# Patient Record
Sex: MALE | Race: WHITE | Employment: FULL TIME | URBAN - METROPOLITAN AREA
[De-identification: names, ages, dates, MRNs, and addresses within clinical notes are randomized per-mention and may not be internally consistent; named-entity substitution may affect disease eponyms.]

---

## 2017-01-12 ENCOUNTER — HOSPITAL ENCOUNTER (INPATIENT)
Age: 47
LOS: 5 days | Discharge: HOME | DRG: 387 | End: 2017-01-17
Attending: EMERGENCY MEDICINE | Admitting: INTERNAL MEDICINE
Payer: COMMERCIAL

## 2017-01-12 ENCOUNTER — APPOINTMENT (OUTPATIENT)
Dept: CT IMAGING | Age: 47
DRG: 387 | End: 2017-01-12
Attending: EMERGENCY MEDICINE
Payer: COMMERCIAL

## 2017-01-12 DIAGNOSIS — K51.911 ULCERATIVE COLITIS WITH RECTAL BLEEDING, UNSPECIFIED LOCATION (HCC): Primary | ICD-10-CM

## 2017-01-12 PROBLEM — K62.5 RECTAL BLEEDING: Status: ACTIVE | Noted: 2017-01-12

## 2017-01-12 PROBLEM — K51.90 COLITIS, ULCERATIVE CHRONIC (HCC): Status: ACTIVE | Noted: 2017-01-12

## 2017-01-12 PROBLEM — K51.90 ULCERATIVE COLITIS, CHRONIC (HCC): Status: ACTIVE | Noted: 2017-01-12

## 2017-01-12 PROBLEM — R50.9 FEVER: Status: ACTIVE | Noted: 2017-01-12

## 2017-01-12 LAB
ALBUMIN SERPL BCP-MCNC: 2.9 G/DL (ref 3.5–5)
ALBUMIN/GLOB SERPL: 0.6 {RATIO} (ref 1.2–3.5)
ALP SERPL-CCNC: 81 U/L (ref 50–136)
ALT SERPL-CCNC: 22 U/L (ref 12–65)
ANION GAP BLD CALC-SCNC: 11 MMOL/L (ref 7–16)
AST SERPL W P-5'-P-CCNC: 79 U/L (ref 15–37)
BILIRUB SERPL-MCNC: 0.4 MG/DL (ref 0.2–1.1)
BUN SERPL-MCNC: 10 MG/DL (ref 6–23)
CALCIUM SERPL-MCNC: 8.6 MG/DL (ref 8.3–10.4)
CHLORIDE SERPL-SCNC: 101 MMOL/L (ref 98–107)
CK SERPL-CCNC: 16 U/L (ref 21–215)
CO2 SERPL-SCNC: 23 MMOL/L (ref 21–32)
CREAT SERPL-MCNC: 1.06 MG/DL (ref 0.8–1.5)
DIFFERENTIAL METHOD BLD: ABNORMAL
ERYTHROCYTE [DISTWIDTH] IN BLOOD BY AUTOMATED COUNT: 19 % (ref 11.9–14.6)
FLUAV AG NPH QL IA: NEGATIVE
FLUBV AG NPH QL IA: NEGATIVE
GLOBULIN SER CALC-MCNC: 5 G/DL (ref 2.3–3.5)
GLUCOSE SERPL-MCNC: 99 MG/DL (ref 65–100)
HCT VFR BLD AUTO: 37 % (ref 41.1–50.3)
HGB BLD-MCNC: 11.2 G/DL (ref 13.6–17.2)
LACTATE BLD-SCNC: 1.3 MMOL/L (ref 0.5–1.9)
LIPASE SERPL-CCNC: 60 U/L (ref 73–393)
LYMPHOCYTES # BLD: 2.5 K/UL (ref 0.5–4.6)
LYMPHOCYTES NFR BLD MANUAL: 14 % (ref 16–44)
MCH RBC QN AUTO: 25.6 PG (ref 26.1–32.9)
MCHC RBC AUTO-ENTMCNC: 30.3 G/DL (ref 31.4–35)
MCV RBC AUTO: 84.5 FL (ref 79.6–97.8)
MONOCYTES # BLD: 1.1 K/UL (ref 0.1–1.3)
MONOCYTES NFR BLD MANUAL: 6 % (ref 3–9)
NEUTS BAND NFR BLD MANUAL: 8 % (ref 0–10)
NEUTS SEG # BLD: 14.3 K/UL (ref 1.7–8.2)
NEUTS SEG NFR BLD MANUAL: 72 % (ref 47–75)
PLATELET # BLD AUTO: 386 K/UL (ref 150–450)
PLATELET COMMENTS,PCOM: ADEQUATE
PMV BLD AUTO: 9.5 FL (ref 10.8–14.1)
POTASSIUM SERPL-SCNC: 5 MMOL/L (ref 3.5–5.1)
PROCALCITONIN SERPL-MCNC: 0.8 NG/ML
PROT SERPL-MCNC: 7.9 G/DL (ref 6.3–8.2)
RBC # BLD AUTO: 4.38 M/UL (ref 4.23–5.67)
RBC MORPH BLD: ABNORMAL
SODIUM SERPL-SCNC: 135 MMOL/L (ref 136–145)
WBC # BLD AUTO: 17.9 K/UL (ref 4.3–11.1)
WBC MORPH BLD: ABNORMAL

## 2017-01-12 PROCEDURE — 84145 PROCALCITONIN (PCT): CPT | Performed by: EMERGENCY MEDICINE

## 2017-01-12 PROCEDURE — 83605 ASSAY OF LACTIC ACID: CPT

## 2017-01-12 PROCEDURE — 83690 ASSAY OF LIPASE: CPT | Performed by: EMERGENCY MEDICINE

## 2017-01-12 PROCEDURE — 96375 TX/PRO/DX INJ NEW DRUG ADDON: CPT | Performed by: EMERGENCY MEDICINE

## 2017-01-12 PROCEDURE — 87804 INFLUENZA ASSAY W/OPTIC: CPT | Performed by: EMERGENCY MEDICINE

## 2017-01-12 PROCEDURE — 65270000029 HC RM PRIVATE

## 2017-01-12 PROCEDURE — 74011250637 HC RX REV CODE- 250/637: Performed by: INTERNAL MEDICINE

## 2017-01-12 PROCEDURE — 74011000258 HC RX REV CODE- 258: Performed by: EMERGENCY MEDICINE

## 2017-01-12 PROCEDURE — 85025 COMPLETE CBC W/AUTO DIFF WBC: CPT | Performed by: EMERGENCY MEDICINE

## 2017-01-12 PROCEDURE — 87493 C DIFF AMPLIFIED PROBE: CPT | Performed by: INTERNAL MEDICINE

## 2017-01-12 PROCEDURE — 74011636320 HC RX REV CODE- 636/320: Performed by: EMERGENCY MEDICINE

## 2017-01-12 PROCEDURE — 74011250636 HC RX REV CODE- 250/636: Performed by: INTERNAL MEDICINE

## 2017-01-12 PROCEDURE — 96361 HYDRATE IV INFUSION ADD-ON: CPT | Performed by: EMERGENCY MEDICINE

## 2017-01-12 PROCEDURE — 74011000250 HC RX REV CODE- 250: Performed by: INTERNAL MEDICINE

## 2017-01-12 PROCEDURE — 74011250636 HC RX REV CODE- 250/636: Performed by: EMERGENCY MEDICINE

## 2017-01-12 PROCEDURE — 96374 THER/PROPH/DIAG INJ IV PUSH: CPT | Performed by: EMERGENCY MEDICINE

## 2017-01-12 PROCEDURE — 80053 COMPREHEN METABOLIC PANEL: CPT | Performed by: EMERGENCY MEDICINE

## 2017-01-12 PROCEDURE — 99285 EMERGENCY DEPT VISIT HI MDM: CPT | Performed by: EMERGENCY MEDICINE

## 2017-01-12 PROCEDURE — 89055 LEUKOCYTE ASSESSMENT FECAL: CPT | Performed by: INTERNAL MEDICINE

## 2017-01-12 PROCEDURE — 74177 CT ABD & PELVIS W/CONTRAST: CPT

## 2017-01-12 PROCEDURE — 81003 URINALYSIS AUTO W/O SCOPE: CPT | Performed by: EMERGENCY MEDICINE

## 2017-01-12 PROCEDURE — 82550 ASSAY OF CK (CPK): CPT | Performed by: EMERGENCY MEDICINE

## 2017-01-12 RX ORDER — MESALAMINE 1.2 G/1
2.4 TABLET, DELAYED RELEASE ORAL
COMMUNITY

## 2017-01-12 RX ORDER — MESALAMINE 1.2 G/1
2400 TABLET, DELAYED RELEASE ORAL 2 TIMES DAILY WITH MEALS
Status: DISCONTINUED | OUTPATIENT
Start: 2017-01-13 | End: 2017-01-17 | Stop reason: HOSPADM

## 2017-01-12 RX ORDER — SODIUM CHLORIDE 0.9 % (FLUSH) 0.9 %
5-10 SYRINGE (ML) INJECTION EVERY 8 HOURS
Status: DISCONTINUED | OUTPATIENT
Start: 2017-01-12 | End: 2017-01-17 | Stop reason: HOSPADM

## 2017-01-12 RX ORDER — ASPIRIN 81 MG/1
81 TABLET ORAL DAILY
Status: ON HOLD | COMMUNITY
End: 2017-01-18 | Stop reason: CLARIF

## 2017-01-12 RX ORDER — HYDROMORPHONE HYDROCHLORIDE 1 MG/ML
1 INJECTION, SOLUTION INTRAMUSCULAR; INTRAVENOUS; SUBCUTANEOUS
Status: COMPLETED | OUTPATIENT
Start: 2017-01-12 | End: 2017-01-12

## 2017-01-12 RX ORDER — METRONIDAZOLE 500 MG/100ML
500 INJECTION, SOLUTION INTRAVENOUS EVERY 8 HOURS
Status: DISCONTINUED | OUTPATIENT
Start: 2017-01-12 | End: 2017-01-13

## 2017-01-12 RX ORDER — LANOLIN ALCOHOL/MO/W.PET/CERES
CREAM (GRAM) TOPICAL DAILY
Status: ON HOLD | COMMUNITY
End: 2017-01-18 | Stop reason: CLARIF

## 2017-01-12 RX ORDER — NALOXONE HYDROCHLORIDE 0.4 MG/ML
0.4 INJECTION, SOLUTION INTRAMUSCULAR; INTRAVENOUS; SUBCUTANEOUS AS NEEDED
Status: DISCONTINUED | OUTPATIENT
Start: 2017-01-12 | End: 2017-01-17 | Stop reason: HOSPADM

## 2017-01-12 RX ORDER — PREDNISONE 20 MG/1
40 TABLET ORAL
Status: ON HOLD | COMMUNITY
End: 2017-01-18 | Stop reason: CLARIF

## 2017-01-12 RX ORDER — ONDANSETRON 2 MG/ML
4 INJECTION INTRAMUSCULAR; INTRAVENOUS
Status: COMPLETED | OUTPATIENT
Start: 2017-01-12 | End: 2017-01-12

## 2017-01-12 RX ORDER — SODIUM CHLORIDE 0.9 % (FLUSH) 0.9 %
5-10 SYRINGE (ML) INJECTION AS NEEDED
Status: DISCONTINUED | OUTPATIENT
Start: 2017-01-12 | End: 2017-01-17 | Stop reason: HOSPADM

## 2017-01-12 RX ORDER — HYDROCODONE BITARTRATE AND ACETAMINOPHEN 5; 325 MG/1; MG/1
1 TABLET ORAL
COMMUNITY

## 2017-01-12 RX ORDER — ONDANSETRON 2 MG/ML
4 INJECTION INTRAMUSCULAR; INTRAVENOUS
Status: DISCONTINUED | OUTPATIENT
Start: 2017-01-12 | End: 2017-01-17 | Stop reason: HOSPADM

## 2017-01-12 RX ORDER — SODIUM CHLORIDE 0.9 % (FLUSH) 0.9 %
10 SYRINGE (ML) INJECTION
Status: COMPLETED | OUTPATIENT
Start: 2017-01-12 | End: 2017-01-12

## 2017-01-12 RX ORDER — HYDROMORPHONE HYDROCHLORIDE 1 MG/ML
1 INJECTION, SOLUTION INTRAMUSCULAR; INTRAVENOUS; SUBCUTANEOUS
Status: DISCONTINUED | OUTPATIENT
Start: 2017-01-12 | End: 2017-01-17 | Stop reason: HOSPADM

## 2017-01-12 RX ORDER — SODIUM CHLORIDE 9 MG/ML
50 INJECTION, SOLUTION INTRAVENOUS CONTINUOUS
Status: DISCONTINUED | OUTPATIENT
Start: 2017-01-12 | End: 2017-01-17 | Stop reason: HOSPADM

## 2017-01-12 RX ORDER — PROMETHAZINE HYDROCHLORIDE 25 MG/1
25 TABLET ORAL
Status: DISCONTINUED | OUTPATIENT
Start: 2017-01-12 | End: 2017-01-17 | Stop reason: HOSPADM

## 2017-01-12 RX ORDER — HYDROCODONE BITARTRATE AND ACETAMINOPHEN 7.5; 325 MG/1; MG/1
1 TABLET ORAL
Status: DISCONTINUED | OUTPATIENT
Start: 2017-01-12 | End: 2017-01-17 | Stop reason: HOSPADM

## 2017-01-12 RX ORDER — DICYCLOMINE HYDROCHLORIDE 20 MG/1
20 TABLET ORAL
Status: DISCONTINUED | OUTPATIENT
Start: 2017-01-12 | End: 2017-01-17 | Stop reason: HOSPADM

## 2017-01-12 RX ORDER — ZOLPIDEM TARTRATE 5 MG/1
5 TABLET ORAL
Status: DISCONTINUED | OUTPATIENT
Start: 2017-01-12 | End: 2017-01-17 | Stop reason: HOSPADM

## 2017-01-12 RX ORDER — FOLIC ACID 1 MG/1
1 TABLET ORAL DAILY
COMMUNITY

## 2017-01-12 RX ORDER — ACETAMINOPHEN 325 MG/1
650 TABLET ORAL
Status: DISCONTINUED | OUTPATIENT
Start: 2017-01-12 | End: 2017-01-17 | Stop reason: HOSPADM

## 2017-01-12 RX ADMIN — ACETAMINOPHEN 650 MG: 325 TABLET, FILM COATED ORAL at 23:40

## 2017-01-12 RX ADMIN — METRONIDAZOLE 500 MG: 500 INJECTION, SOLUTION INTRAVENOUS at 22:00

## 2017-01-12 RX ADMIN — Medication 10 ML: at 16:49

## 2017-01-12 RX ADMIN — METHYLPREDNISOLONE SODIUM SUCCINATE 40 MG: 40 INJECTION, POWDER, FOR SOLUTION INTRAMUSCULAR; INTRAVENOUS at 22:00

## 2017-01-12 RX ADMIN — SODIUM CHLORIDE 100 ML: 900 INJECTION, SOLUTION INTRAVENOUS at 16:50

## 2017-01-12 RX ADMIN — HYDROMORPHONE HYDROCHLORIDE 1 MG: 1 INJECTION, SOLUTION INTRAMUSCULAR; INTRAVENOUS; SUBCUTANEOUS at 15:08

## 2017-01-12 RX ADMIN — SODIUM CHLORIDE 1000 ML: 900 INJECTION, SOLUTION INTRAVENOUS at 15:08

## 2017-01-12 RX ADMIN — IOPAMIDOL 100 ML: 755 INJECTION, SOLUTION INTRAVENOUS at 16:50

## 2017-01-12 RX ADMIN — ONDANSETRON 4 MG: 2 INJECTION, SOLUTION INTRAMUSCULAR; INTRAVENOUS at 15:08

## 2017-01-12 RX ADMIN — Medication 10 ML: at 22:02

## 2017-01-12 RX ADMIN — SODIUM CHLORIDE 100 ML/HR: 900 INJECTION, SOLUTION INTRAVENOUS at 19:46

## 2017-01-12 RX ADMIN — HYDROCODONE BITARTRATE AND ACETAMINOPHEN 1 TABLET: 7.5; 325 TABLET ORAL at 22:01

## 2017-01-12 NOTE — LETTER
[address] [facility] EMERGENCY DEPT One [address] [address] [phone] Work/School Note Date: 1/12/2017 To Whom It May concern: Jazmín Villa was seen and treated today in the emergency room by the following provider(s): 
Attending Provider: Nahed Wang MD. Jazmín Villa was seen in the ER today 1/12/17 and his wife, Vinny Green. Avani Ovalle was seen in the ER with him today and may return to work on 1/13/17. Sincerely, Geovany Gillis RN

## 2017-01-12 NOTE — PROGRESS NOTES
TRANSFER - IN REPORT:    Verbal report received from [facility] LES on [employer]  being received from ER for routine progression of care      Report consisted of patients Situation, Background, Assessment and   Recommendations(SBAR). Information from the following report(s) SBAR and ED Summary was reviewed with the receiving nurse. Opportunity for questions and clarification was provided. Assessment completed upon patients arrival to unit and care assumed.

## 2017-01-12 NOTE — IP AVS SNAPSHOT
Current Discharge Medication List  
  
Take these medications at their scheduled times Dose & Instructions Dispensing Information Comments Morning Noon Evening Bedtime  
 aspirin delayed-release 81 mg tablet Dose:  81 mg Take 81 mg by mouth daily. Refills:  0  
     
   
   
   
  
 folic acid 1 mg tablet Commonly known as:  [employer] Your next dose is:  Tomorrow Dose:  1 mg Take 1 mg by mouth daily. Refills:  0  
     
   
   
   
  
 hydrocortisone 25 mg Supp Commonly known as:  ANUSOL-HC Your next dose is: Today Dose:  25 mg Insert 1 Suppository into rectum every twelve (12) hours. Quantity:  14 Suppository Refills:  0  
     
   
   
 9 PM  
  
 Iron 325 mg (65 mg iron) tablet Generic drug:  ferrous sulfate Take  by mouth daily. Refills:  0 LIALDA 1.2 gram delayed release tablet Generic drug:  mesalamine Your next dose is: Today Dose:  2.4 g Take 2.4 g by mouth ACB/HS. Refills:  0  
     
   
 5 PM  
   
  
 metroNIDAZOLE 500 mg tablet Commonly known as:  FLAGYL Your next dose is: Today Dose:  500 mg Take 1 Tab by mouth every eight (8) hours for 9 days. Indications: Clostridium difficile infection Quantity:  27 Tab Refills:  0  
     
   
   
 10 PM  
  
 * predniSONE 20 mg tablet Commonly known as:  Juan Dowd Dose:  40 mg Take 40 mg by mouth daily (with breakfast). Refills:  0  
     
   
   
   
  
 * Notice: This list has 1 medication(s) that are the same as other medications prescribed for you. Read the directions carefully, and ask your doctor or other care provider to review them with you. Take these medications as needed Dose & Instructions Dispensing Information Comments Morning Noon Evening Bedtime NORCO 5-325 mg per tablet Generic drug:  HYDROcodone-acetaminophen Dose:  1 Tab Take 1 Tab by mouth every six (6) hours as needed for Pain. Refills:  0  
     
   
   
   
  
 promethazine 25 mg tablet Commonly known as:  PHENERGAN Dose:  25 mg Take 1 Tab by mouth every eight (8) hours as needed. Quantity:  15 Tab Refills:  0 Take these medications as directed Dose & Instructions Dispensing Information Comments Morning Noon Evening Bedtime * predniSONE 10 mg tablet Commonly known as:  Nanine Knife Your next dose is:  Tomorrow Take 4 tabs PO daily for 7 days, then 3 tabs PO for 7 days, then 2 tabs PO for 7 days, then 1 tab PO for 7 days. Then stop. Indications: ULCERATIVE COLITIS Quantity:  70 Tab Refills:  0 REMICADE IV  
   
 by IntraVENous route. Refills:  0  
     
   
   
   
  
 * Notice: This list has 1 medication(s) that are the same as other medications prescribed for you. Read the directions carefully, and ask your doctor or other care provider to review them with you. Where to Get Your Medications Information about where to get these medications is not yet available ! Ask your nurse or doctor about these medications  
  hydrocortisone 25 mg Supp  
 metroNIDAZOLE 500 mg tablet  
 predniSONE 10 mg tablet

## 2017-01-12 NOTE — ED TRIAGE NOTES
Reports fever for a couple of days. Reports fever was 104 at home this AM.  States not eating well. Took tylenol this AM for fever.

## 2017-01-12 NOTE — ED PROVIDER NOTES
HPI Comments: Presents with complaints of fever the past 2 nights. Reports increased abdominal pain and diarrhea/hematochezia. He normally has 2-3 bowel movements per day now having 10. Patient is due for his third Remicade shot in 1 week. He is currently on 40 mg of prednisone. Patient is a 40 y.o. male presenting with fever. The history is provided by the patient. Fever    This is a new problem. The current episode started 2 days ago. The problem occurs constantly. The problem has been gradually worsening. The maximum temperature noted was 103 - 104 F. Associated symptoms include diarrhea. Pertinent negatives include no vomiting, no cough, no shortness of breath and no rash. He has tried acetaminophen and prescription drug for the symptoms. Past Medical History:   Diagnosis Date    Ulcerative colitis [facility])        Past Surgical History:   Procedure Laterality Date    Flexible sigmoidoscopy N/A 12/5/2016     SIGMOIDOSCOPY FLEXIBLE performed by Ghazala Mosher MD at [facility] ENDOSCOPY         History reviewed. No pertinent family history. Social History     Social History    Marital status:      Spouse name: N/A    Number of children: N/A    Years of education: N/A     Occupational History    Not on file. Social History Main Topics    Smoking status: Never Smoker    Smokeless tobacco: Not on file    Alcohol use Yes    Drug use: No    Sexual activity: Not on file     Other Topics Concern    Not on file     Social History Narrative         ALLERGIES: Ibuprofen and Sulfa (sulfonamide antibiotics)    Review of Systems   Constitutional: Positive for chills and fever. Respiratory: Negative for cough and shortness of breath. Gastrointestinal: Positive for diarrhea. Negative for vomiting. Skin: Negative for rash and wound. All other systems reviewed and are negative.       Vitals:    01/12/17 1037 01/12/17 1239   BP: 114/72    Pulse: 97    Resp: 18    Temp: 99.5 °F (37.5 °C) 99 °F (37.2 °C)   SpO2: 97%    Weight: 95 kg (209 lb 7 oz)    Height: 5' 9\" (1.753 m)             Physical Exam   Constitutional: He is oriented to person, place, and time. He appears well-developed and well-nourished. No distress. HENT:   Head: Normocephalic and atraumatic. Neck: Normal range of motion. Neck supple. Cardiovascular: Normal rate. Pulmonary/Chest: Effort normal and breath sounds normal. No respiratory distress. He has no wheezes. He has no rales. Abdominal: Soft. He exhibits no distension. There is tenderness (minor suprapubic). There is no rebound and no guarding. Musculoskeletal: Normal range of motion. He exhibits no edema. Neurological: He is alert and oriented to person, place, and time. No cranial nerve deficit. Skin: Skin is warm and dry. He is not diaphoretic. Psychiatric: He has a normal mood and affect. His behavior is normal.   Nursing note and vitals reviewed. MDM  Number of Diagnoses or Management Options  Ulcerative colitis with rectal bleeding, unspecified location [facility]):   Diagnosis management comments: Patient with increased abdominal pain, fever, already on prednisone and Remicade discussed with Dr. Rachell Olivo and he will need to be admitted.        Amount and/or Complexity of Data Reviewed  Clinical lab tests: ordered and reviewed  Review and summarize past medical records: yes (Recently admitted for the same thing had a CT scan that showed no abscess)  Discuss the patient with other providers: yes    Risk of Complications, Morbidity, and/or Mortality  Presenting problems: high  Diagnostic procedures: moderate  Management options: high    Patient Progress  Patient progress: improved    ED Course       Procedures

## 2017-01-12 NOTE — H&P
[facility] Consult Note  [facility] Admission H&P       Primary GI Physician: Alfredito Acevedo MD    Referring Physician:  Dr Henrietta Ding - ER    Consult Date:  1/12/2017    Admit Date:  1/12/2017    Chief Complaint:  UC patient known to you with fever    Subjective:     History of Present Illness:  Patient is a 40 y.o. male with PMH of Chronic Ulcerative Colitis > 20 years well known to me, who is seen in consultation at the request of Dr. Henrietta Ding for disposition / admission for fever and flare-up of Colitis. He was last seen in our office by Dr. Chucho Howard on 1/5/17 after hospitalization at [address] [place] [address] from 12/1/16 to 12/13/16 for UC flare-up at which time he was started on Remicade therapy with first dose administered on 12/9 with improvement in his symptoms and was discharged on tapering Prednisone. He received the second dose of Remicade on 12/22 and is scheduled for the 3rd infusion on January 19. Patient reports initial improvement in his diarrhea and other symptoms with resolution of hematochezia, abdominal pain and describes having 1 to 2 formed stools per day. However his symptoms relapsed over the last week with increasing abdominal pain, diarrhea, hematochezia and low-grade fevers. He also describes decreased appetite and nausea with some weight loss. He had contacted our office and was advised to increase prednisone to 40 mg per day which he has taken for the last 3 days. He woke up this morning with fever of 104. He took some Tylenol and is currently afebrile. Evaluation in the emergency room has included chest x-ray and flu swab which have been negative. A CT scan of the abdomen and pelvis shows inflammation of the distal colon consistent with ulcerative colitis. White count was elevated to 17.9 with 70 segs and 8 bands. Hemoglobin is 11.2. Electrolytes were normal, BUN was 10, creatinine was 1.06. Liver profile revealed an elevated AST of 79 and a low albumin of 2.9.  Other liver enzymes were normal. Lipase was low at 60. Procalcitonin was elevated at 0.8. Lactic acid was 1.3. Patient localizes his abdominal pain to the suprapubic and both lower quadrants, left greater than right. He denies any vomiting, hematemesis or coffee-ground emesis. He reports mild reflux symptoms without dysphagia or odynophagia. PMH:  Past Medical History   Diagnosis Date    Ulcerative colitis ([address]      Prior treatment with 5-ASA, Steroids, Budesonide. GERD controlled with OTC Omeprazole       PSH:  Past Surgical History   Procedure Laterality Date    Flexible sigmoidoscopy N/A 12/5/2016     SIGMOIDOSCOPY FLEXIBLE performed by Davis Blanchard MD at [facility] ENDOSCOPY     Colon bx showed Chronic active Colitis. Bx were negative for CMV. EGD - Gastritis negative for h pylori  Candida esophagitis - treated with diflucan. Additional prior Colonoscopies with predominantly left sided disease. Allergies: Allergies   Allergen Reactions    Ibuprofen Other (comments)     Colitis      Sulfa (Sulfonamide Antibiotics) Rash       Home Medications:  Prior to Admission medications    Medication Sig Start Date End Date Taking? Authorizing Provider   dicyclomine (BENTYL) 10 mg capsule Take 1 Cap by mouth four (4) times daily. 12/13/16   ROSALINDA Perkins   pantoprazole (PROTONIX) 40 mg tablet Take 1 Tab by mouth Daily (before breakfast). 12/13/16   ROSALINDA Perkins   promethazine (PHENERGAN) 25 mg tablet Take 1 Tab by mouth every eight (8) hours as needed.  12/13/16   ROSALINDA Perkins       Hospital Medications:  Current Facility-Administered Medications   Medication Dose Route Frequency    sodium chloride (NS) flush 5-10 mL  5-10 mL IntraVENous Q8H    sodium chloride (NS) flush 5-10 mL  5-10 mL IntraVENous PRN    dicyclomine (BENTYL) tablet 20 mg  20 mg Oral Q6H PRN    ondansetron (ZOFRAN) injection 4 mg  4 mg IntraVENous Q4H PRN    acetaminophen (TYLENOL) tablet 650 mg  650 mg Oral Q4H PRN  HYDROcodone-acetaminophen (NORCO) 7.5-325 mg per tablet 1 Tab  1 Tab Oral Q4H PRN    HYDROmorphone (PF) (DILAUDID) injection 1 mg  1 mg IntraVENous Q4H PRN    naloxone (NARCAN) injection 0.4 mg  0.4 mg IntraVENous PRN    zolpidem (AMBIEN) tablet 5 mg  5 mg Oral QHS PRN    0.9% sodium chloride infusion  100 mL/hr IntraVENous CONTINUOUS    methylPREDNISolone (PF) (SOLU-MEDROL) injection 40 mg  40 mg IntraVENous Q8H    [START ON 1/13/2017] pantoprazole (PROTONIX) 40 mg in sodium chloride 0.9 % 10 mL injection  40 mg IntraVENous DAILY    promethazine (PHENERGAN) tablet 25 mg  25 mg Oral Q6H PRN    metroNIDAZOLE (FLAGYL) IVPB premix 500 mg  500 mg IntraVENous Q8H       Social History:  Social History   Substance Use Topics    Smoking status: Never Smoker    Smokeless tobacco: Not on file    Alcohol use Yes     Pt denies any history of drug use, blood transfusions, or tattoos. Family History:  History reviewed. No pertinent family history. Review of Systems:  A detailed 10 system ROS is obtained, with pertinent positives as listed above. All others are negative. Objective:     Physical Exam:  Vitals:  Visit Vitals    /78    Pulse 80    Temp 99 °F (37.2 °C)    Resp 18    Ht 5' 9\" (1.753 m)    Wt 95 kg (209 lb 7 oz)    SpO2 97%    BMI 30.93 kg/m2     Gen:  Pt is alert, cooperative, no acute distress  Skin:  Extremities and face reveal no rashes. HEENT: Sclerae anicteric. Conjunctiva are pink. The neck is supple. Cardiovascular: Regular rate and rhythm. No murmurs, gallops, or rubs. Respiratory:  Comfortable breathing with no accessory muscle use. Clear breath sounds anteriorly with no wheezes, rales, or rhonchi. GI:  Abdomen nondistended, soft, and moderately tender across lower abdomen. Normal active bowel sounds. No enlargement of the liver or spleen. No masses palpable. Rectal:  Deferred  Musculoskeletal:  No pitting edema of the lower legs.     Neurological:  Gross memory appears intact. Patient is alert and oriented. Psychiatric:  Mood appears appropriate with judgement intact. Lymphatic:  No cervical or supraclavicular adenopathy. Laboratory:    Recent Labs      01/12/17   1045   WBC  17.9*   HGB  11.2*   HCT  37.0*   PLT  386   MCV  84.5   NA  135*   K  5.0   CL  101   CO2  23   BUN  10   CREA  1.06   CA  8.6   GLU  99   AP  81   SGOT  79*   ALT  22   TBILI  0.4   ALB  2.9*   TP  7.9   LPSE  60*          Assessment:     Active Problems:    Diarrhea (12/1/2016)      Rectal bleeding (1/12/2017)      Fever (1/12/2017)      Ulcerative colitis, chronic ([address] (1/12/2017)    Patient is a 40 y.o. male with PMH of Chronic Ulcerative Colitis > 20 years well known to me, who presents to ER for fever to 104 F and symptoms and CT consistent with a flare-up of Colitis. He was started on Remicade therapy during last hospitalization on 12/9 and received the second dose on 12/22. He is due for 3rd dose on 1/19/17. He has failed outpatient treatment with Prednisone with dose recently increased to 40 mg after initial taper to 10 mg from a discharge dose of 40 mg daily. Plan:     Admit for iv steroids, fluids and symptomatic therapy. Monitor Labs and check inflammatory markers. Rule out other sources of fever. Recheck stools for c diff. Will start Flagyl IV empirically. Consider earlier than planned administration of Remicade as inpatient, possibly at higher dose of 8-10 mg/kg. Options for switching to an alternate anti-TNF or to the more recently approved Entyvio was discussed. TPMT enzyme study was obtained on last admission but is not in connect care. Consider repeat TPMT asssay if not obtained and addition of Imuran or 6 MP for dual therapy.       Ayleen Klein MD

## 2017-01-12 NOTE — PROGRESS NOTES
Pt arrived to room 811 from ER. Pt alert oriented times 3 at this times. Pt ambulating to RR without problems. Pt aware of  Need for urine specimen and stool specimen. Specimen cup at bedside. Pt denies pain at this time. Pt made aware of need to clear liquid diet. SCDs in place on pt. Pt has no skin breakdown noted at this time. Pt skin assessed with [facility]. Pt oriented times 3 at this time. Pt encouraged to call for assistance if needed call light in reach, door open will monitor.

## 2017-01-12 NOTE — Clinical Note
Status[de-identified] Inpatient [101] Type of Bed: Medical [8] Inpatient Hospitalization Certified Necessary for the Following Reasons: 1. Patient Failed outpatient treatment (further clarification in H&P documentation) Admitting Diagnosis: [facility], ulcerative chronic ([facility] [704283] Admitting Diagnosis: Fever [9200808] Admitting Diagnosis: Diarrhea [787.91. ICD-9-CM] Admitting Diagnosis: Rectal bleeding [098121] Admitting Physician: Manuel Leo Attending Physician: Manuel Leo Estimated Length of Stay: 3-4 Midnights Discharge Plan[de-identified] Home with Office Follow-up

## 2017-01-13 LAB
ALBUMIN SERPL BCP-MCNC: 2.8 G/DL (ref 3.5–5)
ALBUMIN/GLOB SERPL: 0.6 {RATIO} (ref 1.2–3.5)
ALP SERPL-CCNC: 82 U/L (ref 50–136)
ALT SERPL-CCNC: 16 U/L (ref 12–65)
ANION GAP BLD CALC-SCNC: 10 MMOL/L (ref 7–16)
AST SERPL W P-5'-P-CCNC: 11 U/L (ref 15–37)
BACTERIA SPEC CULT: NORMAL
BACTERIA SPEC CULT: NORMAL
BACTERIA SPEC CULT: POSITIVE
BASOPHILS # BLD AUTO: 0 K/UL (ref 0–0.2)
BASOPHILS # BLD: 0 % (ref 0–2)
BILIRUB SERPL-MCNC: 0.2 MG/DL (ref 0.2–1.1)
BUN SERPL-MCNC: 10 MG/DL (ref 6–23)
CALCIUM SERPL-MCNC: 8.8 MG/DL (ref 8.3–10.4)
CHLORIDE SERPL-SCNC: 103 MMOL/L (ref 98–107)
CO2 SERPL-SCNC: 26 MMOL/L (ref 21–32)
CREAT SERPL-MCNC: 0.89 MG/DL (ref 0.8–1.5)
CRP SERPL-MCNC: 36.1 MG/DL (ref 0–0.9)
DIFFERENTIAL METHOD BLD: ABNORMAL
EOSINOPHIL # BLD: 0 K/UL (ref 0–0.8)
EOSINOPHIL NFR BLD: 0 % (ref 0.5–7.8)
ERYTHROCYTE [DISTWIDTH] IN BLOOD BY AUTOMATED COUNT: 18.9 % (ref 11.9–14.6)
ERYTHROCYTE [SEDIMENTATION RATE] IN BLOOD: 86 MM/HR (ref 0–20)
GLOBULIN SER CALC-MCNC: 4.7 G/DL (ref 2.3–3.5)
GLUCOSE SERPL-MCNC: 109 MG/DL (ref 65–100)
HCT VFR BLD AUTO: 36.5 % (ref 41.1–50.3)
HGB BLD-MCNC: 10.9 G/DL (ref 13.6–17.2)
IMM GRANULOCYTES # BLD: 0 K/UL (ref 0–0.5)
IMM GRANULOCYTES NFR BLD AUTO: 0.3 % (ref 0–5)
LYMPHOCYTES # BLD AUTO: 7 % (ref 13–44)
LYMPHOCYTES # BLD: 1 K/UL (ref 0.5–4.6)
MCH RBC QN AUTO: 25.3 PG (ref 26.1–32.9)
MCHC RBC AUTO-ENTMCNC: 29.9 G/DL (ref 31.4–35)
MCV RBC AUTO: 84.7 FL (ref 79.6–97.8)
MONOCYTES # BLD: 0.7 K/UL (ref 0.1–1.3)
MONOCYTES NFR BLD AUTO: 5 % (ref 4–12)
NEUTS SEG # BLD: 12.6 K/UL (ref 1.7–8.2)
NEUTS SEG NFR BLD AUTO: 88 % (ref 43–78)
PLATELET # BLD AUTO: 391 K/UL (ref 150–450)
PMV BLD AUTO: 9.4 FL (ref 10.8–14.1)
POTASSIUM SERPL-SCNC: 3.8 MMOL/L (ref 3.5–5.1)
PROT SERPL-MCNC: 7.5 G/DL (ref 6.3–8.2)
RBC # BLD AUTO: 4.31 M/UL (ref 4.23–5.67)
SERVICE CMNT-IMP: NORMAL
SODIUM SERPL-SCNC: 139 MMOL/L (ref 136–145)
WBC # BLD AUTO: 14.3 K/UL (ref 4.3–11.1)
WBC #/AREA STL HPF: NORMAL /HPF (ref 0–4)

## 2017-01-13 PROCEDURE — 36415 COLL VENOUS BLD VENIPUNCTURE: CPT | Performed by: INTERNAL MEDICINE

## 2017-01-13 PROCEDURE — 74011250637 HC RX REV CODE- 250/637: Performed by: INTERNAL MEDICINE

## 2017-01-13 PROCEDURE — C9113 INJ PANTOPRAZOLE SODIUM, VIA: HCPCS | Performed by: INTERNAL MEDICINE

## 2017-01-13 PROCEDURE — 85652 RBC SED RATE AUTOMATED: CPT | Performed by: INTERNAL MEDICINE

## 2017-01-13 PROCEDURE — 86140 C-REACTIVE PROTEIN: CPT | Performed by: INTERNAL MEDICINE

## 2017-01-13 PROCEDURE — 80053 COMPREHEN METABOLIC PANEL: CPT | Performed by: INTERNAL MEDICINE

## 2017-01-13 PROCEDURE — 85025 COMPLETE CBC W/AUTO DIFF WBC: CPT | Performed by: INTERNAL MEDICINE

## 2017-01-13 PROCEDURE — 74011250636 HC RX REV CODE- 250/636: Performed by: INTERNAL MEDICINE

## 2017-01-13 PROCEDURE — 87086 URINE CULTURE/COLONY COUNT: CPT | Performed by: INTERNAL MEDICINE

## 2017-01-13 PROCEDURE — 74011000250 HC RX REV CODE- 250: Performed by: INTERNAL MEDICINE

## 2017-01-13 PROCEDURE — 65270000029 HC RM PRIVATE

## 2017-01-13 RX ORDER — METRONIDAZOLE 500 MG/1
500 TABLET ORAL EVERY 8 HOURS
Status: DISCONTINUED | OUTPATIENT
Start: 2017-01-13 | End: 2017-01-17 | Stop reason: HOSPADM

## 2017-01-13 RX ADMIN — Medication 10 ML: at 06:03

## 2017-01-13 RX ADMIN — METHYLPREDNISOLONE SODIUM SUCCINATE 40 MG: 40 INJECTION, POWDER, FOR SOLUTION INTRAMUSCULAR; INTRAVENOUS at 21:01

## 2017-01-13 RX ADMIN — MESALAMINE 2400 MG: 1.2 TABLET, DELAYED RELEASE ORAL at 20:43

## 2017-01-13 RX ADMIN — SODIUM CHLORIDE 40 MG: 9 INJECTION INTRAMUSCULAR; INTRAVENOUS; SUBCUTANEOUS at 08:27

## 2017-01-13 RX ADMIN — SODIUM CHLORIDE 100 ML/HR: 900 INJECTION, SOLUTION INTRAVENOUS at 05:41

## 2017-01-13 RX ADMIN — Medication 10 ML: at 21:02

## 2017-01-13 RX ADMIN — SODIUM CHLORIDE 100 ML/HR: 900 INJECTION, SOLUTION INTRAVENOUS at 16:06

## 2017-01-13 RX ADMIN — HYDROMORPHONE HYDROCHLORIDE 1 MG: 1 INJECTION, SOLUTION INTRAMUSCULAR; INTRAVENOUS; SUBCUTANEOUS at 20:44

## 2017-01-13 RX ADMIN — METRONIDAZOLE 500 MG: 500 TABLET ORAL at 14:01

## 2017-01-13 RX ADMIN — METRONIDAZOLE 500 MG: 500 TABLET ORAL at 06:00

## 2017-01-13 RX ADMIN — METHYLPREDNISOLONE SODIUM SUCCINATE 40 MG: 40 INJECTION, POWDER, FOR SOLUTION INTRAMUSCULAR; INTRAVENOUS at 06:00

## 2017-01-13 RX ADMIN — METRONIDAZOLE 500 MG: 500 TABLET ORAL at 21:01

## 2017-01-13 RX ADMIN — ZOLPIDEM TARTRATE 5 MG: 5 TABLET, FILM COATED ORAL at 00:16

## 2017-01-13 RX ADMIN — MESALAMINE 2400 MG: 1.2 TABLET, DELAYED RELEASE ORAL at 08:29

## 2017-01-13 RX ADMIN — METHYLPREDNISOLONE SODIUM SUCCINATE 40 MG: 40 INJECTION, POWDER, FOR SOLUTION INTRAMUSCULAR; INTRAVENOUS at 14:01

## 2017-01-13 NOTE — PROGRESS NOTES
Pt resting in bed and is alert and oriented x 4. He denies pain and is on room air. RR even and unlabored. Call light in reach and pt instructed to call for assistance if needed. Will monitor.

## 2017-01-13 NOTE — PROGRESS NOTES
Pt sitting up in bed. Pt alert oriented times 3 at this time. Pt denies pain or distress at this times. Pt reports 2 loose stools during the night. Pt encouraged to call for assistance if needed call light in reach, door open will monitor.

## 2017-01-13 NOTE — PROGRESS NOTES
Pt resting in bed. No distress noted a this time. Pt reports 5  Loose watery BM today. Pt encouraged to call for assistance if needed call light in reach, door open will monitor.

## 2017-01-13 NOTE — PROGRESS NOTES
Pt resting in bed. Pt with good po intake of full liquid diet. No distress noted at this time. Call light in reach, door open will monitor.

## 2017-01-13 NOTE — PROGRESS NOTES
GI DAILY PROGRESS NOTE    Admit Date:  1/12/2017    Today's Date:  1/13/2017    CC:  UC, C diff    Subjective:     Patient feels better. Lower abdominal pain better, none this afternoon. Reports 5 loose BM since this morning. Some bright red blood noted. Tolerates clears. C diff positive. Reports having been treated w/ Amoxicillin like abx in Nov 2016 for skin infection. Medications:   Current Facility-Administered Medications   Medication Dose Route Frequency    metroNIDAZOLE (FLAGYL) tablet 500 mg  500 mg Oral Q8H    sodium chloride (NS) flush 5-10 mL  5-10 mL IntraVENous Q8H    sodium chloride (NS) flush 5-10 mL  5-10 mL IntraVENous PRN    dicyclomine (BENTYL) tablet 20 mg  20 mg Oral Q6H PRN    ondansetron (ZOFRAN) injection 4 mg  4 mg IntraVENous Q4H PRN    acetaminophen (TYLENOL) tablet 650 mg  650 mg Oral Q4H PRN    HYDROcodone-acetaminophen (NORCO) 7.5-325 mg per tablet 1 Tab  1 Tab Oral Q4H PRN    HYDROmorphone (PF) (DILAUDID) injection 1 mg  1 mg IntraVENous Q4H PRN    naloxone (NARCAN) injection 0.4 mg  0.4 mg IntraVENous PRN    zolpidem (AMBIEN) tablet 5 mg  5 mg Oral QHS PRN    0.9% sodium chloride infusion  100 mL/hr IntraVENous CONTINUOUS    methylPREDNISolone (PF) (SOLU-MEDROL) injection 40 mg  40 mg IntraVENous Q8H    pantoprazole (PROTONIX) 40 mg in sodium chloride 0.9 % 10 mL injection  40 mg IntraVENous DAILY    promethazine (PHENERGAN) tablet 25 mg  25 mg Oral Q6H PRN    mesalamine (LIALDA) delayed release tablet 2,400 mg  2,400 mg Oral BID WITH MEALS    SE-Tan Plus MV Combo 1 Cap  1 Cap Oral DAILY       Review of Systems:  ROS was obtained, with pertinent positives as listed above. No chest pain or SOB.     Diet:  Clears    Objective:   Vitals:  Visit Vitals    /76    Pulse 78    Temp 98.9 °F (37.2 °C)    Resp 20    Ht 5' 9\" (1.753 m)    Wt 94.1 kg (207 lb 6.4 oz)    SpO2 98%    BMI 30.63 kg/m2     Intake/Output:  01/13 0701 - 01/13 1900  In: 480 [P.O.:480]  Out: -   01/11 1901 - 01/13 0700  In: 480 [P.O.:480]  Out: 100 [Urine:100]  Exam:  General appearance: alert, cooperative, no distress  Lungs: clear to auscultation bilaterally anteriorly  Heart: regular rate and rhythm  Abdomen: soft, NONTENDER. Bowel sounds normal. No masses, no organomegaly  Extremities: extremities normal, atraumatic, no cyanosis or edema  Neuro:  alert and oriented    Data Review (Labs):    Recent Labs      01/13/17   0550  01/12/17   1045   WBC  14.3*  17.9*   HGB  10.9*  11.2*   HCT  36.5*  37.0*   PLT  391  386   MCV  84.7  84.5   NA  139  135*   K  3.8  5.0   CL  103  101   CO2  26  23   BUN  10  10   CREA  0.89  1.06   CA  8.8  8.6   GLU  109*  99   AP  82  81   SGOT  11*  79*   ALT  16  22   TBILI  0.2  0.4   ALB  2.8*  2.9*   TP  7.5  7.9   LPSE   --   60*       Assessment:     Active Problems:  Diarrhea (12/1/2016)  Rectal bleeding (1/12/2017)  Colitis, ulcerative chronic ([facility]) (1/12/2017)  Fever (1/12/2017)  Ulcerative colitis, chronic ([facility] (1/12/2017)    Patient is a 40 y.o. male with PMHx of Chronic Ulcerative Colitis > 20 years well known to Dr. Juan Manuel Edge, who presented to ER for fever of 104 F, CT consistent with a flare-up of Colitis. Stools positive for C diff.      He was started on Remicade therapy during last hospitalization on 12/9 and received the second dose on 12/22. He is due for 3rd dose on 1/19/17.      He has failed outpatient treatment with Prednisone with dose recently increased to 40 mg after initial taper to 10 mg from a discharge dose of 40 mg daily. Plan:     Continue po Flagyl day #1, IV solumedrol, & IVF. Advance diet as tolerated. Options for switching to an alternate anti-TNF or to the more recently approved Entyvio was discussed. TPMT enzyme study was obtained on last admission but is not in connect care. Consider repeat TPMT asssay if not obtained and addition of Imuran or 6 MP for dual therapy.     Clarissa Councilman PA-C    Patient seen and examined. Agree with above. Discussed assessment and plans with patient. Continue po Flagyl TID x 14 days. Will possibly suzanne to reschedule planned Remicade for next Thursday. Fanny Hodgson MD

## 2017-01-13 NOTE — PROGRESS NOTES
Pt requests that his Lialda and iron be put on his mar. Dr. Alvira Felty notified and ordered via telephone verbal read back.

## 2017-01-13 NOTE — PROGRESS NOTES
Lab called a critical on pt being C Diff positive. Dr. Teo Augustin notified and MD ordered via telephone read back to change the flagyl IV to flagyl PO.

## 2017-01-14 LAB
ANION GAP BLD CALC-SCNC: 9 MMOL/L (ref 7–16)
BUN SERPL-MCNC: 7 MG/DL (ref 6–23)
CALCIUM SERPL-MCNC: 8.9 MG/DL (ref 8.3–10.4)
CHLORIDE SERPL-SCNC: 110 MMOL/L (ref 98–107)
CO2 SERPL-SCNC: 26 MMOL/L (ref 21–32)
CREAT SERPL-MCNC: 0.61 MG/DL (ref 0.8–1.5)
DIFFERENTIAL METHOD BLD: ABNORMAL
ERYTHROCYTE [DISTWIDTH] IN BLOOD BY AUTOMATED COUNT: 18.8 % (ref 11.9–14.6)
GLUCOSE BLD STRIP.AUTO-MCNC: 115 MG/DL (ref 65–100)
GLUCOSE BLD STRIP.AUTO-MCNC: 115 MG/DL (ref 65–100)
GLUCOSE BLD STRIP.AUTO-MCNC: 133 MG/DL (ref 65–100)
GLUCOSE SERPL-MCNC: 119 MG/DL (ref 65–100)
HCT VFR BLD AUTO: 33.2 % (ref 41.1–50.3)
HGB BLD-MCNC: 9.8 G/DL (ref 13.6–17.2)
LYMPHOCYTES # BLD: 0.9 K/UL (ref 0.5–4.6)
LYMPHOCYTES NFR BLD MANUAL: 5 % (ref 16–44)
MCH RBC QN AUTO: 24.6 PG (ref 26.1–32.9)
MCHC RBC AUTO-ENTMCNC: 29.5 G/DL (ref 31.4–35)
MCV RBC AUTO: 83.4 FL (ref 79.6–97.8)
MONOCYTES # BLD: 1.3 K/UL (ref 0.1–1.3)
MONOCYTES NFR BLD MANUAL: 7 % (ref 3–9)
NEUTS SEG # BLD: 15.8 K/UL (ref 1.7–8.2)
NEUTS SEG NFR BLD MANUAL: 88 % (ref 47–75)
PLATELET # BLD AUTO: 431 K/UL (ref 150–450)
PLATELET COMMENTS,PCOM: ABNORMAL
PMV BLD AUTO: 9.5 FL (ref 10.8–14.1)
POTASSIUM SERPL-SCNC: 3.6 MMOL/L (ref 3.5–5.1)
RBC # BLD AUTO: 3.98 M/UL (ref 4.23–5.67)
RBC MORPH BLD: ABNORMAL
SODIUM SERPL-SCNC: 145 MMOL/L (ref 136–145)
WBC # BLD AUTO: 18 K/UL (ref 4.3–11.1)

## 2017-01-14 PROCEDURE — 65270000029 HC RM PRIVATE

## 2017-01-14 PROCEDURE — 80048 BASIC METABOLIC PNL TOTAL CA: CPT | Performed by: PHYSICIAN ASSISTANT

## 2017-01-14 PROCEDURE — 74011000250 HC RX REV CODE- 250: Performed by: INTERNAL MEDICINE

## 2017-01-14 PROCEDURE — 74011250636 HC RX REV CODE- 250/636: Performed by: INTERNAL MEDICINE

## 2017-01-14 PROCEDURE — 36415 COLL VENOUS BLD VENIPUNCTURE: CPT | Performed by: PHYSICIAN ASSISTANT

## 2017-01-14 PROCEDURE — 74011250637 HC RX REV CODE- 250/637: Performed by: INTERNAL MEDICINE

## 2017-01-14 PROCEDURE — 82962 GLUCOSE BLOOD TEST: CPT

## 2017-01-14 PROCEDURE — C9113 INJ PANTOPRAZOLE SODIUM, VIA: HCPCS | Performed by: INTERNAL MEDICINE

## 2017-01-14 PROCEDURE — 81401 MOPATH PROCEDURE LEVEL 2: CPT | Performed by: PHYSICIAN ASSISTANT

## 2017-01-14 PROCEDURE — 85025 COMPLETE CBC W/AUTO DIFF WBC: CPT | Performed by: PHYSICIAN ASSISTANT

## 2017-01-14 RX ORDER — FOLIC ACID 1 MG/1
1 TABLET ORAL DAILY
Status: DISCONTINUED | OUTPATIENT
Start: 2017-01-14 | End: 2017-01-17 | Stop reason: HOSPADM

## 2017-01-14 RX ADMIN — Medication 10 ML: at 22:19

## 2017-01-14 RX ADMIN — DICYCLOMINE HYDROCHLORIDE 20 MG: 20 TABLET ORAL at 18:14

## 2017-01-14 RX ADMIN — SODIUM CHLORIDE 100 ML/HR: 900 INJECTION, SOLUTION INTRAVENOUS at 13:24

## 2017-01-14 RX ADMIN — METHYLPREDNISOLONE SODIUM SUCCINATE 40 MG: 40 INJECTION, POWDER, FOR SOLUTION INTRAMUSCULAR; INTRAVENOUS at 22:08

## 2017-01-14 RX ADMIN — SODIUM CHLORIDE 40 MG: 9 INJECTION INTRAMUSCULAR; INTRAVENOUS; SUBCUTANEOUS at 06:37

## 2017-01-14 RX ADMIN — HYDROMORPHONE HYDROCHLORIDE 1 MG: 1 INJECTION, SOLUTION INTRAMUSCULAR; INTRAVENOUS; SUBCUTANEOUS at 08:50

## 2017-01-14 RX ADMIN — DICYCLOMINE HYDROCHLORIDE 20 MG: 20 TABLET ORAL at 09:01

## 2017-01-14 RX ADMIN — METHYLPREDNISOLONE SODIUM SUCCINATE 40 MG: 40 INJECTION, POWDER, FOR SOLUTION INTRAMUSCULAR; INTRAVENOUS at 13:24

## 2017-01-14 RX ADMIN — METRONIDAZOLE 500 MG: 500 TABLET ORAL at 22:10

## 2017-01-14 RX ADMIN — METRONIDAZOLE 500 MG: 500 TABLET ORAL at 06:37

## 2017-01-14 RX ADMIN — MESALAMINE 2400 MG: 1.2 TABLET, DELAYED RELEASE ORAL at 18:14

## 2017-01-14 RX ADMIN — HYDROMORPHONE HYDROCHLORIDE 1 MG: 1 INJECTION, SOLUTION INTRAMUSCULAR; INTRAVENOUS; SUBCUTANEOUS at 22:10

## 2017-01-14 RX ADMIN — Medication 10 ML: at 13:24

## 2017-01-14 RX ADMIN — METHYLPREDNISOLONE SODIUM SUCCINATE 40 MG: 40 INJECTION, POWDER, FOR SOLUTION INTRAMUSCULAR; INTRAVENOUS at 06:37

## 2017-01-14 RX ADMIN — FOLIC ACID 1 MG: 1 TABLET ORAL at 18:14

## 2017-01-14 RX ADMIN — MESALAMINE 2400 MG: 1.2 TABLET, DELAYED RELEASE ORAL at 09:05

## 2017-01-14 RX ADMIN — METRONIDAZOLE 500 MG: 500 TABLET ORAL at 13:24

## 2017-01-14 RX ADMIN — Medication 5 ML: at 06:37

## 2017-01-14 NOTE — PROGRESS NOTES
Bedside report received from night nurse Yoseph. . Assessment done as noted  Respiration even and unlabored 20/min; denies pain or nausea at present. Encouraged to call with needs.

## 2017-01-14 NOTE — PROGRESS NOTES
GI DAILY PROGRESS NOTE    Admit Date:  1/12/2017    Today's Date:  1/14/2017    CC:  UC, C diff    Subjective:     Patient with continued lower abdominal pain, unchanged and worse with stool. Has had 4 loose stools since early morning. Is still seeing some small amounts of red blood. He has had no increased symptoms with full liquids. Denies n/v.  Pain medication helps the abdominal pain. Medications:   Current Facility-Administered Medications   Medication Dose Route Frequency    metroNIDAZOLE (FLAGYL) tablet 500 mg  500 mg Oral Q8H    sodium chloride (NS) flush 5-10 mL  5-10 mL IntraVENous Q8H    sodium chloride (NS) flush 5-10 mL  5-10 mL IntraVENous PRN    dicyclomine (BENTYL) tablet 20 mg  20 mg Oral Q6H PRN    ondansetron (ZOFRAN) injection 4 mg  4 mg IntraVENous Q4H PRN    acetaminophen (TYLENOL) tablet 650 mg  650 mg Oral Q4H PRN    HYDROcodone-acetaminophen (NORCO) 7.5-325 mg per tablet 1 Tab  1 Tab Oral Q4H PRN    HYDROmorphone (PF) (DILAUDID) injection 1 mg  1 mg IntraVENous Q4H PRN    naloxone (NARCAN) injection 0.4 mg  0.4 mg IntraVENous PRN    zolpidem (AMBIEN) tablet 5 mg  5 mg Oral QHS PRN    0.9% sodium chloride infusion  100 mL/hr IntraVENous CONTINUOUS    methylPREDNISolone (PF) (SOLU-MEDROL) injection 40 mg  40 mg IntraVENous Q8H    pantoprazole (PROTONIX) 40 mg in sodium chloride 0.9 % 10 mL injection  40 mg IntraVENous DAILY    promethazine (PHENERGAN) tablet 25 mg  25 mg Oral Q6H PRN    mesalamine (LIALDA) delayed release tablet 2,400 mg  2,400 mg Oral BID WITH MEALS    SE-Tan Plus MV Combo 1 Cap  1 Cap Oral DAILY       Review of Systems:  ROS was obtained, with pertinent positives as listed above. No chest pain or SOB.     Diet:  Full liquids    Objective:   Vitals:  Visit Vitals    /71 (BP 1 Location: Right arm, BP Patient Position: At rest)    Pulse 78    Temp 97.4 °F (36.3 °C)    Resp 18    Ht 5' 9\" (1.753 m)    Wt 94 kg (207 lb 4.8 oz)    SpO2 97%  BMI 30.61 kg/m2     Intake/Output:     01/12 1901 - 01/14 0700  In: 1440 [P.O.:1440]  Out: 100 [Urine:100]  Exam:  General appearance: alert, cooperative, no distress  Lungs: clear to auscultation bilaterally anteriorly  Heart: regular rate and rhythm  Abdomen: soft, non tender to exam with active bowel sounds. No masses, no organomegaly  Extremities: extremities normal, atraumatic, no cyanosis or edema  Neuro:  alert and oriented    Data Review (Labs):    Recent Labs      01/14/17   0534  01/13/17   0550  01/12/17   1045   WBC  18.0*  14.3*  17.9*   HGB  9.8*  10.9*  11.2*   HCT  33.2*  36.5*  37.0*   PLT  431  391  386   MCV  83.4  84.7  84.5   NA  145  139  135*   K  3.6  3.8  5.0   CL  110*  103  101   CO2  26  26  23   BUN  7  10  10   CREA  0.61*  0.89  1.06   CA  8.9  8.8  8.6   GLU  119*  109*  99   AP   --   82  81   SGOT   --   11*  79*   ALT   --   16  22   TBILI   --   0.2  0.4   ALB   --   2.8*  2.9*   TP   --   7.5  7.9   LPSE   --    --   60*       Assessment:     Active Problems:  Diarrhea (12/1/2016)  Rectal bleeding (1/12/2017)  Colitis, ulcerative chronic (HCC) (1/12/2017)  Fever (1/12/2017)  Ulcerative colitis, chronic (HCC) (1/12/2017)  C difficile    Plan:     40 y.o. male with PMHx of Chronic Ulcerative Colitis > 20 years well known to Dr. Gigi Robertson, who presented to ER for fever of 104 F, CT consistent with a flare-up of Colitis. Stools positive for C diff. He was started on Remicade therapy during last hospitalization on 12/9/16, and received the second dose on 12/22/16. He is due for 3rd dose on 1/19/17. He has failed outpatient treatment with Prednisone, with dose recently increased to 40 mg after initial taper to 10 mg, from a discharge dose of 40 mg daily. WBC elevation likely secondary to steroids. Hgb down 1 gm overnight. 1.  Continue po Flagyl day #2 of 14 for c difficile. Continue IV solumedrol, & IVF. 2.  Hold diet at full liquids for now   3.   Options for switching to an alternate anti-TNF or to the more recently approved Entyvio was discussed. May need to postpone Remicade infusion scheduled for 1/19/17. 4.  TPMT pending per lab; follow hgb  5. Consider addition of Imuran or 6 MP for dual therapy. Yoseph Enrique NP      Patient seen and examined. Agree with above. Discussed assessment and plans with patient. Wants to resume Folic Acid he was taking at home. Will order. BSL mildly elevated likely secondary to steroids. Check CRP in am. May advance to GI soft diet if continued improvement. Fanny Adair MD

## 2017-01-14 NOTE — PROGRESS NOTES
Shift assessment completed. Patient alert and oriented x 4. Normal saline infusing at 100 cc/hr. Family member at bedside. Pt. On RA. Respirations even and unlabored. No acute distress noted. Bed low, locked, call bell within reach. Side rails up x 2.

## 2017-01-15 LAB
ANION GAP BLD CALC-SCNC: 9 MMOL/L (ref 7–16)
BACTERIA SPEC CULT: NORMAL
BUN SERPL-MCNC: 5 MG/DL (ref 6–23)
CALCIUM SERPL-MCNC: 8.6 MG/DL (ref 8.3–10.4)
CHLORIDE SERPL-SCNC: 111 MMOL/L (ref 98–107)
CO2 SERPL-SCNC: 26 MMOL/L (ref 21–32)
CREAT SERPL-MCNC: 0.61 MG/DL (ref 0.8–1.5)
CRP SERPL-MCNC: 8.9 MG/DL (ref 0–0.9)
GLUCOSE SERPL-MCNC: 113 MG/DL (ref 65–100)
HCT VFR BLD AUTO: 30.6 % (ref 41.1–50.3)
HGB BLD-MCNC: 9 G/DL (ref 13.6–17.2)
POTASSIUM SERPL-SCNC: 3.7 MMOL/L (ref 3.5–5.1)
SERVICE CMNT-IMP: NORMAL
SODIUM SERPL-SCNC: 146 MMOL/L (ref 136–145)

## 2017-01-15 PROCEDURE — C9113 INJ PANTOPRAZOLE SODIUM, VIA: HCPCS | Performed by: INTERNAL MEDICINE

## 2017-01-15 PROCEDURE — 74011000250 HC RX REV CODE- 250: Performed by: INTERNAL MEDICINE

## 2017-01-15 PROCEDURE — 65270000029 HC RM PRIVATE

## 2017-01-15 PROCEDURE — 86140 C-REACTIVE PROTEIN: CPT | Performed by: NURSE PRACTITIONER

## 2017-01-15 PROCEDURE — 74011250636 HC RX REV CODE- 250/636: Performed by: INTERNAL MEDICINE

## 2017-01-15 PROCEDURE — 80048 BASIC METABOLIC PNL TOTAL CA: CPT | Performed by: NURSE PRACTITIONER

## 2017-01-15 PROCEDURE — 74011250637 HC RX REV CODE- 250/637: Performed by: INTERNAL MEDICINE

## 2017-01-15 PROCEDURE — 85018 HEMOGLOBIN: CPT | Performed by: NURSE PRACTITIONER

## 2017-01-15 PROCEDURE — 36415 COLL VENOUS BLD VENIPUNCTURE: CPT | Performed by: NURSE PRACTITIONER

## 2017-01-15 RX ADMIN — DICYCLOMINE HYDROCHLORIDE 20 MG: 20 TABLET ORAL at 09:09

## 2017-01-15 RX ADMIN — MESALAMINE 2400 MG: 1.2 TABLET, DELAYED RELEASE ORAL at 17:15

## 2017-01-15 RX ADMIN — FOLIC ACID 1 MG: 1 TABLET ORAL at 09:09

## 2017-01-15 RX ADMIN — MESALAMINE 2400 MG: 1.2 TABLET, DELAYED RELEASE ORAL at 09:09

## 2017-01-15 RX ADMIN — SODIUM CHLORIDE 100 ML/HR: 900 INJECTION, SOLUTION INTRAVENOUS at 01:05

## 2017-01-15 RX ADMIN — Medication 10 ML: at 22:52

## 2017-01-15 RX ADMIN — Medication 10 ML: at 15:03

## 2017-01-15 RX ADMIN — METRONIDAZOLE 500 MG: 500 TABLET ORAL at 15:02

## 2017-01-15 RX ADMIN — METHYLPREDNISOLONE SODIUM SUCCINATE 40 MG: 40 INJECTION, POWDER, FOR SOLUTION INTRAMUSCULAR; INTRAVENOUS at 15:02

## 2017-01-15 RX ADMIN — SODIUM CHLORIDE 100 ML/HR: 900 INJECTION, SOLUTION INTRAVENOUS at 17:14

## 2017-01-15 RX ADMIN — SODIUM CHLORIDE 100 ML/HR: 900 INJECTION, SOLUTION INTRAVENOUS at 09:14

## 2017-01-15 RX ADMIN — Medication 10 ML: at 05:55

## 2017-01-15 RX ADMIN — METRONIDAZOLE 500 MG: 500 TABLET ORAL at 22:49

## 2017-01-15 RX ADMIN — METHYLPREDNISOLONE SODIUM SUCCINATE 40 MG: 40 INJECTION, POWDER, FOR SOLUTION INTRAMUSCULAR; INTRAVENOUS at 05:50

## 2017-01-15 RX ADMIN — ZOLPIDEM TARTRATE 5 MG: 5 TABLET, FILM COATED ORAL at 22:52

## 2017-01-15 RX ADMIN — HYDROMORPHONE HYDROCHLORIDE 1 MG: 1 INJECTION, SOLUTION INTRAMUSCULAR; INTRAVENOUS; SUBCUTANEOUS at 14:55

## 2017-01-15 RX ADMIN — METRONIDAZOLE 500 MG: 500 TABLET ORAL at 05:50

## 2017-01-15 RX ADMIN — METHYLPREDNISOLONE SODIUM SUCCINATE 40 MG: 40 INJECTION, POWDER, FOR SOLUTION INTRAMUSCULAR; INTRAVENOUS at 22:49

## 2017-01-15 RX ADMIN — SODIUM CHLORIDE 40 MG: 9 INJECTION INTRAMUSCULAR; INTRAVENOUS; SUBCUTANEOUS at 05:55

## 2017-01-15 NOTE — PROGRESS NOTES
Dilaudid 1 mg IV given slow push. Will monitor.      01/14/17 2210   Pain 1   Pain Scale 1 Numeric (0 - 10)   Pain Intensity 1 9   Pain Onset 1 acute   Pain Location 1 Abdomen   Pain Orientation 1 Lower   Pain Description 1 Burning;Aching   Pain Intervention(s) 1 Medication (see MAR)

## 2017-01-15 NOTE — PROGRESS NOTES
Dilaudid effective.      01/14/17 2351   Vital Signs   Temp 98.6 °F (37 °C)   Temp Source Oral   Pulse (Heart Rate) 79   Resp Rate 18   O2 Sat (%) 96 %   Level of Consciousness Alert   /79   MAP (Calculated) 97   BP 1 Method Automatic   BP 1 Location Right arm   BP Patient Position At rest   MEWS Score 1   Pain 1   Pain Scale 1 Numeric (0 - 10)   Pain Intensity 1 0   Pain Reassessment 1 Yes   Patient Observation   Hourly Rounds Yes

## 2017-01-15 NOTE — PROGRESS NOTES
GI DAILY PROGRESS NOTE    Admit Date:  1/12/2017    Today's Date:  1/15/2017    CC:  UC, C diff    Subjective:     Patient reports lower abdominal pain yesterday which seems improved this morning. Still having rectal pain with stools. Blood present is stools but he does not feel it has increased, despite drop in hgb,  Tolerating full liquids but does not wish to advance diet. Medications:   Current Facility-Administered Medications   Medication Dose Route Frequency    folic acid (FOLVITE) tablet 1 mg  1 mg Oral DAILY    metroNIDAZOLE (FLAGYL) tablet 500 mg  500 mg Oral Q8H    sodium chloride (NS) flush 5-10 mL  5-10 mL IntraVENous Q8H    sodium chloride (NS) flush 5-10 mL  5-10 mL IntraVENous PRN    dicyclomine (BENTYL) tablet 20 mg  20 mg Oral Q6H PRN    ondansetron (ZOFRAN) injection 4 mg  4 mg IntraVENous Q4H PRN    acetaminophen (TYLENOL) tablet 650 mg  650 mg Oral Q4H PRN    HYDROcodone-acetaminophen (NORCO) 7.5-325 mg per tablet 1 Tab  1 Tab Oral Q4H PRN    HYDROmorphone (PF) (DILAUDID) injection 1 mg  1 mg IntraVENous Q4H PRN    naloxone (NARCAN) injection 0.4 mg  0.4 mg IntraVENous PRN    zolpidem (AMBIEN) tablet 5 mg  5 mg Oral QHS PRN    0.9% sodium chloride infusion  100 mL/hr IntraVENous CONTINUOUS    methylPREDNISolone (PF) (SOLU-MEDROL) injection 40 mg  40 mg IntraVENous Q8H    pantoprazole (PROTONIX) 40 mg in sodium chloride 0.9 % 10 mL injection  40 mg IntraVENous DAILY    promethazine (PHENERGAN) tablet 25 mg  25 mg Oral Q6H PRN    mesalamine (LIALDA) delayed release tablet 2,400 mg  2,400 mg Oral BID WITH MEALS    SE-Tan Plus MV Combo 1 Cap  1 Cap Oral DAILY       Review of Systems:  ROS was obtained, with pertinent positives as listed above. No chest pain or SOB.     Diet:  Full liquids    Objective:   Vitals:  Visit Vitals    /79 (BP 1 Location: Left arm, BP Patient Position: At rest)    Pulse (!) 59    Temp 98.1 °F (36.7 °C)    Resp 17    Ht 5' 9\" (1.753 m)  Wt 94 kg (207 lb 4.8 oz)    SpO2 90%    BMI 30.61 kg/m2     Intake/Output:     01/13 1901 - 01/15 0700  In: 720 [P.O.:720]  Out: -   Exam:  General appearance: alert, cooperative, no distress  Lungs: clear to auscultation bilaterally anteriorly  Heart: regular rate and rhythm  Abdomen: soft, non tender to exam with active bowel sounds. No masses, no organomegaly  Extremities: extremities normal, atraumatic, no cyanosis or edema  Neuro:  alert and oriented    Data Review (Labs):    Recent Labs      01/15/17   0653  01/14/17   0534  01/13/17   0550  01/12/17   1045   WBC   --   18.0*  14.3*  17.9*   HGB  9.0*  9.8*  10.9*  11.2*   HCT  30.6*  33.2*  36.5*  37.0*   PLT   --   431  391  386   MCV   --   83.4  84.7  84.5   NA  146*  145  139  135*   K  3.7  3.6  3.8  5.0   CL  111*  110*  103  101   CO2  26  26  26  23   BUN  5*  7  10  10   CREA  0.61*  0.61*  0.89  1.06   CA  8.6  8.9  8.8  8.6   GLU  113*  119*  109*  99   AP   --    --   82  81   SGOT   --    --   11*  79*   ALT   --    --   16  22   TBILI   --    --   0.2  0.4   ALB   --    --   2.8*  2.9*   TP   --    --   7.5  7.9   LPSE   --    --    --   60*       Assessment:     Active Problems:  Diarrhea (12/1/2016)  Rectal bleeding (1/12/2017)  Colitis, ulcerative chronic (HCC) (1/12/2017)  Fever (1/12/2017)  Ulcerative colitis, chronic (HCC) (1/12/2017)  C difficile    Plan:     40 y.o. male with PMHx of Chronic Ulcerative Colitis > 20 years well known to Dr. Vazquez Kaiser, who presented to ER for fever of 104 F, CT consistent with a flare-up of Colitis. Stools positive for C diff. He was started on Remicade therapy during last hospitalization on 12/9/16, and received the second dose on 12/22/16. He is due for 3rd dose on 1/19/17. He has failed outpatient treatment with Prednisone, with dose recently increased to 40 mg after initial taper to 10 mg, from a discharge dose of 40 mg daily. WBC elevation likely secondary to steroids.   Hgb down again overnight to 9.0. CRP this morning improved greatly from admission values. 1.  Continue po Flagyl day #3 of 14 for c difficile. Continue IV solumedrol, & IVF. 2.  Hold diet at full liquids for now; he does not wish to advance   3. May need to postpone Remicade infusion scheduled for 1/19/17. 4.  TPMT pending per lab  5. Follow hgb; is on oral iron supplementation    Tez Mosqueda NP      Patient seen and examined. Agree with above. Discussed assessment and plans with patient. Decreased Hgb partially dilutional.  Will decrease IVF - reports adequate po intake and urinary frequency - large volume. If not improving consider po Vanco or limited Colonoscopy to assess for C diff versus UC flare. Advance diet if improving and patient desires. Fanny Castro MD

## 2017-01-15 NOTE — PROGRESS NOTES
Reported 6 BM during this shift per pt. Denies pain at present. States Bentyl is helping. Verbal bedside report given to oncoming nurse Vesta Hernandezr. Patient's situation, background, assessment and recommendations provided. Opportunity for questions provided. No s/s of pain noted. No distress noted. Oncoming RN assumed care of patient.

## 2017-01-15 NOTE — PROGRESS NOTES
Received bedside shift report from Barbara Gong RN. Pt lying in bed. No apparent distress. Respirations even and unlabored. Spouse at bedside. Instructed to call for assistance with needs, as they arise. Pt voiced understanding.

## 2017-01-16 LAB
BASOPHILS # BLD AUTO: 0 K/UL (ref 0–0.2)
BASOPHILS # BLD: 0 % (ref 0–2)
DIFFERENTIAL METHOD BLD: ABNORMAL
EOSINOPHIL # BLD: 0 K/UL (ref 0–0.8)
EOSINOPHIL NFR BLD: 0 % (ref 0.5–7.8)
ERYTHROCYTE [DISTWIDTH] IN BLOOD BY AUTOMATED COUNT: 19.4 % (ref 11.9–14.6)
GLUCOSE BLD STRIP.AUTO-MCNC: 100 MG/DL (ref 65–100)
GLUCOSE BLD STRIP.AUTO-MCNC: 112 MG/DL (ref 65–100)
GLUCOSE BLD STRIP.AUTO-MCNC: 118 MG/DL (ref 65–100)
HCT VFR BLD AUTO: 30.7 % (ref 41.1–50.3)
HGB BLD-MCNC: 9 G/DL (ref 13.6–17.2)
IMM GRANULOCYTES # BLD: 0.1 K/UL (ref 0–0.5)
IMM GRANULOCYTES NFR BLD AUTO: 0.7 % (ref 0–5)
LYMPHOCYTES # BLD AUTO: 13 % (ref 13–44)
LYMPHOCYTES # BLD: 1.7 K/UL (ref 0.5–4.6)
MCH RBC QN AUTO: 24.5 PG (ref 26.1–32.9)
MCHC RBC AUTO-ENTMCNC: 29.3 G/DL (ref 31.4–35)
MCV RBC AUTO: 83.4 FL (ref 79.6–97.8)
MONOCYTES # BLD: 0.4 K/UL (ref 0.1–1.3)
MONOCYTES NFR BLD AUTO: 3 % (ref 4–12)
NEUTS SEG # BLD: 11.5 K/UL (ref 1.7–8.2)
NEUTS SEG NFR BLD AUTO: 83 % (ref 43–78)
PLATELET # BLD AUTO: 447 K/UL (ref 150–450)
PMV BLD AUTO: 9.3 FL (ref 10.8–14.1)
RBC # BLD AUTO: 3.68 M/UL (ref 4.23–5.67)
WBC # BLD AUTO: 13.7 K/UL (ref 4.3–11.1)

## 2017-01-16 PROCEDURE — 74011250637 HC RX REV CODE- 250/637: Performed by: INTERNAL MEDICINE

## 2017-01-16 PROCEDURE — 82962 GLUCOSE BLOOD TEST: CPT

## 2017-01-16 PROCEDURE — 85025 COMPLETE CBC W/AUTO DIFF WBC: CPT | Performed by: NURSE PRACTITIONER

## 2017-01-16 PROCEDURE — C9113 INJ PANTOPRAZOLE SODIUM, VIA: HCPCS | Performed by: INTERNAL MEDICINE

## 2017-01-16 PROCEDURE — 65270000029 HC RM PRIVATE

## 2017-01-16 PROCEDURE — 74011250636 HC RX REV CODE- 250/636: Performed by: INTERNAL MEDICINE

## 2017-01-16 PROCEDURE — 36415 COLL VENOUS BLD VENIPUNCTURE: CPT | Performed by: NURSE PRACTITIONER

## 2017-01-16 PROCEDURE — 74011000250 HC RX REV CODE- 250: Performed by: INTERNAL MEDICINE

## 2017-01-16 RX ADMIN — METRONIDAZOLE 500 MG: 500 TABLET ORAL at 06:21

## 2017-01-16 RX ADMIN — METRONIDAZOLE 500 MG: 500 TABLET ORAL at 22:01

## 2017-01-16 RX ADMIN — ZOLPIDEM TARTRATE 5 MG: 5 TABLET, FILM COATED ORAL at 22:01

## 2017-01-16 RX ADMIN — SODIUM CHLORIDE 40 MG: 9 INJECTION INTRAMUSCULAR; INTRAVENOUS; SUBCUTANEOUS at 06:22

## 2017-01-16 RX ADMIN — Medication 10 ML: at 22:02

## 2017-01-16 RX ADMIN — SODIUM CHLORIDE 50 ML/HR: 900 INJECTION, SOLUTION INTRAVENOUS at 18:17

## 2017-01-16 RX ADMIN — METHYLPREDNISOLONE SODIUM SUCCINATE 40 MG: 40 INJECTION, POWDER, FOR SOLUTION INTRAMUSCULAR; INTRAVENOUS at 06:22

## 2017-01-16 RX ADMIN — Medication 5 ML: at 18:18

## 2017-01-16 RX ADMIN — MESALAMINE 2400 MG: 1.2 TABLET, DELAYED RELEASE ORAL at 18:07

## 2017-01-16 RX ADMIN — MESALAMINE 2400 MG: 1.2 TABLET, DELAYED RELEASE ORAL at 08:28

## 2017-01-16 RX ADMIN — FOLIC ACID 1 MG: 1 TABLET ORAL at 08:28

## 2017-01-16 RX ADMIN — METHYLPREDNISOLONE SODIUM SUCCINATE 40 MG: 40 INJECTION, POWDER, FOR SOLUTION INTRAMUSCULAR; INTRAVENOUS at 22:01

## 2017-01-16 RX ADMIN — Medication 5 ML: at 06:00

## 2017-01-16 RX ADMIN — METHYLPREDNISOLONE SODIUM SUCCINATE 40 MG: 40 INJECTION, POWDER, FOR SOLUTION INTRAMUSCULAR; INTRAVENOUS at 13:43

## 2017-01-16 RX ADMIN — HYDROMORPHONE HYDROCHLORIDE 1 MG: 1 INJECTION, SOLUTION INTRAMUSCULAR; INTRAVENOUS; SUBCUTANEOUS at 18:12

## 2017-01-16 RX ADMIN — METRONIDAZOLE 500 MG: 500 TABLET ORAL at 13:42

## 2017-01-16 NOTE — PROGRESS NOTES
PM assessment done. No changes noted. Respiration even and unlabored 20/min at rest. No s/s of pain noted at present. Encouraged to call with needs.

## 2017-01-16 NOTE — PROGRESS NOTES
Bedside report received from night nurse Neeta Simms. Assessment done as noted  Respiration even and unlabored 20/min; denies pain or nausea at present. Encouraged to call with needs.

## 2017-01-16 NOTE — PROGRESS NOTES
Pt resting quietly in bed, HOB elevated. PT A&Ox4 on RA. Lung sounds clear, S1/S2 audible, peripheral pulses palpable, extremities warm. Pt has IV to right AC, site clean, dry, and intact. Pt denies pain or SOB at this time. Call light in place, pt instructed to call for assistance as needed.

## 2017-01-16 NOTE — PROGRESS NOTES
GI DAILY PROGRESS NOTE    Admit Date:  1/12/2017    Today's Date:  1/16/2017    CC:  C. Diff, UC    Subjective:     Patient sitting in bed. Reports improvement in frequency of stools. Had 3-4 loose stools yesterday. Has had 2 loose stools today. Intermittent BRB mixed in with stool. Reports that lower abdominal pain after BM has improved. He and his wife have questions about C.diff and treatment. Questions answered. He would like to advance diet, if able. Medications:   Current Facility-Administered Medications   Medication Dose Route Frequency    folic acid (FOLVITE) tablet 1 mg  1 mg Oral DAILY    metroNIDAZOLE (FLAGYL) tablet 500 mg  500 mg Oral Q8H    sodium chloride (NS) flush 5-10 mL  5-10 mL IntraVENous Q8H    sodium chloride (NS) flush 5-10 mL  5-10 mL IntraVENous PRN    dicyclomine (BENTYL) tablet 20 mg  20 mg Oral Q6H PRN    ondansetron (ZOFRAN) injection 4 mg  4 mg IntraVENous Q4H PRN    acetaminophen (TYLENOL) tablet 650 mg  650 mg Oral Q4H PRN    HYDROcodone-acetaminophen (NORCO) 7.5-325 mg per tablet 1 Tab  1 Tab Oral Q4H PRN    HYDROmorphone (PF) (DILAUDID) injection 1 mg  1 mg IntraVENous Q4H PRN    naloxone (NARCAN) injection 0.4 mg  0.4 mg IntraVENous PRN    zolpidem (AMBIEN) tablet 5 mg  5 mg Oral QHS PRN    0.9% sodium chloride infusion  50 mL/hr IntraVENous CONTINUOUS    methylPREDNISolone (PF) (SOLU-MEDROL) injection 40 mg  40 mg IntraVENous Q8H    pantoprazole (PROTONIX) 40 mg in sodium chloride 0.9 % 10 mL injection  40 mg IntraVENous DAILY    promethazine (PHENERGAN) tablet 25 mg  25 mg Oral Q6H PRN    mesalamine (LIALDA) delayed release tablet 2,400 mg  2,400 mg Oral BID WITH MEALS    SE-Tan Plus MV Combo 1 Cap  1 Cap Oral DAILY       Review of Systems:  ROS was obtained, with pertinent positives as listed above. No chest pain or SOB.     Diet:  Full Liquid    Objective:   Vitals:  Visit Vitals    BP (!) 148/100 (BP 1 Location: Left arm, BP Patient Position: Post activity)    Pulse 80    Temp 98.9 °F (37.2 °C)    Resp 18    Ht 5' 9\" (1.753 m)    Wt 94 kg (207 lb 4.8 oz)    SpO2 97%    BMI 30.61 kg/m2     Intake/Output:     01/14 1901 - 01/16 0700  In: 8647 [P.O.:1320; I.V.:1150]  Out: 6 [Urine:5]  Exam:  General appearance: alert, cooperative, no distress  Lungs: clear to auscultation bilaterally anteriorly  Heart: regular rate and rhythm  Abdomen: soft, non-tender. Bowel sounds normal. No masses, no organomegaly  Extremities: extremities normal, atraumatic, no cyanosis or edema  Neuro:  alert and oriented    Data Review (Labs):    Recent Labs      01/16/17   0720  01/15/17   0653  01/14/17   0534   WBC  13.7*   --   18.0*   HGB  9.0*  9.0*  9.8*   HCT  30.7*  30.6*  33.2*   PLT  447   --   431   MCV  83.4   --   83.4   NA   --   146*  145   K   --   3.7  3.6   CL   --   111*  110*   CO2   --   26  26   BUN   --   5*  7   CREA   --   0.61*  0.61*   CA   --   8.6  8.9   GLU   --   113*  119*       Assessment:     Principal Problem:    Diarrhea (12/1/2016)    Active Problems:    Rectal bleeding (1/12/2017)      Colitis, ulcerative chronic ([facility]) (1/12/2017)      Fever (1/12/2017)      Ulcerative colitis, chronic ([facility] (1/12/2017)      40 y.o. male with PMHx of Chronic Ulcerative Colitis > 20 years well known to Dr. Renita Chang, who presented to ER for fever of 104 F, CT consistent with a flare-up of Colitis. Stools positive for C diff. He was started on Remicade therapy during last hospitalization on 12/9/16, and received the second dose on 12/22/16. He is due for 3rd dose on 1/19/17. He has failed outpatient treatment with Prednisone, with dose recently increased to 40 mg after initial taper to 10 mg, from a discharge dose of 40 mg daily. WBC elevation likely secondary to steroids but improving. HGB is stable at 9.0. Plan:   1. Continue po Flagyl day #4 of 14 for c difficile. Continue IV solumedrol, & IVF. 2. Full LIiquid Diet. May advance diet later today.    3. May need to postpone Remicade infusion scheduled for 1/19/17. 4. TPMT pending per lab  5. Follow hgb; is on oral iron supplementation  6. IVF: NS at 50ml/hour. 7. Possible discharge tomorrow. Dr. Marv Kruse to follow with further recommendations. Jone Peña NP    Patient is seen and examined in collaboration with Dr. Marv Kruse. Assessment and plan as per Dr. Marv Kruse. GI Attending/ Better today per his report with less pain and less frequent BMs. Abdomen with mild tenderness. Plans as above. Advance to a soft diet.   Emelina Mora MD

## 2017-01-16 NOTE — PROGRESS NOTES
Alert  No complaints  Still with loose stools  Says frequency less  Continue iv fluid  Site without redness or edema  Denies rectal bleeding

## 2017-01-16 NOTE — PROGRESS NOTES
Reports 3 BMs this shift. Verbal bedside report given to oncoming nurse [place]. . Patient's situation, background, assessment and recommendations provided. Opportunity for questions provided. No s/s of pain noted. No distress noted. Oncoming RN assumed care of patient.

## 2017-01-17 VITALS
HEIGHT: 69 IN | HEART RATE: 67 BPM | OXYGEN SATURATION: 97 % | BODY MASS INDEX: 30.53 KG/M2 | RESPIRATION RATE: 20 BRPM | WEIGHT: 206.1 LBS | SYSTOLIC BLOOD PRESSURE: 141 MMHG | DIASTOLIC BLOOD PRESSURE: 77 MMHG | TEMPERATURE: 98.1 F

## 2017-01-17 LAB
ANION GAP BLD CALC-SCNC: 8 MMOL/L (ref 7–16)
BUN SERPL-MCNC: 7 MG/DL (ref 6–23)
CALCIUM SERPL-MCNC: 8 MG/DL (ref 8.3–10.4)
CHLORIDE SERPL-SCNC: 109 MMOL/L (ref 98–107)
CO2 SERPL-SCNC: 27 MMOL/L (ref 21–32)
CREAT SERPL-MCNC: 0.65 MG/DL (ref 0.8–1.5)
ERYTHROCYTE [DISTWIDTH] IN BLOOD BY AUTOMATED COUNT: 19.3 % (ref 11.9–14.6)
GLUCOSE BLD STRIP.AUTO-MCNC: 110 MG/DL (ref 65–100)
GLUCOSE BLD STRIP.AUTO-MCNC: 124 MG/DL (ref 65–100)
GLUCOSE SERPL-MCNC: 110 MG/DL (ref 65–100)
HCT VFR BLD AUTO: 28.6 % (ref 41.1–50.3)
HGB BLD-MCNC: 8.4 G/DL (ref 13.6–17.2)
MCH RBC QN AUTO: 24.3 PG (ref 26.1–32.9)
MCHC RBC AUTO-ENTMCNC: 29.4 G/DL (ref 31.4–35)
MCV RBC AUTO: 82.7 FL (ref 79.6–97.8)
PLATELET # BLD AUTO: 551 K/UL (ref 150–450)
PMV BLD AUTO: 8.9 FL (ref 10.8–14.1)
POTASSIUM SERPL-SCNC: 3.3 MMOL/L (ref 3.5–5.1)
RBC # BLD AUTO: 3.46 M/UL (ref 4.23–5.67)
SODIUM SERPL-SCNC: 144 MMOL/L (ref 136–145)
WBC # BLD AUTO: 12.7 K/UL (ref 4.3–11.1)

## 2017-01-17 PROCEDURE — 80048 BASIC METABOLIC PNL TOTAL CA: CPT | Performed by: INTERNAL MEDICINE

## 2017-01-17 PROCEDURE — 82962 GLUCOSE BLOOD TEST: CPT

## 2017-01-17 PROCEDURE — 36415 COLL VENOUS BLD VENIPUNCTURE: CPT | Performed by: INTERNAL MEDICINE

## 2017-01-17 PROCEDURE — C9113 INJ PANTOPRAZOLE SODIUM, VIA: HCPCS | Performed by: INTERNAL MEDICINE

## 2017-01-17 PROCEDURE — 74011000250 HC RX REV CODE- 250: Performed by: INTERNAL MEDICINE

## 2017-01-17 PROCEDURE — 85027 COMPLETE CBC AUTOMATED: CPT | Performed by: INTERNAL MEDICINE

## 2017-01-17 PROCEDURE — 74011250637 HC RX REV CODE- 250/637: Performed by: INTERNAL MEDICINE

## 2017-01-17 PROCEDURE — 74011250637 HC RX REV CODE- 250/637: Performed by: NURSE PRACTITIONER

## 2017-01-17 PROCEDURE — 74011250636 HC RX REV CODE- 250/636: Performed by: INTERNAL MEDICINE

## 2017-01-17 RX ORDER — METRONIDAZOLE 500 MG/1
500 TABLET ORAL EVERY 8 HOURS
Qty: 27 TAB | Refills: 0 | Status: SHIPPED | OUTPATIENT
Start: 2017-01-17 | End: 2017-02-06

## 2017-01-17 RX ORDER — POTASSIUM CHLORIDE 20 MEQ/1
20 TABLET, EXTENDED RELEASE ORAL
Status: COMPLETED | OUTPATIENT
Start: 2017-01-17 | End: 2017-01-17

## 2017-01-17 RX ORDER — HYDROCORTISONE ACETATE 25 MG/1
25 SUPPOSITORY RECTAL EVERY 12 HOURS
Qty: 14 SUPPOSITORY | Refills: 0 | Status: ON HOLD | OUTPATIENT
Start: 2017-01-17 | End: 2017-02-06

## 2017-01-17 RX ORDER — PREDNISONE 10 MG/1
TABLET ORAL
Qty: 70 TAB | Refills: 0 | Status: ON HOLD | OUTPATIENT
Start: 2017-01-17 | End: 2017-02-06

## 2017-01-17 RX ORDER — HYDROCORTISONE ACETATE 25 MG/1
25 SUPPOSITORY RECTAL EVERY 12 HOURS
Status: DISCONTINUED | OUTPATIENT
Start: 2017-01-17 | End: 2017-01-17 | Stop reason: HOSPADM

## 2017-01-17 RX ADMIN — METHYLPREDNISOLONE SODIUM SUCCINATE 40 MG: 40 INJECTION, POWDER, FOR SOLUTION INTRAMUSCULAR; INTRAVENOUS at 06:24

## 2017-01-17 RX ADMIN — METRONIDAZOLE 500 MG: 500 TABLET ORAL at 06:24

## 2017-01-17 RX ADMIN — METRONIDAZOLE 500 MG: 500 TABLET ORAL at 13:48

## 2017-01-17 RX ADMIN — SODIUM CHLORIDE 50 ML/HR: 900 INJECTION, SOLUTION INTRAVENOUS at 13:53

## 2017-01-17 RX ADMIN — POTASSIUM CHLORIDE 20 MEQ: 20 TABLET, EXTENDED RELEASE ORAL at 11:36

## 2017-01-17 RX ADMIN — HYDROCORTISONE ACETATE 25 MG: 25 SUPPOSITORY RECTAL at 13:52

## 2017-01-17 RX ADMIN — SODIUM CHLORIDE 40 MG: 9 INJECTION INTRAMUSCULAR; INTRAVENOUS; SUBCUTANEOUS at 06:24

## 2017-01-17 RX ADMIN — FOLIC ACID 1 MG: 1 TABLET ORAL at 08:04

## 2017-01-17 RX ADMIN — Medication 10 ML: at 13:51

## 2017-01-17 RX ADMIN — Medication 5 ML: at 06:25

## 2017-01-17 RX ADMIN — MESALAMINE 2400 MG: 1.2 TABLET, DELAYED RELEASE ORAL at 08:04

## 2017-01-17 RX ADMIN — METHYLPREDNISOLONE SODIUM SUCCINATE 40 MG: 40 INJECTION, POWDER, FOR SOLUTION INTRAMUSCULAR; INTRAVENOUS at 13:48

## 2017-01-17 NOTE — PROGRESS NOTES
Alert  Denies pain at present  Stools unchanged  Loose  Less frequent  Continue ivf  Site without redness or edema

## 2017-01-17 NOTE — ADT AUTH CERT NOTES
Utilization Review           Inflammatory Bowel Disease - Care Day 5 (1/16/2017) by Dianne Stafford RN        Review Status Review Entered       Completed 1/17/2017       Details              Care Day: 5 Care Date: 1/16/2017 Level of Care: Inpatient Floor       Guideline Day 3        Clinical Status       (X) * Afebrile       1/17/2017 12:26 PM EST by Maryan Lucia         VS: T 98.7, P85, R 18, /67, SPO2 97% RA              (X) * Hct stable       1/17/2017 12:26 PM EST by Maryan Lucia         HGB 9.0, HCT 30.7              (X) * No evidence of peritonitis or abscess       ( ) * Surgery not indicated       (X) * Diet tolerated       1/17/2017 12:26 PM EST by Maryan Lucia         GI SOFT DIET              (X) * Pain resolved or managed       1/17/2017 12:26 PM EST by Maryan Lucia         IV DILAUDID Q 4 HRS AS NEEDED X1              ( ) * Discharge plans and education understood              Activity       (X) * Ambulatory [F]       1/17/2017 12:26 PM EST by Maryan Lucia         UP AD [facility]                     Routes       ( ) * Oral hydration, medications, [G] and diet       1/17/2017 12:26 PM EST by Maryan Lucia         INJ PROTONIX IV  DAILY                     Medications       (X) Possible systemic corticosteroids       1/17/2017 12:26 PM EST by Maryan Lucia         IV SOLUMEDROL Q 8HRS              (X) Possible antibiotic, immunomodulator, or other agents       1/17/2017 12:26 PM EST by Maryan Lucia         PO FLAGYL Q 8HRS                     1/17/2017 12:26 PM EST by Maryan Lucia       Subject: Additional Clinical Information                      Current Facility-Administered Medications          Medication      Dose      Route      Frequency          â¢      folic acid (FOLVITE) tablet 1 mg       1 mg      Oral      DAILY          â¢      metroNIDAZOLE (FLAGYL) tablet 500 mg       500 mg      Oral      Q8H          â¢      sodium chloride (NS) flush 5-10 mL       5-10 mL      IntraVENous      Q8H          â¢      sodium chloride (NS) flush 5-10 mL       5-10 mL      IntraVENous      PRN          â¢      dicyclomine (BENTYL) tablet 20 mg       20 mg      Oral      Q6H PRN          â¢      ondansetron (ZOFRAN) injection 4 mg       4 mg      IntraVENous      Q4H PRN          â¢      acetaminophen (TYLENOL) tablet 650 mg       650 mg      Oral      Q4H PRN          â¢      HYDROcodone-acetaminophen (NORCO) 7.5-325 mg per tablet 1 Tab       1 Tab      Oral      Q4H PRN          â¢      HYDROmorphone (PF) (DILAUDID) injection 1 mg       1 mg      IntraVENous      Q4H PRN          â¢      naloxone (NARCAN) injection 0.4 mg       0.4 mg      IntraVENous      PRN          â¢      zolpidem (AMBIEN) tablet 5 mg       5 mg      Oral      QHS PRN          â¢      0.9% sodium chloride infusion       50 mL/hr      IntraVENous      CONTINUOUS          â¢      methylPREDNISolone (PF) (SOLU-MEDROL) injection 40 mg       40 mg      IntraVENous      Q8H          â¢      pantoprazole (PROTONIX) 40 mg in sodium chloride 0.9 % 10 mL injection       40 mg      IntraVENous      DAILY          â¢      promethazine (PHENERGAN) tablet 25 mg       25 mg      Oral      Q6H PRN          â¢      mesalamine (LIALDA) delayed release tablet 2,400 mg       2,400 mg      Oral      BID WITH MEALS          â¢      SE-Tan Plus MV Combo 1 Cap       1 Cap      Oral      DAILY                      1/17/2017 12:22 PM EST by Maddie Zheng       Subject: Additional Clinical Information              GI DAILY PROGRESS NOTE      Admit Date:   1/12/2017      Today's Date:   1/16/2017      CC:   C. Diff, UC              Subjective:              Patient sitting in bed. Reports improvement in frequency of stools. Had 3-4 loose stools yesterday. Has had 2 loose stools today. Intermittent BRB mixed in with stool. Reports that lower abdominal pain after BM has improved.  He and his wife have questions about C.diff and treatment. Questions answered. He would like to advance diet, if able.      Assessment:      Principal Problem:     Diarrhea (12/1/2016)      Active Problems:     Rectal bleeding (1/12/2017)         Colitis, ulcerative chronic (HCC) (1/12/2017)         Fever (1/12/2017)         Ulcerative colitis, chronic ([address] (1/12/2017)  Plan:  1. Continue po Flagyl day #4 of 14 for c difficile. Continue IV solumedrol, & IVF. 2. Full LIiquid Diet. May advance diet later today. 3. May need to postpone Remicade infusion scheduled for 1/19/17. 4. TPMT pending per lab  5. Follow hgb; is on oral iron supplementation  6. IVF: NS at 50ml/hour. 7. Possible discharge tomorrow. Dr. Farhana Guy to follow with further recommendations                                         * Milestone                  Inflammatory Bowel Disease - Care Day 2 (1/13/2017) by Silver Martinez RN        Review Status Review Entered       Completed 1/17/2017       Details              Care Day: 2 Care Date: 1/13/2017 Level of Care: Inpatient Floor       Guideline Day 2        Clinical Status       (X) * No vomiting       (X) * Hemodynamic stability       1/17/2017 12:20 PM EST by [facility]         VS: T 98.9, P 78, R 20, /76, SPO2 98% RA              (X) * Hct stable       1/17/2017 12:20 PM EST by [facility]         LABS: WBC 14.3,HGB 10.9, HCT 36.5M,, SGOT 11, ALB 2.8              (X) * Surgery not indicated              Routes       (X) * IV fluids, medication       1/17/2017 12:20 PM EST by [facility]         IV PROTONIX, IV FLUID 100ML. HR CONTINUOUS              (X) Liquid or advanced diet       1/17/2017 12:20 PM EST by [facility]         CLEAR LIQUID DIET                     Interventions       (X) CBC, electrolytes              Medications       (X) Oral, rectal, or IV steroids       1/17/2017 12:20 PM EST by [facility]         IV SOLUMEDROL              (X) Possible antibiotic, immunomodulator, or other agents              1/17/2017 12:21 PM EST by Ross Calvo       Subject: Additional Clinical Information       GI NOTE:  Patient feels better. Lower abdominal pain better, none this afternoon. Reports 5 loose BM since this morning. Some bright red blood noted. Tolerates clears. C diff positive. Reports having been treated w/ Amoxicillin like abx in Nov 2016 for skin infection. Plan:      Continue po Flagyl day #1, IV solumedrol, & IVF. Advance diet as tolerated. Options for switching to an alternate anti-TNF or to the more recently approved Entyvio was discussed. TPMT enzyme study was obtained on last admission but is not in [facility] care.    Consider repeat TPMT asssay if not obtained and addition of Imuran or 6 MP for dual therapy.                1/17/2017 12:20 PM EST by Ross Calvo       Subject: Additional Clinical Information                      Current Facility-Administered Medications          Medication      Dose      Route      Frequency          â¢      metroNIDAZOLE (FLAGYL) tablet 500 mg       500 mg      Oral      Q8H          â¢      sodium chloride (NS) flush 5-10 mL       5-10 mL      IntraVENous      Q8H          â¢      sodium chloride (NS) flush 5-10 mL       5-10 mL      IntraVENous      PRN          â¢      dicyclomine (BENTYL) tablet 20 mg       20 mg      Oral      Q6H PRN          â¢      ondansetron (ZOFRAN) injection 4 mg       4 mg      IntraVENous      Q4H PRN          â¢      acetaminophen (TYLENOL) tablet 650 mg       650 mg      Oral      Q4H PRN          â¢      HYDROcodone-acetaminophen (NORCO) 7.5-325 mg per tablet 1 Tab       1 Tab      Oral      Q4H PRN          â¢      HYDROmorphone (PF) (DILAUDID) injection 1 mg       1 mg      IntraVENous      Q4H PRN          â¢      naloxone (NARCAN) injection 0.4 mg       0.4 mg      IntraVENous      PRN          â¢      zolpidem (AMBIEN) tablet 5 mg       5 mg      Oral      QHS PRN          â¢      0.9% sodium chloride infusion       100 mL/hr      IntraVENous      CONTINUOUS          â¢      methylPREDNISolone (PF) (SOLU-MEDROL) injection 40 mg       40 mg      IntraVENous      Q8H          â¢      pantoprazole (PROTONIX) 40 mg in sodium chloride 0.9 % 10 mL injection       40 mg      IntraVENous      DAILY          â¢      promethazine (PHENERGAN) tablet 25 mg       25 mg      Oral      Q6H PRN          â¢      mesalamine (LIALDA) delayed release tablet 2,400 mg       2,400 mg      Oral      BID WITH MEALS          â¢      SE-Tan Plus MV Combo 1 Cap       1 Cap      Oral      DAILY                                               * Milestone

## 2017-01-17 NOTE — PROGRESS NOTES
Night was uneventful. BP was elevated some during the night but pt. States that this was shortly after using bathroom. BP has since decreased. Pt. Voiced no complaints. Respirations currently even and unlabored.

## 2017-01-17 NOTE — PROGRESS NOTES
To Whom It May Concern:    Mr. Paula Han has been under my care. Please excuse him from work from 1/12/17 until 1/25/17. If you have any questions, please feel free to contact my office at [phone].     Alton Olmedo NP       Nurse Practitioner  [facility]

## 2017-01-17 NOTE — PROGRESS NOTES
[address]      1/17/2017      RE: Joan Hedrick      To Whom it May Concern: This is to certify that Deonna Chung may return to work on 1-25-17. He has been hospitalized from 1-12-17 to 1-17-17      Please feel free to contact my office if you have any questions or concerns. Thank you for your assistance in this matter.     Sincerely,      Carlene Fields RN

## 2017-01-17 NOTE — PROGRESS NOTES
Patient requesting a Phenergan Prescription, work excuse and wanting his potasium checked next week. Explained to patient that potassium will be checked next week in the office and gave the patient a work excuse, and a prescription for Phenergan.

## 2017-01-17 NOTE — PROGRESS NOTES
Shift assessment completed. Patient alert and oriented x 4. NS infusing at 50 cc/hr. Family member in room with pt. Respirations even and unlabored. No acute distress noted. Bed low, locked, call bell within reach. Side rails up x 2.

## 2017-01-17 NOTE — DISCHARGE INSTRUCTIONS
DISCHARGE SUMMARY from Nurse    The following personal items are in your possession at time of discharge:    Dental Appliances: None  Visual Aid: Glasses, With patient     Home Medications: None  Jewelry: None  Clothing: Pants, Shirt, Footwear  Other Valuables: Cell Phone             PATIENT INSTRUCTIONS:    After general anesthesia or intravenous sedation, for 24 hours or while taking prescription Narcotics:  · Limit your activities  · Do not drive and operate hazardous machinery  · Do not make important personal or business decisions  · Do  not drink alcoholic beverages  · If you have not urinated within 8 hours after discharge, please contact your surgeon on call. Report the following to your surgeon:  · Excessive pain, swelling, redness or odor of or around the surgical area  · Temperature over 100.5  · Nausea and vomiting lasting longer than 4 hours or if unable to take medications  · Any signs of decreased circulation or nerve impairment to extremity: change in color, persistent  numbness, tingling, coldness or increase pain  · Any questions        What to do at Home:  Recommended activity: Activity as tolerated. GI Soft Diet    If you experience any of the following symptoms increased diarrhea or worsening symptoms, temp >101, please follow up with GI MD.      *  Please give a list of your current medications to your Primary Care Provider. *  Please update this list whenever your medications are discontinued, doses are      changed, or new medications (including over-the-counter products) are added. *  Please carry medication information at all times in case of emergency situations. These are general instructions for a healthy lifestyle:    No smoking/ No tobacco products/ Avoid exposure to second hand smoke    Surgeon General's Warning:  Quitting smoking now greatly reduces serious risk to your health.     Obesity, smoking, and sedentary lifestyle greatly increases your risk for illness    A healthy diet, regular physical exercise & weight monitoring are important for maintaining a healthy lifestyle    You may be retaining fluid if you have a history of heart failure or if you experience any of the following symptoms:  Weight gain of 3 pounds or more overnight or 5 pounds in a week, increased swelling in our hands or feet or shortness of breath while lying flat in bed. Please call your doctor as soon as you notice any of these symptoms; do not wait until your next office visit. Recognize signs and symptoms of STROKE:    F-face looks uneven    A-arms unable to move or move unevenly    S-speech slurred or non-existent    T-time-call 911 as soon as signs and symptoms begin-DO NOT go       Back to bed or wait to see if you get better-TIME IS BRAIN. Warning Signs of HEART ATTACK     Call 911 if you have these symptoms:   Chest discomfort. Most heart attacks involve discomfort in the center of the chest that lasts more than a few minutes, or that goes away and comes back. It can feel like uncomfortable pressure, squeezing, fullness, or pain.  Discomfort in other areas of the upper body. Symptoms can include pain or discomfort in one or both arms, the back, neck, jaw, or stomach.  Shortness of breath with or without chest discomfort.  Other signs may include breaking out in a cold sweat, nausea, or lightheadedness. Don't wait more than five minutes to call 911 - MINUTES MATTER! Fast action can save your life. Calling 911 is almost always the fastest way to get lifesaving treatment. Emergency Medical Services staff can begin treatment when they arrive -- up to an hour sooner than if someone gets to the hospital by car. The discharge information has been reviewed with the patient. The patient verbalized understanding. Discharge medications reviewed with the patient and appropriate educational materials and side effects teaching were provided.            Ulcerative Colitis: Care Instructions  Your Care Instructions    Ulcerative colitis is an inflammatory bowel disease (IBD). The large intestine (colon) gets inflamed and ulcers form in your colon. These ulcers can bleed. People have \"attacks\" of ulcerative colitis. Attacks can come and go. They can cause painful belly cramps and bloody diarrhea. This disease can affect part or all of the colon. How bad the disease gets will often depend on how much of the colon is affected. Bad attacks are often treated in a hospital. There you can get medicines, fluid, and nutrition through a tube in your vein, called an IV. This lets your digestive system rest and recover. If the medicines don't work well, surgery may be needed to remove the colon. At home, you can help control your ulcerative colitis. Take your medicines and try to eat well. And see your doctor as much as he or she recommends. Follow-up care is a key part of your treatment and safety. Be sure to make and go to all appointments, and call your doctor if you are having problems. It's also a good idea to know your test results and keep a list of the medicines you take. How can you care for yourself at home? · Be safe with medicines. Take your medicines exactly as prescribed. Call your doctor if you think you are having a problem with your medicine. You will get more details on the specific medicines your doctor prescribes. · Do not take anti-inflammatory medicines, such as aspirin, ibuprofen (Advil, Motrin), or naproxen (Aleve). They may make your symptoms worse. · Talk to your doctor before you take any other medicines or herbal products. · Eat healthy foods. Avoid foods that make your symptoms worse. These might include milk, alcohol, or spicy foods. · Make sure to get enough iron. Rectal bleeding may make you lose iron. Foods with a lot of iron include beef, lentils, spinach, and raisins. They also include iron-enriched breads and cereals.   · Take any nutrition supplements that your doctor prescribes. · This disease can affect all parts of your life. Get support from friends and family. You may also want to get some counseling. When should you call for help? Call 911 anytime you think you may need emergency care. For example, call if:  · You have severe belly pain. · You passed out (lost consciousness). Call your doctor now or seek immediate medical care if:  · You have signs of needing more fluids. You have sunken eyes and a dry mouth, and you pass only a little dark urine. · You have a fever or shaking chills. · Your belly is bloated. Watch closely for changes in your health, and be sure to contact your doctor if:  · Your symptoms get worse. · You pass new bloody stools, or the bloody stools are worse. · You have diarrhea for more than 2 weeks. · You have unexplained weight loss. Where can you learn more? Go to http://obinna-valentín.info/. Enter E242 in the search box to learn more about \"Ulcerative Colitis: Care Instructions. \"  Current as of: August 9, 2016  Content Version: 11.1  © 3012-4301 [employer]. Care instructions adapted under license by [employer] (which disclaims liability or warranty for this information). If you have questions about a medical condition or this instruction, always ask your healthcare professional. [place] [address] any warranty or liability for your use of this information. Clostridium Difficile Colitis: Care Instructions  Your Care Instructions  Clostridium difficile (also called C. difficile) are bacteria that can cause swelling and irritation of the large intestine, or colon. This inflammation is also called colitis. It can cause diarrhea, fever, and belly cramps. You may get C. difficile colitis if you take antibiotics.  The infection is most common in people who are taking antibiotics while in the hospital. It is also common in older people in hospitals and nursing homes. Severe disease could cause the colon to swell to many times its normal size (toxic megacolon). This can cause death and needs emergency treatment. You may have a swollen belly that is painful or tender, a rapid heartbeat, and a fever. Follow-up care is a key part of your treatment and safety. Be sure to make and go to all appointments, and call your doctor if you are having problems. It's also a good idea to know your test results and keep a list of the medicines you take. How can you care for yourself at home? · Your doctor may give you antibiotics to treat C. difficile colitis. If your doctor prescribes an antibiotic, he or she will give you a different antibiotic than the one that caused your infection. Take your antibiotics as directed. Do not stop taking them just because you feel better. You need to take the full course of antibiotics. · To prevent dehydration, drink plenty of fluids, enough so that your urine is light yellow or clear like water. Choose water and other caffeine-free clear liquids until you feel better. If you have kidney, heart, or liver disease and have to limit fluids, talk with your doctor before you increase the amount of fluids you drink. · Begin eating small amounts of mild foods, if you feel like it. Try yogurt that has live cultures of lactobacillus (check the label). ¨ Avoid spicy foods, fruits, alcohol, and caffeine until 48 hours after all symptoms go away. ¨ Avoid chewing gum that contains sorbitol. ¨ Avoid dairy products (except for yogurt with lactobacillus) while you have diarrhea and for 3 days after symptoms go away. · To prevent the spread of C. difficile, practice good hygiene. Keep your hands clean by washing them well and often with soap and clean, running water. Alcohol-based hand sanitizers do not kill C. difficile. When should you call for help? Call 911 if:  · You passed out (lost consciousness).   Call your doctor now or seek immediate medical care if:  · You have a fever over 101°F or shaking chills. · You feel lightheaded or have a fast heart rate. · You pass stools that are almost always bloody. · You have signs of needing more fluids. You have sunken eyes and a dry mouth, and you pass only a little dark urine. · You have severe belly pain with or without bloating. · You have severe vomiting and cannot keep down liquids. · You are not passing any stools or gas. Watch closely for changes in your health, and be sure to contact your doctor if:  · You do not get better as expected. Where can you learn more? Go to http://obinna-valentín.info/. Enter [phone] in the search box to learn more about \"Clostridium Difficile Colitis: Care Instructions. \"  Current as of: May 24, 2016  Content Version: 11.1  © 9419-8111 [employer]. Care instructions adapted under license by [employer] (which disclaims liability or warranty for this information). If you have questions about a medical condition or this instruction, always ask your healthcare professional. [address] any warranty or liability for your use of this information.

## 2017-01-17 NOTE — PROGRESS NOTES
Discharge instructions, follow up appointment, medication list, prescriptions (anusol, flagyl, prednisone, and Norco), and medication side effect sheet provided and explained to the pt. IV removed from right AC. Opportunity for questions provided. Patient requesting script for phenergan, states he does not have anymore at home. Call placed to MD for phenergan at patient's request.  States wife will be here to pick patient up around 5:00. Instructed to call once ready to leave.

## 2017-01-17 NOTE — PROGRESS NOTES
[address]      1/17/2017      RE: Jean Pierre Washburnefrem      To Whom it May Concern: This is to certify that Theone Cody may return to work on 1-. He has been hospitalized from 1-12-17 to 1-17-17. Please feel free to contact my office if you have any questions or concerns. Thank you for your assistance in this matter.     Sincerely,      Amirah Millard RN

## 2017-01-17 NOTE — DISCHARGE SUMMARY
[facility] Discharge Summary      Patient ID:  Peter Dodd  [id number]  40 y.o.  1970    Admit date:  1/12/2017    Discharge date and time:  1/17/2017     Primary GI Physician: Dr. Wei Gonzalez Physician:  Sammi Montalvo MD     Discharge Physician:Dr. Fernie Seymour    Admission Diagnoses:  Colitis, ulcerative chronic ([address]  Fever  Diarrhea  Rectal bleeding  Ulcerative colitis, chronic ([address]  Fever  Diarrhea  Rectal bleeding    Discharge Diagnoses:  Principal Problem:    Diarrhea (12/1/2016)    Active Problems:    Rectal bleeding (1/12/2017)      Colitis, ulcerative chronic ([address] (1/12/2017)      Fever (1/12/2017)      Ulcerative colitis, chronic ([address] (1/12/2017)    C. Diff    Consults:  None        Significant Diagnostic Studies:  Recent Labs      01/17/17   0555  01/16/17   0720  01/15/17   0653   WBC  12.7*  13.7*   --    HGB  8.4*  9.0*  9.0*   HCT  28.6*  30.7*  30.6*   PLT  551*  447   --    MCV  82.7  83.4   --    NA  144   --   146*   K  3.3*   --   3.7   CL  109*   --   111*   CO2  27   --   26   BUN  7   --   5*   CREA  0.65*   --   0.61*   CA  8.0*   --   8.6   GLU  110*   --   113*        Hospital Course:    40 y.o. male with PMHx of Chronic Ulcerative Colitis greater than 20 years well known to Dr. Neel Warren, who presented to ER for fever of 104 F, CT consistent with a flare-up of Colitis. Stools positive for C diff 1/12/17. He was started on Remicade therapy during a previous hospitalization on 12/9/16, and received the second dose on 12/22/16. He is due for 3rd dose on 1/19/17, which we are holding due to C.diff. He is currently on day 5 of Flagyl and this will continued for a total of 14 days. Frequency of stools has improved to 3-4 loose stools per day. Baseline stools are reported to be 2-3/day. He did have some BRBPR today which he attributes to hemorrhoids. He has had some rectal pain as well.  He was started on [facility] for 2 weeks and will continue this at discharge. He will also be discharged with prednisone taper starting at 40mg and decrease by 10mg per week. He will have labs checked in our office later this week to include CBC and BMP as his potassium was slightly low today at 3.3 but was replaced with 40 meq PO. His Remicade infusion was due 1/19/17 and has been cancelled due to C.diff. Dr. Jeff Hunt was made aware of this at discharge. He has a follow-up appointment with Dr. Jeff Hunt for 2/3/17 which he will keep. Of note, TPMT is pending at time of discharge. He was also provided a Rx for Noco 5/325 1 tab PO Q 6 hours #30 at discharge without any refills. He will return to the ED with fever, increase in blood in stools, or increasing abdominal pain. Objective:   Vitals:  Visit Vitals    /77    Pulse 67    Temp 98.1 °F (36.7 °C)    Resp 20    Ht 5' 9\" (1.753 m)    Wt 93.5 kg (206 lb 1.6 oz)    SpO2 97%    BMI 30.44 kg/m2     Intake/Output:     01/15 1901 - 01/17 0700  In: 360 [P.O.:360]  Out: 1100 [Urine:1100]  Exam:  General appearance: alert, cooperative, no distress  Lungs: clear to auscultation bilaterally anteriorly  Heart: regular rate and rhythm  Abdomen: soft, mildly TTP in LLQ. Bowel sounds normal. No masses, no organomegaly  Extremities: extremities normal, atraumatic, no cyanosis or edema  Neuro:  alert and oriented    Disposition:  Stable    Diet:  As tolerated    Activity:  As tolerated    Patient Instructions:   Current Discharge Medication List      START taking these medications    Details   hydrocortisone (ANUSOL-HC) 25 mg supp Insert 1 Suppository into rectum every twelve (12) hours. Qty: 14 Suppository, Refills: 0      metroNIDAZOLE (FLAGYL) 500 mg tablet Take 1 Tab by mouth every eight (8) hours for 9 days.  Indications: Clostridium difficile infection  Qty: 27 Tab, Refills: 0      !! predniSONE (DELTASONE) 10 mg tablet Take 4 tabs PO daily for 7 days, then 3 tabs PO for 7 days, then 2 tabs PO for 7 days, then 1 tab PO for 7 days. Then stop. Indications: ULCERATIVE COLITIS  Qty: 70 Tab, Refills: 0       !! - Potential duplicate medications found. Please discuss with provider. CONTINUE these medications which have NOT CHANGED    Details   ! ! predniSONE (DELTASONE) 20 mg tablet Take 40 mg by mouth daily (with breakfast). mesalamine (LIALDA) 1.2 gram delayed release tablet Take 2.4 g by mouth ACB/HS. ferrous sulfate (IRON) 325 mg (65 mg iron) tablet Take  by mouth daily. folic acid (FOLVITE) 1 mg tablet Take 1 mg by mouth daily. aspirin delayed-release 81 mg tablet Take 81 mg by mouth daily. HYDROcodone-acetaminophen (NORCO) 5-325 mg per tablet Take 1 Tab by mouth every six (6) hours as needed for Pain. INFLIXIMAB (REMICADE IV) by IntraVENous route. promethazine (PHENERGAN) 25 mg tablet Take 1 Tab by mouth every eight (8) hours as needed. Qty: 15 Tab, Refills: 0       !! - Potential duplicate medications found. Please discuss with provider. Follow up: Follow-up Appointments   Procedures    FOLLOW UP VISIT Appointment in: Two Weeks Follow up in our office as previously scheduled with Dr. Juan Manuel Edge on February 3 at 8:30 at [facility]. Remicade infusion for 1/19/17 has been cancelled. Dr. Juan Manuel Edge will decide when next Remicade infusion i... Follow up in our office as previously scheduled with Dr. Juan Manuel Edge on February 3 at 8:30 at [facility]. Remicade infusion for 1/19/17 has been cancelled. Dr. Juan Manuel Edge will decide when next Remicade infusion is due. Call with increase in diarrhea or worsening symptoms or with Temp greater than 101 F. Standing Status:   Standing     Number of Occurrences:   1     Order Specific Question:   Appointment in     Answer: Two Weeks       Total time discharging patient took greater than 30 minutes.     Signed:  Damián Yang NP  1/17/2017  3:11 PM

## 2017-01-17 NOTE — PROGRESS NOTES
GI DAILY PROGRESS NOTE    Admit Date:  1/12/2017    Today's Date:  1/17/2017    CC:  UC/ C.diff    Subjective:     Patient reports decrease in frequency of stools to 4-5 yesterday. Baseline bowel pattern is 2-3. Has had rectal bleeding which he relates to his hemorrhoids. Has been using his home Proctosol cream in the hospital for this. Stools are still loose. Denies any abdominal pain, nausea, or vomiting. Tolerating GI soft diet. Labs reviewed with patient. Medications:   Current Facility-Administered Medications   Medication Dose Route Frequency    potassium chloride (K-DUR, KLOR-CON) SR tablet 20 mEq  20 mEq Oral NOW    hydrocortisone (ANUSOL-HC) suppository 25 mg  25 mg Rectal [id number]    folic acid (FOLVITE) tablet 1 mg  1 mg Oral DAILY    metroNIDAZOLE (FLAGYL) tablet 500 mg  500 mg Oral Q8H    sodium chloride (NS) flush 5-10 mL  5-10 mL IntraVENous Q8H    sodium chloride (NS) flush 5-10 mL  5-10 mL IntraVENous PRN    dicyclomine (BENTYL) tablet 20 mg  20 mg Oral Q6H PRN    ondansetron (ZOFRAN) injection 4 mg  4 mg IntraVENous Q4H PRN    acetaminophen (TYLENOL) tablet 650 mg  650 mg Oral Q4H PRN    HYDROcodone-acetaminophen (NORCO) 7.5-325 mg per tablet 1 Tab  1 Tab Oral Q4H PRN    HYDROmorphone (PF) (DILAUDID) injection 1 mg  1 mg IntraVENous Q4H PRN    naloxone (NARCAN) injection 0.4 mg  0.4 mg IntraVENous PRN    zolpidem (AMBIEN) tablet 5 mg  5 mg Oral QHS PRN    0.9% sodium chloride infusion  50 mL/hr IntraVENous CONTINUOUS    methylPREDNISolone (PF) (SOLU-MEDROL) injection 40 mg  40 mg IntraVENous Q8H    pantoprazole (PROTONIX) 40 mg in sodium chloride 0.9 % 10 mL injection  40 mg IntraVENous DAILY    promethazine (PHENERGAN) tablet 25 mg  25 mg Oral Q6H PRN    mesalamine (LIALDA) delayed release tablet 2,400 mg  2,400 mg Oral BID WITH MEALS    SE-Tan Plus MV Combo 1 Cap  1 Cap Oral DAILY       Review of Systems:  ROS was obtained, with pertinent positives as listed above.   No chest pain or SOB. Diet:  GI soft diet    Objective:   Vitals:  Visit Vitals    /81    Pulse 80    Temp 98.1 °F (36.7 °C)    Resp 20    Ht 5' 9\" (1.753 m)    Wt 93.5 kg (206 lb 1.6 oz)    SpO2 97%    BMI 30.44 kg/m2     Intake/Output:     01/15 1901 - 01/17 0700  In: 360 [P.O.:360]  Out: 1100 [Urine:1100]  Exam:  General appearance: alert, cooperative, no distress  Lungs: clear to auscultation bilaterally anteriorly  Heart: regular rate and rhythm  Abdomen: soft, non-tender. Bowel sounds normal. No masses, no organomegaly  Extremities: extremities normal, atraumatic, no cyanosis or edema  Neuro:  alert and oriented    Data Review (Labs):    Recent Labs      01/17/17   0555  01/16/17   0720  01/15/17   0653   WBC  12.7*  13.7*   --    HGB  8.4*  9.0*  9.0*   HCT  28.6*  30.7*  30.6*   PLT  551*  447   --    MCV  82.7  83.4   --    NA  144   --   146*   K  3.3*   --   3.7   CL  109*   --   111*   CO2  27   --   26   BUN  7   --   5*   CREA  0.65*   --   0.61*   CA  8.0*   --   8.6   GLU  110*   --   113*       Assessment:     Principal Problem:    Diarrhea (12/1/2016)    Active Problems:    Rectal bleeding (1/12/2017)      Colitis, ulcerative chronic ([facility] (1/12/2017)      Fever (1/12/2017)      Ulcerative colitis, chronic ([facility] (1/12/2017)       40 y.o. male with PMHx of Chronic Ulcerative Colitis > 20 years well known to Dr. Neel Warren, who presented to ER for fever of 104 F, CT consistent with a flare-up of Colitis. Stools positive for C diff. He was started on Remicade therapy during last hospitalization on 12/9/16, and received the second dose on 12/22/16. He is due for 3rd dose on 1/19/17. He has failed outpatient treatment with Prednisone, with dose recently increased to 40 mg after initial taper to 10 mg, from a discharge dose of 40 mg daily. WBC elevation likely secondary to steroids but improving. HGB Drifted down to 8.4 this morning. C/O bleeding hemorrhoids and rectal pain. Plan:   1. Continue po Flagyl day #5 of 14 for c difficile. Continue IV solumedrol, & IVF. 2. GI soft diet  3. May need to postpone Remicade infusion scheduled for 1/19/17. 4. TPMT pending per lab  5. Follow hgb; is on oral iron supplementation  6. IVF: Stop today due to adequate PO intake and decreased Stool Output  7. Replace K today. 8. Possible discharge home later today or tomorrow with close follow-up. Dr. Noa Cleveland to follow with further recommendations. Inocencio Webster NP    Patient is seen and examined in collaboration with Dr. Noa Cleveland. Assessment and plan as per Dr. Noa Cleveland. GI attending/  Improved with less diarrhea, although still some blood in stool. Abdomen is minimally tender in the LLQ. OK to go home with outpatient F/U. Complete course of Flagyl for C diff and postpone Remicade infusion.   Roma Dakin, MD

## 2017-01-18 ENCOUNTER — HOSPITAL ENCOUNTER (INPATIENT)
Age: 47
LOS: 19 days | Discharge: HOME | DRG: 372 | End: 2017-02-06
Attending: EMERGENCY MEDICINE | Admitting: INTERNAL MEDICINE
Payer: COMMERCIAL

## 2017-01-18 ENCOUNTER — APPOINTMENT (OUTPATIENT)
Dept: GENERAL RADIOLOGY | Age: 47
DRG: 372 | End: 2017-01-18
Attending: EMERGENCY MEDICINE
Payer: COMMERCIAL

## 2017-01-18 DIAGNOSIS — K51.911 ULCERATIVE COLITIS WITH RECTAL BLEEDING, UNSPECIFIED LOCATION (HCC): Primary | ICD-10-CM

## 2017-01-18 DIAGNOSIS — A04.72 CLOSTRIDIUM DIFFICILE COLITIS: ICD-10-CM

## 2017-01-18 DIAGNOSIS — D62 ANEMIA DUE TO ACUTE BLOOD LOSS: ICD-10-CM

## 2017-01-18 PROBLEM — K51.90 ULCERATIVE COLITIS (HCC): Status: ACTIVE | Noted: 2017-01-18

## 2017-01-18 LAB
ALBUMIN SERPL BCP-MCNC: 2 G/DL (ref 3.5–5)
ALBUMIN/GLOB SERPL: 0.6 {RATIO} (ref 1.2–3.5)
ALP SERPL-CCNC: 52 U/L (ref 50–136)
ALT SERPL-CCNC: 9 U/L (ref 12–65)
ANION GAP BLD CALC-SCNC: 10 MMOL/L (ref 7–16)
AST SERPL W P-5'-P-CCNC: 7 U/L (ref 15–37)
ATRIAL RATE: 104 BPM
BASOPHILS # BLD AUTO: 0 K/UL (ref 0–0.2)
BASOPHILS # BLD: 0 % (ref 0–2)
BILIRUB SERPL-MCNC: 0.3 MG/DL (ref 0.2–1.1)
BUN SERPL-MCNC: 9 MG/DL (ref 6–23)
CALCIUM SERPL-MCNC: 7.6 MG/DL (ref 8.3–10.4)
CALCULATED P AXIS, ECG09: 41 DEGREES
CALCULATED R AXIS, ECG10: 58 DEGREES
CALCULATED T AXIS, ECG11: 2 DEGREES
CHLORIDE SERPL-SCNC: 109 MMOL/L (ref 98–107)
CO2 SERPL-SCNC: 25 MMOL/L (ref 21–32)
CREAT SERPL-MCNC: 1.14 MG/DL (ref 0.8–1.5)
CRP SERPL-MCNC: 12 MG/DL (ref 0–0.9)
DIAGNOSIS, 93000: NORMAL
DIASTOLIC BP, ECG02: NORMAL MMHG
DIFFERENTIAL METHOD BLD: ABNORMAL
EOSINOPHIL # BLD: 0 K/UL (ref 0–0.8)
EOSINOPHIL NFR BLD: 0 % (ref 0.5–7.8)
ERYTHROCYTE [DISTWIDTH] IN BLOOD BY AUTOMATED COUNT: 19.4 % (ref 11.9–14.6)
FLUAV AG NPH QL IA: NEGATIVE
FLUBV AG NPH QL IA: NEGATIVE
GLOBULIN SER CALC-MCNC: 3.4 G/DL (ref 2.3–3.5)
GLUCOSE SERPL-MCNC: 69 MG/DL (ref 65–100)
HCT VFR BLD AUTO: 21.5 % (ref 41.1–50.3)
HCT VFR BLD AUTO: 23.8 % (ref 41.1–50.3)
HGB BLD-MCNC: 6.5 G/DL (ref 13.6–17.2)
HGB BLD-MCNC: 7 G/DL (ref 13.6–17.2)
IMM GRANULOCYTES # BLD: 0.2 K/UL (ref 0–0.5)
IMM GRANULOCYTES NFR BLD AUTO: 0.6 % (ref 0–5)
INR PPP: 1.2 (ref 0.9–1.2)
LACTATE BLD-SCNC: 2.7 MMOL/L (ref 0.5–1.9)
LACTATE SERPL-SCNC: 2 MMOL/L (ref 0.4–2)
LYMPHOCYTES # BLD AUTO: 9 % (ref 13–44)
LYMPHOCYTES # BLD: 2.5 K/UL (ref 0.5–4.6)
MCH RBC QN AUTO: 24.4 PG (ref 26.1–32.9)
MCHC RBC AUTO-ENTMCNC: 29.4 G/DL (ref 31.4–35)
MCV RBC AUTO: 82.9 FL (ref 79.6–97.8)
MONOCYTES # BLD: 1.9 K/UL (ref 0.1–1.3)
MONOCYTES NFR BLD AUTO: 7 % (ref 4–12)
NEUTS SEG # BLD: 22.2 K/UL (ref 1.7–8.2)
NEUTS SEG NFR BLD AUTO: 83 % (ref 43–78)
P-R INTERVAL, ECG05: 166 MS
PLATELET # BLD AUTO: 539 K/UL (ref 150–450)
PMV BLD AUTO: 9 FL (ref 10.8–14.1)
POTASSIUM SERPL-SCNC: 3 MMOL/L (ref 3.5–5.1)
PROCALCITONIN SERPL-MCNC: 0.8 NG/ML
PROT SERPL-MCNC: 5.4 G/DL (ref 6.3–8.2)
PROTHROMBIN TIME: 13.6 SEC (ref 9.6–12)
Q-T INTERVAL, ECG07: 330 MS
QRS DURATION, ECG06: 88 MS
QTC CALCULATION (BEZET), ECG08: 433 MS
RBC # BLD AUTO: 2.87 M/UL (ref 4.23–5.67)
SODIUM SERPL-SCNC: 144 MMOL/L (ref 136–145)
SYSTOLIC BP, ECG01: NORMAL MMHG
VENTRICULAR RATE, ECG03: 104 BPM
WBC # BLD AUTO: 26.8 K/UL (ref 4.3–11.1)

## 2017-01-18 PROCEDURE — 85025 COMPLETE CBC W/AUTO DIFF WBC: CPT | Performed by: EMERGENCY MEDICINE

## 2017-01-18 PROCEDURE — 74011250636 HC RX REV CODE- 250/636: Performed by: NURSE PRACTITIONER

## 2017-01-18 PROCEDURE — 65270000029 HC RM PRIVATE

## 2017-01-18 PROCEDURE — 36430 TRANSFUSION BLD/BLD COMPNT: CPT

## 2017-01-18 PROCEDURE — P9016 RBC LEUKOCYTES REDUCED: HCPCS | Performed by: EMERGENCY MEDICINE

## 2017-01-18 PROCEDURE — 74022 RADEX COMPL AQT ABD SERIES: CPT

## 2017-01-18 PROCEDURE — 74011250637 HC RX REV CODE- 250/637: Performed by: EMERGENCY MEDICINE

## 2017-01-18 PROCEDURE — 87086 URINE CULTURE/COLONY COUNT: CPT | Performed by: INTERNAL MEDICINE

## 2017-01-18 PROCEDURE — 36415 COLL VENOUS BLD VENIPUNCTURE: CPT | Performed by: INTERNAL MEDICINE

## 2017-01-18 PROCEDURE — 86900 BLOOD TYPING SEROLOGIC ABO: CPT | Performed by: EMERGENCY MEDICINE

## 2017-01-18 PROCEDURE — 87804 INFLUENZA ASSAY W/OPTIC: CPT | Performed by: EMERGENCY MEDICINE

## 2017-01-18 PROCEDURE — 96374 THER/PROPH/DIAG INJ IV PUSH: CPT | Performed by: EMERGENCY MEDICINE

## 2017-01-18 PROCEDURE — 87040 BLOOD CULTURE FOR BACTERIA: CPT | Performed by: EMERGENCY MEDICINE

## 2017-01-18 PROCEDURE — 74011000258 HC RX REV CODE- 258: Performed by: INTERNAL MEDICINE

## 2017-01-18 PROCEDURE — 83605 ASSAY OF LACTIC ACID: CPT | Performed by: INTERNAL MEDICINE

## 2017-01-18 PROCEDURE — 85018 HEMOGLOBIN: CPT | Performed by: INTERNAL MEDICINE

## 2017-01-18 PROCEDURE — 77030013131 HC IV BLD ST ICUM -A

## 2017-01-18 PROCEDURE — 99285 EMERGENCY DEPT VISIT HI MDM: CPT | Performed by: EMERGENCY MEDICINE

## 2017-01-18 PROCEDURE — 85610 PROTHROMBIN TIME: CPT | Performed by: INTERNAL MEDICINE

## 2017-01-18 PROCEDURE — 80053 COMPREHEN METABOLIC PANEL: CPT | Performed by: EMERGENCY MEDICINE

## 2017-01-18 PROCEDURE — 77030032490 HC SLV COMPR SCD KNE COVD -B

## 2017-01-18 PROCEDURE — 74011250636 HC RX REV CODE- 250/636: Performed by: INTERNAL MEDICINE

## 2017-01-18 PROCEDURE — 93005 ELECTROCARDIOGRAM TRACING: CPT | Performed by: EMERGENCY MEDICINE

## 2017-01-18 PROCEDURE — 86923 COMPATIBILITY TEST ELECTRIC: CPT | Performed by: EMERGENCY MEDICINE

## 2017-01-18 PROCEDURE — 74011250636 HC RX REV CODE- 250/636: Performed by: EMERGENCY MEDICINE

## 2017-01-18 PROCEDURE — 96361 HYDRATE IV INFUSION ADD-ON: CPT | Performed by: EMERGENCY MEDICINE

## 2017-01-18 PROCEDURE — 86140 C-REACTIVE PROTEIN: CPT | Performed by: INTERNAL MEDICINE

## 2017-01-18 PROCEDURE — 74011250637 HC RX REV CODE- 250/637: Performed by: NURSE PRACTITIONER

## 2017-01-18 PROCEDURE — 84145 PROCALCITONIN (PCT): CPT | Performed by: EMERGENCY MEDICINE

## 2017-01-18 PROCEDURE — 83605 ASSAY OF LACTIC ACID: CPT

## 2017-01-18 RX ORDER — ONDANSETRON 4 MG/1
4 TABLET, FILM COATED ORAL
COMMUNITY
End: 2018-07-17

## 2017-01-18 RX ORDER — PANTOPRAZOLE SODIUM 40 MG/1
40 TABLET, DELAYED RELEASE ORAL DAILY
COMMUNITY
End: 2018-07-17

## 2017-01-18 RX ORDER — ONDANSETRON 2 MG/ML
4 INJECTION INTRAMUSCULAR; INTRAVENOUS
Status: DISCONTINUED | OUTPATIENT
Start: 2017-01-18 | End: 2017-02-07 | Stop reason: HOSPADM

## 2017-01-18 RX ORDER — HYDROCORTISONE ACETATE 25 MG/1
25 SUPPOSITORY RECTAL EVERY 12 HOURS
Status: DISCONTINUED | OUTPATIENT
Start: 2017-01-18 | End: 2017-01-18

## 2017-01-18 RX ORDER — HYDROCORTISONE SODIUM SUCCINATE 100 MG/2ML
50 INJECTION, POWDER, FOR SOLUTION INTRAMUSCULAR; INTRAVENOUS
Status: COMPLETED | OUTPATIENT
Start: 2017-01-18 | End: 2017-01-18

## 2017-01-18 RX ORDER — ACETAMINOPHEN 325 MG/1
650 TABLET ORAL
Status: DISCONTINUED | OUTPATIENT
Start: 2017-01-18 | End: 2017-01-31

## 2017-01-18 RX ORDER — SODIUM CHLORIDE 0.9 % (FLUSH) 0.9 %
5-10 SYRINGE (ML) INJECTION EVERY 8 HOURS
Status: DISCONTINUED | OUTPATIENT
Start: 2017-01-18 | End: 2017-02-07 | Stop reason: HOSPADM

## 2017-01-18 RX ORDER — LANOLIN ALCOHOL/MO/W.PET/CERES
1 CREAM (GRAM) TOPICAL
Status: DISCONTINUED | OUTPATIENT
Start: 2017-01-19 | End: 2017-01-18

## 2017-01-18 RX ORDER — FOLIC ACID 1 MG/1
1 TABLET ORAL DAILY
Status: DISCONTINUED | OUTPATIENT
Start: 2017-01-19 | End: 2017-02-07 | Stop reason: HOSPADM

## 2017-01-18 RX ORDER — PROMETHAZINE HYDROCHLORIDE 25 MG/1
12.5 TABLET ORAL
Status: DISCONTINUED | OUTPATIENT
Start: 2017-01-18 | End: 2017-02-07 | Stop reason: HOSPADM

## 2017-01-18 RX ORDER — DICYCLOMINE HYDROCHLORIDE 10 MG/1
10 CAPSULE ORAL 4 TIMES DAILY
COMMUNITY
End: 2018-07-17

## 2017-01-18 RX ORDER — POTASSIUM CHLORIDE 20MEQ/15ML
40 LIQUID (ML) ORAL
Status: COMPLETED | OUTPATIENT
Start: 2017-01-18 | End: 2017-01-18

## 2017-01-18 RX ORDER — SODIUM CHLORIDE AND POTASSIUM CHLORIDE .9; .15 G/100ML; G/100ML
SOLUTION INTRAVENOUS CONTINUOUS
Status: DISCONTINUED | OUTPATIENT
Start: 2017-01-18 | End: 2017-02-07 | Stop reason: HOSPADM

## 2017-01-18 RX ORDER — METRONIDAZOLE 500 MG/1
500 TABLET ORAL EVERY 8 HOURS
Status: DISCONTINUED | OUTPATIENT
Start: 2017-01-18 | End: 2017-01-19

## 2017-01-18 RX ORDER — FENTANYL CITRATE 50 UG/ML
75 INJECTION, SOLUTION INTRAMUSCULAR; INTRAVENOUS ONCE
Status: COMPLETED | OUTPATIENT
Start: 2017-01-18 | End: 2017-01-18

## 2017-01-18 RX ORDER — ASPIRIN 81 MG/1
81 TABLET ORAL DAILY
Status: DISCONTINUED | OUTPATIENT
Start: 2017-01-19 | End: 2017-01-19

## 2017-01-18 RX ORDER — SODIUM CHLORIDE 9 MG/ML
250 INJECTION, SOLUTION INTRAVENOUS AS NEEDED
Status: DISCONTINUED | OUTPATIENT
Start: 2017-01-18 | End: 2017-01-19

## 2017-01-18 RX ORDER — HYDROMORPHONE HYDROCHLORIDE 1 MG/ML
1 INJECTION, SOLUTION INTRAMUSCULAR; INTRAVENOUS; SUBCUTANEOUS
Status: DISCONTINUED | OUTPATIENT
Start: 2017-01-18 | End: 2017-01-29

## 2017-01-18 RX ORDER — TRAMADOL HYDROCHLORIDE 50 MG/1
50 TABLET ORAL
COMMUNITY
End: 2018-07-17

## 2017-01-18 RX ORDER — ZOLPIDEM TARTRATE 5 MG/1
5 TABLET ORAL
Status: DISCONTINUED | OUTPATIENT
Start: 2017-01-18 | End: 2017-02-07 | Stop reason: HOSPADM

## 2017-01-18 RX ORDER — SODIUM CHLORIDE 0.9 % (FLUSH) 0.9 %
5-10 SYRINGE (ML) INJECTION AS NEEDED
Status: DISCONTINUED | OUTPATIENT
Start: 2017-01-18 | End: 2017-02-07 | Stop reason: HOSPADM

## 2017-01-18 RX ORDER — HYDROCODONE BITARTRATE AND ACETAMINOPHEN 5; 325 MG/1; MG/1
1 TABLET ORAL
Status: DISCONTINUED | OUTPATIENT
Start: 2017-01-18 | End: 2017-01-18

## 2017-01-18 RX ADMIN — HYDROMORPHONE HYDROCHLORIDE 1 MG: 1 INJECTION, SOLUTION INTRAMUSCULAR; INTRAVENOUS; SUBCUTANEOUS at 20:35

## 2017-01-18 RX ADMIN — SODIUM CHLORIDE 1000 ML: 900 INJECTION, SOLUTION INTRAVENOUS at 09:45

## 2017-01-18 RX ADMIN — VANCOMYCIN HYDROCHLORIDE 500 MG: 1 INJECTION, POWDER, LYOPHILIZED, FOR SOLUTION INTRAVENOUS at 10:50

## 2017-01-18 RX ADMIN — HYDROCORTISONE ACETATE 25 MG: 25 SUPPOSITORY RECTAL at 18:56

## 2017-01-18 RX ADMIN — METRONIDAZOLE 500 MG: 500 TABLET ORAL at 14:58

## 2017-01-18 RX ADMIN — Medication 10 ML: at 21:43

## 2017-01-18 RX ADMIN — METRONIDAZOLE 500 MG: 500 TABLET ORAL at 21:45

## 2017-01-18 RX ADMIN — HYDROCORTISONE SODIUM SUCCINATE 50 MG: 100 INJECTION, POWDER, FOR SOLUTION INTRAMUSCULAR; INTRAVENOUS at 12:40

## 2017-01-18 RX ADMIN — FENTANYL CITRATE 75 MCG: 50 INJECTION, SOLUTION INTRAMUSCULAR; INTRAVENOUS at 10:35

## 2017-01-18 RX ADMIN — SODIUM CHLORIDE 1000 ML: 900 INJECTION, SOLUTION INTRAVENOUS at 08:50

## 2017-01-18 RX ADMIN — ACETAMINOPHEN 650 MG: 325 TABLET, FILM COATED ORAL at 15:53

## 2017-01-18 RX ADMIN — SODIUM CHLORIDE AND POTASSIUM CHLORIDE: 9; 1.49 INJECTION, SOLUTION INTRAVENOUS at 14:58

## 2017-01-18 RX ADMIN — PROMETHAZINE HYDROCHLORIDE 12.5 MG: 25 TABLET ORAL at 20:47

## 2017-01-18 RX ADMIN — HYDROMORPHONE HYDROCHLORIDE 1 MG: 1 INJECTION, SOLUTION INTRAMUSCULAR; INTRAVENOUS; SUBCUTANEOUS at 12:40

## 2017-01-18 RX ADMIN — POTASSIUM CHLORIDE 40 MEQ: 1.5 SOLUTION ORAL at 12:40

## 2017-01-18 RX ADMIN — Medication 5 ML: at 14:58

## 2017-01-18 RX ADMIN — PIPERACILLIN SODIUM,TAZOBACTAM SODIUM 3.38 G: 3; .375 INJECTION, POWDER, FOR SOLUTION INTRAVENOUS at 14:58

## 2017-01-18 RX ADMIN — METHYLPREDNISOLONE SODIUM SUCCINATE 40 MG: 40 INJECTION, POWDER, FOR SOLUTION INTRAMUSCULAR; INTRAVENOUS at 20:35

## 2017-01-18 RX ADMIN — SODIUM CHLORIDE 250 ML: 900 INJECTION, SOLUTION INTRAVENOUS at 15:54

## 2017-01-18 NOTE — LETTER
[address] [facility] EMERGENCY DEPT One [address] [facility] [place] [address] 
[phone] Work/School Note Date: 1/18/2017 To Whom It May concern: Kenneth Rueda was seen and treated today in the emergency room by the following provider(s): 
Attending Provider: Kodi Brasher MD. Kenneth Rueda was accompanied by his wife.   
 
Sincerely, 
 
 
 
 
Rosa Romero RN

## 2017-01-18 NOTE — H&P
[facility] H&P (Admission)        Date:  1/18/2017    Primary GI Physician:  Dr. Burke Christianson MD: Dr. Radha Whitehead    Chief Complaint:  ABLA, Diarrhea, Abdominal pain, UC, C.diff    Subjective:     History of Present Illness:  Patient is a 40 y.o. male with PMH of Ulcerative Colitis, C.diff, who is being admitted for drop in HGB to 7.0 with hypotension and bloody stools. Mr. Henry Courser was admitted from 1/12/17 until 1/17/17(yesterday) with C.diff and Ulcerative Colitis. He was doing well yesterday with fewer stools and no fever with no abdominal pain. He was discharged home. Then overnight, he had return of lower abdominal pain with increase in blood per rectum and began feeling \"faint\". He fell at home and his wife was concerned and checked his BP which was in the 26'R systolic and his temp was 39C. She called 911. On arrival, he was hypotensive with BP 91/54, Temp 37.1C. He has received NS bolus. BP has improved to 109/57. HGB is 7.0 which is a drop from 8.4 yesterday. He has agreed to transfusion. WBC is up to 26.8 from 12.7. He reports lower abdominal cramping pain. Reports increase in number of stools overnight with increasing amount of blood in stool. Potassium is 3.0 on arrival and he has been replaced orally. He will be admitted for further evaluation and treatment. PMH:  Past Medical History   Diagnosis Date    Ulcerative colitis ([facility]        PSH:  Past Surgical History   Procedure Laterality Date    Flexible sigmoidoscopy N/A 12/5/2016     SIGMOIDOSCOPY FLEXIBLE performed by Kannan Morel MD at [facility] ENDOSCOPY       Allergies: Allergies   Allergen Reactions    Ibuprofen Other (comments)     Colitis      Sulfa (Sulfonamide Antibiotics) Rash       Home Medications:  Prior to Admission medications    Medication Sig Start Date End Date Taking? Authorizing Provider   hydrocortisone (ANUSOL-HC) 25 mg supp Insert 1 Suppository into rectum every twelve (12) hours.  1/17/17 Carlee Cheung MD   metroNIDAZOLE (FLAGYL) 500 mg tablet Take 1 Tab by mouth every eight (8) hours for 9 days. Indications: Clostridium difficile infection 1/17/17 1/26/17  Carlee Cheung MD   predniSONE (DELTASONE) 10 mg tablet Take 4 tabs PO daily for 7 days, then 3 tabs PO for 7 days, then 2 tabs PO for 7 days, then 1 tab PO for 7 days. Then stop. Indications: ULCERATIVE COLITIS 1/17/17   Carlee Cheung MD   predniSONE (DELTASONE) 20 mg tablet Take 40 mg by mouth daily (with breakfast). Historical Provider   mesalamine (LIALDA) 1.2 gram delayed release tablet Take 2.4 g by mouth ACB/HS. Historical Provider   ferrous sulfate (IRON) 325 mg (65 mg iron) tablet Take  by mouth daily. Historical Provider   folic acid (FOLVITE) 1 mg tablet Take 1 mg by mouth daily. Historical Provider   aspirin delayed-release 81 mg tablet Take 81 mg by mouth daily. Historical Provider   HYDROcodone-acetaminophen (NORCO) 5-325 mg per tablet Take 1 Tab by mouth every six (6) hours as needed for Pain. Historical Provider   INFLIXIMAB (REMICADE IV) by IntraVENous route. Historical Provider   promethazine (PHENERGAN) 25 mg tablet Take 1 Tab by mouth every eight (8) hours as needed. 12/13/16   ROSALINDA Hawkins       [facility] Medications:  Current Facility-Administered Medications   Medication Dose Route Frequency    0.9% sodium chloride infusion 250 mL  250 mL IntraVENous PRN    potassium chloride (KAON 10%) 20 mEq/15 mL oral liquid 40 mEq  40 mEq Oral NOW     Current Outpatient Prescriptions   Medication Sig    hydrocortisone (ANUSOL-HC) 25 mg supp Insert 1 Suppository into rectum every twelve (12) hours.  metroNIDAZOLE (FLAGYL) 500 mg tablet Take 1 Tab by mouth every eight (8) hours for 9 days. Indications: Clostridium difficile infection    predniSONE (DELTASONE) 10 mg tablet Take 4 tabs PO daily for 7 days, then 3 tabs PO for 7 days, then 2 tabs PO for 7 days, then 1 tab PO for 7 days. Then stop. Indications: ULCERATIVE COLITIS    predniSONE (DELTASONE) 20 mg tablet Take 40 mg by mouth daily (with breakfast).  mesalamine (LIALDA) 1.2 gram delayed release tablet Take 2.4 g by mouth ACB/HS.  ferrous sulfate (IRON) 325 mg (65 mg iron) tablet Take  by mouth daily.  folic acid (FOLVITE) 1 mg tablet Take 1 mg by mouth daily.  aspirin delayed-release 81 mg tablet Take 81 mg by mouth daily.  HYDROcodone-acetaminophen (NORCO) 5-325 mg per tablet Take 1 Tab by mouth every six (6) hours as needed for Pain.  INFLIXIMAB (REMICADE IV) by IntraVENous route.  promethazine (PHENERGAN) 25 mg tablet Take 1 Tab by mouth every eight (8) hours as needed. Social History:  Social History   Substance Use Topics    Smoking status: Never Smoker    Smokeless tobacco: Not on file    Alcohol use Yes         Family History:  No family history on file. Review of Systems:  A detailed 10 system ROS is obtained, with pertinent positives as listed above. All others are negative. Objective:     Physical Exam:  Vitals:  Visit Vitals    /57    Pulse 93    Temp 98.8 °F (37.1 °C)    Resp 14    Ht 5' 9\" (1.753 m)    Wt 93.4 kg (206 lb)    SpO2 96%    BMI 30.42 kg/m2     Gen:  Pt is alert, cooperative, pale  Skin:  Extremities and face reveal no rashes. HEENT: Sclerae anicteric. Extra-occular muscles are intact. No oral ulcers. No abnormal pigmentation of the lips. The neck is supple. Cardiovascular: Regular rate and rhythm. No murmurs, gallops, or rubs. Respiratory:  Comfortable breathing with no accessory muscle use. Clear breath sounds with no wheezes, rales, or rhonchi. GI:  Abdomen nondistended, soft, and TTP in bilateral lower quadrants. Normal active bowel sounds. No enlargement of the liver or spleen. No masses palpable. Rectal:  Deferred  Musculoskeletal:  No pitting edema of the lower legs. Neurological:  Gross memory appears intact.   Patient is alert and oriented. Psychiatric:  Mood appears appropriate with judgement intact. Lymphatic:  No cervical or supraclavicular adenopathy. Laboratory:    Recent Labs      01/18/17   1007  01/17/17   0555  01/16/17   0720   WBC  26.8*  12.7*  13.7*   HGB  7.0*  8.4*  9.0*   HCT  23.8*  28.6*  30.7*   PLT  539*  551*  447   MCV  82.9  82.7  83.4   NA  144  144   --    K  3.0*  3.3*   --    CL  109*  109*   --    CO2  25  27   --    BUN  9  7   --    CREA  1.14  0.65*   --    CA  7.6*  8.0*   --    GLU  69  110*   --    AP  52   --    --    SGOT  7*   --    --    ALT  9*   --    --    TBILI  0.3   --    --    ALB  2.0*   --    --    TP  5.4*   --    --        CT abd/pelvis with contrast 1/12/17  IMPRESSION  IMPRESSION: Inflammation of the distal colon consistent with ulcerative colitis. Assessment:       Active Problems:    Rectal bleed (12/1/2016)      Abdominal pain (12/1/2016)      Fever (1/12/2017)      Acute blood loss anemia (1/18/2017)      Ulcerative colitis ([address] (1/18/2017)      Clostridium difficile diarrhea (1/18/2017)      41 yo male who is being admitted due to hypotension, increased diarrhea with C.diff and UC, ABLA with drop in HGB from 8.4 to 7.0 overnight. He was started on Remicade therapy during a previous hospitalization on 12/9/16, and received the second dose on 12/22/16. He is due for 3rd dose on 1/19/17, which we are holding due to C.diff. Our office is aware. Plan:       1. Admit for further evaluation and treatment. Expect greater than 2 nights. 2. Monitor HGB and transfuse PRN. 3. Transfuse 2 units of PRBC now  4. IVF: NS with 20Kcl at 75ml/hour  5. Resume Flagyl 500mg PO TID (day 6). 6. Solumedrol IV  7. Resume home meds  8. Antiemetics- Zofran and Phenergan PRN  9. Pain cotrol- Dilaudid and Norco PRN  10. Clear Liquid Diet  11. VTE Proph: SCD. 12. Urine Culture  13. Blood Culture Pending  14. Await Abdominal Xray  15. Daily labs  16.  Zosyn IV for broad spectrum coverage until cultures return. 16. TPMP Genetics pending from prior admission. (drawn 1/14/17)    Further recommendations pending clinical course    Puneet Confer, NP    Patient is seen and examined in collaboration with Dr. Luis Campbell. .  Assessment and plan as per Dr. Luis Campbell. Feels better this evening. Hgb dropped to 6.5 prior to transfusion. Less abdominal pain. Given tylenol for fever and now is afebrile. HR 68,  systolic. Continue Zosyn and po Flagyl, but if worsens consider IM consult. Could consider changing to IV Flagyl and po Vanc + Zosyn. Monitor bleeding and tx as needed. Consider repeating CT A/P and or Flex sig.   Enrrique Betancourt MD

## 2017-01-18 NOTE — IP AVS SNAPSHOT
[address] [facility] 
[phone] Patient: Rakesh Maier MRN: [id number] :1970 You are allergic to the following Allergen Reactions Ibuprofen Other (comments) Colitis Sulfa (Sulfonamide Antibiotics) Rash Recent Documentation Height Weight BMI Smoking Status 1.753 m 94 kg 30.6 kg/m2 Never Smoker Emergency Contacts Name Discharge Info Relation Home Work Mobile [place] Stacy  Spouse [3] [phone] About your hospitalization You were admitted on:  2017 You last received care in the:  [facility] 6 MED SURG You were discharged on:  2017 Unit phone number:  [phone] Why you were hospitalized Your primary diagnosis was:  Ulcerative Colitis (Hcc) Your diagnoses also included:  Acute Blood Loss Anemia, Clostridium Difficile Diarrhea, Abdominal Pain, Fever, Rectal Bleed, Prolapsed Hemorrhoids, Current Chronic Use Of Systemic Steroids Providers Seen During Your Hospitalizations Provider Role Specialty Primary office phone Keara Bruce MD Attending Provider Emergency Medicine [phone] Rik Baker MD Attending Provider Gastroenterology [phone] Your Primary Care Physician (PCP) Primary Care Physician Office Phone Office Fax Germaine Nava [phone] Follow-up Information Follow up With Details Comments Contact Info Chio Garcia MD   [address] [facility] Internal Medicine an 
[place] [address] 
[phone] Rik Baker MD  office will call patient with appointment date and time. [address] [facility] [address] [facility] [address] 
[phone] Current Discharge Medication List  
  
START taking these medications Dose & Instructions Dispensing Information Comments Morning Noon Evening Bedtime  
 hydrocortisone 2.5 % rectal cream  
Commonly known as:  ANUSOL-HC Dose:  1 Tube Insert 1 Tube into rectum as needed for Hemorrhoids. Quantity:  30 g Refills:  2  
     
   
   
   
  
 vancomycin 50 mg/mL Soln oral solution (compounded) Your next dose is: Today Dose:  250 mg Take 5 mL by mouth every six (6) hours for 10 days. Quantity:  200 mL Refills:  0 CONTINUE these medications which have NOT CHANGED Dose & Instructions Dispensing Information Comments Morning Noon Evening Bedtime BENTYL 10 mg capsule Generic drug:  dicyclomine Your next dose is: Today Dose:  10 mg Take 10 mg by mouth four (4) times daily. Refills:  0  
     
   
   
  
   
  
  
 folic acid 1 mg tablet Commonly known as:  [employer] Your next dose is:  Tomorrow Dose:  1 mg Take 1 mg by mouth daily. Refills:  0  
     
   
   
   
  
 hydrocortisone 25 mg Supp Commonly known as:  ANUSOL-HC Your next dose is: Today Dose:  25 mg Insert 1 Suppository into rectum every twelve (12) hours. Quantity:  14 Suppository Refills:  2 LIALDA 1.2 gram delayed release tablet Generic drug:  mesalamine Your next dose is: Today Dose:  2.4 g Take 2.4 g by mouth ACB/HS. Refills:  0 NORCO 5-325 mg per tablet Generic drug:  HYDROcodone-acetaminophen Dose:  1 Tab Take 1 Tab by mouth every six (6) hours as needed for Pain. Refills:  0  
     
   
   
   
  
 predniSONE 10 mg tablet Commonly known as:  [employer] Take 4 tabs PO daily for 7 days, then 3 tabs PO for 7 days, then 2 tabs PO for 7 days, then 1 tab PO for 7 days. Then stop. Indications: Ulcerative Colitis Quantity:  70 Tab Refills:  0  
     
   
   
   
  
 promethazine 25 mg tablet Commonly known as:  PHENERGAN Dose:  25 mg Take 1 Tab by mouth every eight (8) hours as needed. Quantity:  15 Tab Refills:  0 PROTONIX 40 mg tablet Generic drug:  pantoprazole Your next dose is:  Tomorrow Dose:  40 mg Take 40 mg by mouth daily. Refills:  0 REMICADE IV  
   
 by IntraVENous route. Refills:  0 SE-TAN PLUS PO Your next dose is:  Tomorrow Dose:  1 Tab Take 1 Tab by mouth daily. Refills:  0  
     
   
   
   
  
 traMADol 50 mg tablet Commonly known as:  ULTRAM  
   
 Dose:  50 mg Take 50 mg by mouth every six (6) hours as needed for Pain. Refills:  0 ZOFRAN (AS HYDROCHLORIDE) 4 mg tablet Generic drug:  ondansetron hcl Dose:  4 mg Take 4 mg by mouth every four (4) hours as needed for Nausea. Refills:  0 STOP taking these medications   
 metroNIDAZOLE 500 mg tablet Commonly known as:  FLAGYL Where to Get Your Medications Information on where to get these meds will be given to you by the nurse or doctor. ! Ask your nurse or doctor about these medications  
  hydrocortisone 2.5 % rectal cream  
 hydrocortisone 25 mg Supp  
 predniSONE 10 mg tablet  
 vancomycin 50 mg/mL Soln oral solution (compounded) Discharge Instructions DISCHARGE SUMMARY from Nurse The following personal items are in your possession at time of discharge: 
 
Dental Appliances: None Visual Aid: Glasses, At bedside Home Medications: Kept at bedside Jewelry: Watch Clothing: At bedside Other Valuables: Cell Phone PATIENT INSTRUCTIONS: 
 
 
F-face looks uneven A-arms unable to move or move unevenly S-speech slurred or non-existent T-time-call 911 as soon as signs and symptoms begin-DO NOT go Back to bed or wait to see if you get better-TIME IS BRAIN. Warning Signs of HEART ATTACK Call 911 if you have these symptoms: 
? Chest discomfort. Most heart attacks involve discomfort in the center of the chest that lasts more than a few minutes, or that goes away and comes back. It can feel like uncomfortable pressure, squeezing, fullness, or pain. ? Discomfort in other areas of the upper body. Symptoms can include pain or discomfort in one or both arms, the back, neck, jaw, or stomach. ? Shortness of breath with or without chest discomfort. ? Other signs may include breaking out in a cold sweat, nausea, or lightheadedness. Don't wait more than five minutes to call [address]! Fast action can save your life. Calling 911 is almost always the fastest way to get lifesaving treatment. Emergency Medical Services staff can begin treatment when they arrive  up to an hour sooner than if someone gets to the hospital by car. The discharge information has been reviewed with the patient. The patient verbalized understanding. Discharge medications reviewed with the patient and appropriate educational materials and side effects teaching were provided. Ulcerative Colitis: Care Instructions Your Care Instructions Ulcerative colitis is an inflammatory bowel disease (IBD). The large intestine (colon) gets inflamed and ulcers form in your colon. These ulcers can bleed. People have \"attacks\" of ulcerative colitis. Attacks can come and go. They can cause painful belly cramps and bloody diarrhea. This disease can affect part or all of the colon. How bad the disease gets will often depend on how much of the colon is affected. Bad attacks are often treated in a hospital. There you can get medicines, fluid, and nutrition through a tube in your vein, called an IV. This lets your digestive system rest and recover. If the medicines don't work well, surgery may be needed to remove the colon. At home, you can help control your ulcerative colitis. Take your medicines and try to eat well. And see your doctor as much as he or she recommends. Follow-up care is a key part of your treatment and safety. Be sure to make and go to all appointments, and call your doctor if you are having problems. It's also a good idea to know your test results and keep a list of the medicines you take. How can you care for yourself at home? · Be safe with medicines. Take your medicines exactly as prescribed. Call your doctor if you think you are having a problem with your medicine. You will get more details on the specific medicines your doctor prescribes. · Do not take anti-inflammatory medicines, such as aspirin, ibuprofen (Advil, Motrin), or naproxen (Aleve). They may make your symptoms worse. · Talk to your doctor before you take any other medicines or herbal products. · Eat healthy foods. Avoid foods that make your symptoms worse. These might include milk, alcohol, or spicy foods. · Make sure to get enough iron. Rectal bleeding may make you lose iron. Foods with a lot of iron include beef, lentils, spinach, and raisins. They also include iron-enriched breads and cereals. · Take any nutrition supplements that your doctor prescribes. · This disease can affect all parts of your life. Get support from friends and family. You may also want to get some counseling. When should you call for help? Call 911 anytime you think you may need emergency care. For example, call if: 
· You have severe belly pain. · You passed out (lost consciousness). Call your doctor now or seek immediate medical care if: · You have signs of needing more fluids. You have sunken eyes and a dry mouth, and you pass only a little dark urine. · You have a fever or shaking chills. · Your belly is bloated. Watch closely for changes in your health, and be sure to contact your doctor if: 
· Your symptoms get worse. · You pass new bloody stools, or the bloody stools are worse. · You have diarrhea for more than 2 weeks. · You have unexplained weight loss. Where can you learn more? Go to http://obinna-valentín.info/. Enter R238 in the search box to learn more about \"Ulcerative Colitis: Care Instructions. \" Current as of: August 9, 2016 Content Version: 11.1 © 5573-8668 Breaker. Care instructions adapted under license by [employer] (which disclaims liability or warranty for this information). If you have questions about a medical condition or this instruction, always ask your healthcare professional. [place] [address] any warranty or liability for your use of this information. Discharge Orders None [employer] Announcement We are excited to announce that we are making your provider's discharge notes available to you in [employer]. You will see these notes when they are completed and signed by the physician that discharged you from your recent hospital stay. If you have any questions or concerns about any information you see in [employer], please call the Health Information Department where you were seen or reach out to your Primary Care Provider for more information about your plan of care. Introducing [facility]! [address] introduces [employer] patient portal. Now you can access parts of your medical record, email your doctor's office, and request medication refills online. 1. In your internet browser, go to [email or web address] 2. Click on the First Time User? Click Here link in the Sign In box.  You will see the New Member Sign Up page. 3. Enter your [employer] Access Code exactly as it appears below. You will not need to use this code after youve completed the sign-up process. If you do not sign up before the expiration date, you must request a new code. · [employer] Access Code: [email or web address] Expires: 3/13/2017 10:57 AM 
 
4. Enter the last four digits of your Social Security Number (xxxx) and Date of Birth (mm/dd/yyyy) as indicated and click Submit. You will be taken to the next sign-up page. 5. Create a [employer] ID. This will be your [employer] login ID and cannot be changed, so think of one that is secure and easy to remember. 6. Create a [employer] password. You can change your password at any time. 7. Enter your Password Reset Question and Answer. This can be used at a later time if you forget your password. 8. Enter your e-mail address. You will receive e-mail notification when new information is available in [address] 9. Click Sign Up. You can now view and download portions of your medical record. 10. Click the Download Summary menu link to download a portable copy of your medical information. If you have questions, please visit the Frequently Asked Questions section of the [employer] website. Remember, [employer] is NOT to be used for urgent needs. For medical emergencies, dial 911. Now available from your iPhone and Android! General Information Please provide this summary of care documentation to your next provider. Patient Signature:  ____________________________________________________________ Date:  ____________________________________________________________  
  
Mookie Ball Provider Signature:  ____________________________________________________________ Date:  ____________________________________________________________

## 2017-01-18 NOTE — ED NOTES
TRANSFER - OUT REPORT:    Verbal report given to Rn on [address]  being transferred to [facility] for routine progression of care       Report consisted of patients Situation, Background, Assessment and   Recommendations(SBAR). Information from the following report(s) SBAR, Kardex, ED Summary, Intake/Output and MAR was reviewed with the receiving nurse. Lines:   Peripheral IV 01/18/17 Right Antecubital (Active)   Site Assessment Clean, dry, & intact 1/18/2017 10:10 AM   Phlebitis Assessment 0 1/18/2017 10:10 AM   Infiltration Assessment 0 1/18/2017 10:10 AM   Dressing Status Clean, dry, & intact 1/18/2017 10:10 AM       Peripheral IV 01/18/17 Left Forearm (Active)   Site Assessment Clean, dry, & intact 1/18/2017 10:10 AM   Phlebitis Assessment 0 1/18/2017 10:10 AM   Infiltration Assessment 0 1/18/2017 10:10 AM   Dressing Status Clean, dry, & intact 1/18/2017 10:10 AM        Opportunity for questions and clarification was provided.       Patient transported with:   [employer]

## 2017-01-18 NOTE — ED TRIAGE NOTES
Per ems patient was admitted last week for ulcerative colitis discharged Monday from hospital, patient is complaining of diarrhea and with red streaks, Upon arrival patient bp 70/60 250ml normal saline 106/60. Patient is complaining of lower abd pain at this time.         bgl 90

## 2017-01-18 NOTE — ED PROVIDER NOTES
HPI Comments: patient was discharged yesterday. He was here for ulcerative colitis and found to also have C. Difficile. Reports 3 bloody bowel movements this morning. He reports there is a large amount of blood. He was discharged on Flagyl. He reports that he fell several times this morning because he felt so weak from his diarrhea. Patient is a 40 y.o. male presenting with abdominal pain. The history is provided by the patient. Abdominal Pain    This is a recurrent problem. The current episode started 12 to 24 hours ago. The problem occurs constantly. The problem has been rapidly worsening. The pain is located in the LLQ. The quality of the pain is cramping and sharp. The pain is severe. Associated symptoms include a fever (39 C), diarrhea and nausea. Pertinent negatives include no vomiting, no dysuria and no frequency. Nothing worsens the pain. The pain is relieved by nothing. Past workup includes CT scan. His past medical history is significant for ulcerative colitis. Past Medical History:   Diagnosis Date    Ulcerative colitis [facility])        Past Surgical History:   Procedure Laterality Date    Flexible sigmoidoscopy N/A 12/5/2016     SIGMOIDOSCOPY FLEXIBLE performed by Kuldip Gooden MD at [facility] ENDOSCOPY         No family history on file. Social History     Social History    Marital status:      Spouse name: N/A    Number of children: N/A    Years of education: N/A     Occupational History    Not on file. Social History Main Topics    Smoking status: Never Smoker    Smokeless tobacco: Not on file    Alcohol use Yes    Drug use: No    Sexual activity: Not on file     Other Topics Concern    Not on file     Social History Narrative         ALLERGIES: Ibuprofen and Sulfa (sulfonamide antibiotics)    Review of Systems   Constitutional: Positive for fever (39 C). Gastrointestinal: Positive for abdominal pain, diarrhea and nausea. Negative for vomiting.    Genitourinary: Negative for dysuria and frequency. All other systems reviewed and are negative. Vitals:    01/18/17 1020 01/18/17 1023 01/18/17 1038 01/18/17 1053   BP: 112/59 109/56 104/59 109/57   Pulse: 89 93 87 93   Resp: 27  14    Temp:       SpO2:   93% 96%   Weight:       Height:                Physical Exam   Constitutional: He is oriented to person, place, and time. He appears well-developed and well-nourished. No distress. HENT:   Head: Normocephalic and atraumatic. Neck: Normal range of motion. Neck supple. Cardiovascular: Normal rate and regular rhythm. Pulmonary/Chest: Effort normal and breath sounds normal. No respiratory distress. He has no wheezes. Abdominal: Soft. He exhibits no distension. There is tenderness (mild). There is no rebound and no guarding. Musculoskeletal: Normal range of motion. He exhibits no edema. Neurological: He is alert and oriented to person, place, and time. No cranial nerve deficit. Skin: Skin is warm and dry. He is not diaphoretic. Psychiatric: He has a normal mood and affect. His behavior is normal.   Nursing note and vitals reviewed. MDM  Number of Diagnoses or Management Options  Anemia due to acute blood loss:   Clostridium difficile colitis:   Ulcerative colitis with rectal bleeding, unspecified location [facility]):   Diagnosis management comments: Patient's hemoglobin was trending down he needs blood transfusion was ordered. Given several liters of fluid and by mouth vancomycin. He is also given Solu-Cortef. Discussed with GI. No further abx recommendations or other steroids. They will admit. Amount and/or Complexity of Data Reviewed  Clinical lab tests: ordered and reviewed  Review and summarize past medical records: yes (I saw pt last ED visit. Dx with Cdiff.   His BMs had decreased to 3-4 at time of discharge.)  Discuss the patient with other providers: yes  Independent visualization of images, tracings, or specimens: yes (Sinus tach no ST changes  )    Risk of Complications, Morbidity, and/or Mortality  Presenting problems: high  Diagnostic procedures: moderate  Management options: high    Patient Progress  Patient progress: improved    ED Course       Procedures

## 2017-01-19 LAB
ALBUMIN SERPL BCP-MCNC: 1.8 G/DL (ref 3.5–5)
ALBUMIN/GLOB SERPL: 0.6 {RATIO} (ref 1.2–3.5)
ALP SERPL-CCNC: 50 U/L (ref 50–136)
ALT SERPL-CCNC: 12 U/L (ref 12–65)
ANION GAP BLD CALC-SCNC: 9 MMOL/L (ref 7–16)
AST SERPL W P-5'-P-CCNC: 10 U/L (ref 15–37)
BASOPHILS # BLD AUTO: 0 K/UL (ref 0–0.2)
BASOPHILS # BLD: 0 % (ref 0–2)
BILIRUB SERPL-MCNC: 0.3 MG/DL (ref 0.2–1.1)
BUN SERPL-MCNC: 9 MG/DL (ref 6–23)
CALCIUM SERPL-MCNC: 7.6 MG/DL (ref 8.3–10.4)
CHLORIDE SERPL-SCNC: 110 MMOL/L (ref 98–107)
CO2 SERPL-SCNC: 24 MMOL/L (ref 21–32)
CREAT SERPL-MCNC: 0.61 MG/DL (ref 0.8–1.5)
DIFFERENTIAL METHOD BLD: ABNORMAL
EOSINOPHIL # BLD: 0 K/UL (ref 0–0.8)
EOSINOPHIL NFR BLD: 0 % (ref 0.5–7.8)
ERYTHROCYTE [DISTWIDTH] IN BLOOD BY AUTOMATED COUNT: 18.6 % (ref 11.9–14.6)
GLOBULIN SER CALC-MCNC: 3 G/DL (ref 2.3–3.5)
GLUCOSE SERPL-MCNC: 89 MG/DL (ref 65–100)
HCT VFR BLD AUTO: 25.1 % (ref 41.1–50.3)
HCT VFR BLD AUTO: 25.1 % (ref 41.1–50.3)
HCT VFR BLD AUTO: 26 % (ref 41.1–50.3)
HGB BLD-MCNC: 7.7 G/DL (ref 13.6–17.2)
HGB BLD-MCNC: 7.8 G/DL (ref 13.6–17.2)
HGB BLD-MCNC: 7.9 G/DL (ref 13.6–17.2)
IMM GRANULOCYTES # BLD: 0.1 K/UL (ref 0–0.5)
IMM GRANULOCYTES NFR BLD AUTO: 0.6 % (ref 0–5)
LYMPHOCYTES # BLD AUTO: 3 % (ref 13–44)
LYMPHOCYTES # BLD: 0.6 K/UL (ref 0.5–4.6)
MCH RBC QN AUTO: 24.8 PG (ref 26.1–32.9)
MCHC RBC AUTO-ENTMCNC: 30.4 G/DL (ref 31.4–35)
MCV RBC AUTO: 81.8 FL (ref 79.6–97.8)
MONOCYTES # BLD: 0.6 K/UL (ref 0.1–1.3)
MONOCYTES NFR BLD AUTO: 3 % (ref 4–12)
NEUTS SEG # BLD: 16.8 K/UL (ref 1.7–8.2)
NEUTS SEG NFR BLD AUTO: 93 % (ref 43–78)
PLATELET # BLD AUTO: 389 K/UL (ref 150–450)
PMV BLD AUTO: 9.1 FL (ref 10.8–14.1)
POTASSIUM SERPL-SCNC: 3.6 MMOL/L (ref 3.5–5.1)
PROT SERPL-MCNC: 4.8 G/DL (ref 6.3–8.2)
RBC # BLD AUTO: 3.18 M/UL (ref 4.23–5.67)
SODIUM SERPL-SCNC: 143 MMOL/L (ref 136–145)
WBC # BLD AUTO: 18 K/UL (ref 4.3–11.1)

## 2017-01-19 PROCEDURE — 85025 COMPLETE CBC W/AUTO DIFF WBC: CPT | Performed by: INTERNAL MEDICINE

## 2017-01-19 PROCEDURE — 85018 HEMOGLOBIN: CPT | Performed by: INTERNAL MEDICINE

## 2017-01-19 PROCEDURE — 36430 TRANSFUSION BLD/BLD COMPNT: CPT

## 2017-01-19 PROCEDURE — 36415 COLL VENOUS BLD VENIPUNCTURE: CPT | Performed by: INTERNAL MEDICINE

## 2017-01-19 PROCEDURE — 74011000250 HC RX REV CODE- 250: Performed by: NURSE PRACTITIONER

## 2017-01-19 PROCEDURE — 74011000258 HC RX REV CODE- 258: Performed by: INTERNAL MEDICINE

## 2017-01-19 PROCEDURE — P9016 RBC LEUKOCYTES REDUCED: HCPCS | Performed by: EMERGENCY MEDICINE

## 2017-01-19 PROCEDURE — 74011250636 HC RX REV CODE- 250/636: Performed by: INTERNAL MEDICINE

## 2017-01-19 PROCEDURE — 80053 COMPREHEN METABOLIC PANEL: CPT | Performed by: INTERNAL MEDICINE

## 2017-01-19 PROCEDURE — 74011250637 HC RX REV CODE- 250/637: Performed by: NURSE PRACTITIONER

## 2017-01-19 PROCEDURE — 77030013131 HC IV BLD ST ICUM -A

## 2017-01-19 PROCEDURE — 74011250637 HC RX REV CODE- 250/637: Performed by: INTERNAL MEDICINE

## 2017-01-19 PROCEDURE — 65270000029 HC RM PRIVATE

## 2017-01-19 PROCEDURE — 74011250636 HC RX REV CODE- 250/636: Performed by: NURSE PRACTITIONER

## 2017-01-19 RX ORDER — METRONIDAZOLE 500 MG/100ML
500 INJECTION, SOLUTION INTRAVENOUS EVERY 8 HOURS
Status: DISCONTINUED | OUTPATIENT
Start: 2017-01-19 | End: 2017-01-23

## 2017-01-19 RX ORDER — MESALAMINE 800 MG/1
1600 TABLET, DELAYED RELEASE ORAL 3 TIMES DAILY
Status: DISCONTINUED | OUTPATIENT
Start: 2017-01-19 | End: 2017-01-19

## 2017-01-19 RX ORDER — SODIUM CHLORIDE 9 MG/ML
250 INJECTION, SOLUTION INTRAVENOUS AS NEEDED
Status: DISCONTINUED | OUTPATIENT
Start: 2017-01-19 | End: 2017-01-31 | Stop reason: SDUPTHER

## 2017-01-19 RX ORDER — MESALAMINE 1.2 G/1
2.4 TABLET, DELAYED RELEASE ORAL 2 TIMES DAILY WITH MEALS
Status: DISCONTINUED | OUTPATIENT
Start: 2017-01-19 | End: 2017-02-07 | Stop reason: HOSPADM

## 2017-01-19 RX ADMIN — Medication 10 ML: at 14:51

## 2017-01-19 RX ADMIN — MESALAMINE 2400 MG: 1.2 TABLET, DELAYED RELEASE ORAL at 21:44

## 2017-01-19 RX ADMIN — Medication 10 ML: at 21:47

## 2017-01-19 RX ADMIN — PIPERACILLIN SODIUM,TAZOBACTAM SODIUM 3.38 G: 3; .375 INJECTION, POWDER, FOR SOLUTION INTRAVENOUS at 17:30

## 2017-01-19 RX ADMIN — PIPERACILLIN SODIUM,TAZOBACTAM SODIUM 3.38 G: 3; .375 INJECTION, POWDER, FOR SOLUTION INTRAVENOUS at 05:57

## 2017-01-19 RX ADMIN — ZOLPIDEM TARTRATE 5 MG: 5 TABLET, FILM COATED ORAL at 00:05

## 2017-01-19 RX ADMIN — Medication 10 ML: at 05:58

## 2017-01-19 RX ADMIN — METRONIDAZOLE 500 MG: 500 INJECTION, SOLUTION INTRAVENOUS at 14:48

## 2017-01-19 RX ADMIN — HYDROMORPHONE HYDROCHLORIDE 1 MG: 1 INJECTION, SOLUTION INTRAMUSCULAR; INTRAVENOUS; SUBCUTANEOUS at 03:40

## 2017-01-19 RX ADMIN — METRONIDAZOLE 500 MG: 500 INJECTION, SOLUTION INTRAVENOUS at 21:44

## 2017-01-19 RX ADMIN — METRONIDAZOLE 500 MG: 500 TABLET ORAL at 05:57

## 2017-01-19 RX ADMIN — PIPERACILLIN SODIUM,TAZOBACTAM SODIUM 3.38 G: 3; .375 INJECTION, POWDER, FOR SOLUTION INTRAVENOUS at 00:05

## 2017-01-19 RX ADMIN — SODIUM CHLORIDE 250 ML: 900 INJECTION, SOLUTION INTRAVENOUS at 14:53

## 2017-01-19 RX ADMIN — VANCOMYCIN HYDROCHLORIDE 125 MG: 1 INJECTION, POWDER, LYOPHILIZED, FOR SOLUTION INTRAVENOUS at 18:21

## 2017-01-19 RX ADMIN — METHYLPREDNISOLONE SODIUM SUCCINATE 40 MG: 40 INJECTION, POWDER, FOR SOLUTION INTRAMUSCULAR; INTRAVENOUS at 21:44

## 2017-01-19 RX ADMIN — FOLIC ACID 1 MG: 1 TABLET ORAL at 09:12

## 2017-01-19 RX ADMIN — VANCOMYCIN HYDROCHLORIDE 125 MG: 1 INJECTION, POWDER, LYOPHILIZED, FOR SOLUTION INTRAVENOUS at 12:37

## 2017-01-19 RX ADMIN — METHYLPREDNISOLONE SODIUM SUCCINATE 40 MG: 40 INJECTION, POWDER, FOR SOLUTION INTRAMUSCULAR; INTRAVENOUS at 09:12

## 2017-01-19 NOTE — PROGRESS NOTES
GI DAILY PROGRESS NOTE    Admit Date:  1/18/2017    Today's Date:  1/19/2017    CC:  UC, C Diff, Diarrhea, Anemia    Subjective:     Patient reports continued loose stools (7 overnight) with BRRB. Continue moderate abd pain. Some nausea, relieved with Phenergan. Medications:   Current Facility-Administered Medications   Medication Dose Route Frequency    mesalamine DR (ASACOL HD) tablet 1,600 mg  1,600 mg Oral TID    0.9% sodium chloride infusion 250 mL  250 mL IntraVENous PRN    folic acid (FOLVITE) tablet 1 mg  1 mg Oral DAILY    metroNIDAZOLE (FLAGYL) tablet 500 mg  500 mg Oral Q8H    promethazine (PHENERGAN) tablet 12.5 mg  12.5 mg Oral Q6H PRN    sodium chloride (NS) flush 5-10 mL  5-10 mL IntraVENous Q8H    sodium chloride (NS) flush 5-10 mL  5-10 mL IntraVENous PRN    ondansetron (ZOFRAN) injection 4 mg  4 mg IntraVENous Q4H PRN    zolpidem (AMBIEN) tablet 5 mg  5 mg Oral QHS PRN    0.9% sodium chloride with KCl 20 mEq/L infusion   IntraVENous CONTINUOUS    HYDROmorphone (PF) (DILAUDID) injection 1 mg  1 mg IntraVENous Q6H PRN    piperacillin-tazobactam (ZOSYN) 3.375 g in 0.9% sodium chloride (MBP/ADV) 100 mL  3.375 g IntraVENous Q8H    acetaminophen (TYLENOL) tablet 650 mg  650 mg Oral Q4H PRN    methylPREDNISolone (PF) (SOLU-MEDROL) injection 40 mg  40 mg IntraVENous Q12H       Review of Systems:  ROS was obtained, with pertinent positives as listed above. No chest pain or SOB. Diet:  Clear Liquid    Objective:   Vitals:  Visit Vitals    /67 (BP 1 Location: Right arm, BP Patient Position: At rest)    Pulse 83    Temp 98.2 °F (36.8 °C)    Resp 18    Ht 5' 9\" (1.753 m)    Wt 93.4 kg (206 lb)    SpO2 94%    BMI 30.42 kg/m2     Intake/Output:     01/17 1901 - 01/19 0700  In: 1336.8 [P.O.:120;  I.V.:885]  Out: -   Exam:  General appearance: alert, cooperative, no distress  Lungs: clear to auscultation bilaterally anteriorly  Heart: regular rate and rhythm  Abdomen: soft, mod ttp over entire abd. Bowel sounds normal. No masses, no organomegaly  Extremities: extremities normal, atraumatic, no cyanosis or edema  Neuro:  alert and oriented    Data Review (Labs):    Recent Labs      01/19/17   0815  01/19/17   0045  01/18/17   1400  01/18/17   1007  01/17/17   0555   WBC   --   18.0*   --   26.8*  12.7*   HGB  7.7*  7.9*  6.5*  7.0*  8.4*   HCT  25.1*  26.0*  21.5*  23.8*  28.6*   PLT   --   389   --   539*  551*   MCV   --   81.8   --   82.9  82.7   NA   --   143   --   144  144   K   --   3.6   --   3.0*  3.3*   CL   --   110*   --   109*  109*   CO2   --   24   --   25  27   BUN   --   9   --   9  7   CREA   --   0.61*   --   1.14  0.65*   CA   --   7.6*   --   7.6*  8.0*   GLU   --   89   --   69  110*   AP   --   50   --   52   --    SGOT   --   10*   --   7*   --    ALT   --   12   --   9*   --    TBILI   --   0.3   --   0.3   --    ALB   --   1.8*   --   2.0*   --    TP   --   4.8*   --   5.4*   --    PTP   --    --   13.6*   --    --    INR   --    --   1.2   --    --      KUB 1/18/17  IMPRESSION: Nonspecific bowel gas pattern appreciated. There are prominent small  bowel loops in the left upper abdominal quadrant and a paucity of air distally. No definite acute abnormality. Assessment:     Active Problems:    Rectal bleed (12/1/2016)      Abdominal pain (12/1/2016)      Fever (1/12/2017)      Acute blood loss anemia (1/18/2017)      Ulcerative colitis ([address] (1/18/2017)      Clostridium difficile diarrhea (1/18/2017)      41 yo male who is being admitted due to hypotension, increased diarrhea with C.diff and UC, ABLA with drop in HGB from 8.4 to 7.0 overnight. He was started on Remicade therapy during a previous hospitalization on 12/9/16, and received the second dose on 12/22/16. He is due for 3rd dose on 1/19/17, which we are holding due to C.diff. Our office is aware. Continues to have bloody diarrhea with no significant rise in hgb despite 2 U PRBC, now hgb 7.7.   KUB w nonspecific bowel gas pattern. Bloody diarrhea could be active inflammation from UC or incompletely treated or flagyl resistent C Diff  Plan:      1. Monitor HGB, with transfuse 2 additional U PRBC  2. Resume Flagyl 500mg PO TID (day 7). Will add oral vancomycin (C Diff positive 1/12/17)  3. Continue solumedrol IV  4. D/C home dose ASA  5. Continue antiemetics and pain control  6. Continue clear Liquid Diet  7. Urine and blood cultures pending   8. Zosyn IV for broad spectrum coverage until cultures return. 9. TPMP Genetics pending from prior admission. (drawn 1/14/17)     Yun Carpenter NP  Patient is seen and examined in collaboration with Dr. Margoth Draper. Assessment and plan as per Dr. Rafael Reilly. Plan IV Flagyl, po Vanc and continue Zosyn. Continue to transfuse as above. Flex sig if bleeding persists.   Lita Shipley MD

## 2017-01-19 NOTE — PROGRESS NOTES
Care Management Interventions  PCP Verified by CM: Yes  Transition of Care Consult (CM Consult): Discharge Planning  Current Support Network: Lives with Spouse  Plan discussed with Pt/Family/Caregiver: Yes   Patient is 39 yo male admitted for Anemia and Cdiff. Prior to admission patient states was independent of ADL's still drives and works. Lives with his wife. Discharge plan is to return home with spouse.

## 2017-01-19 NOTE — PROGRESS NOTES
Problem: Nutrition Deficit  Goal: *Optimize nutritional status  Nutrition  Reason for assessment: Referral received from nursing admission Malnutrition Screening Tool for recently lost 14-24# without trying and eating poorly due to decreased appetite. Assessment:   Diet order(s): Clear liquid  Food/Nutrition Patient History:  Patient admitted with h/o of UC and positive C. Diff test. Patient states he has been having a lot of diarrhea and cannot handle broth in the mornings. Patient reports that his UBW is 102 kg (prior to December). He reports a weight loss in December to 90 kg and says he now weighs around 93 kg. Weight recorded at 93.4 per patient statement. Patient reports he ate 100% of dinner last night. Meds: Acknowledge patient is on Solu-medrol and noted BS control. Pertinent labs:   POC: Glucose 100-124 mg/dL  Weights recorded:  Weight Loss Metrics 1/18/2017 1/17/2017 12/1/2016   Today's Wt 206 lb 206 lb 1.6 oz 209 lb 7 oz   BMI 30.42 kg/m2 30.44 kg/m2 30.93 kg/m2   Dietetic intern cannot confirm accurate weights d/t patient stated. RD ordered weight to accurately assess weight loss. Anthropometrics:Height: 5' 9\" (175.3 cm),  Weight: 93.4 kg (206 lb), Weight Source: Patient stated, Body mass index is 30.42 kg/(m^2). BMI class of obesity class I. Macronutrient needs:  EER:  1213-3508 kcal /day (20-25 kcal/kg actual BW)  EPR:  73-87 grams protein/day (1.0-1.2 grams/kg IBW)  Intake/Comparative Standards: The clear liquid diet does not meet patient's estimated needs. Nutrition Diagnosis: Inadequate oral intake related to UC and diarrhea as evidenced by clear liquid diet not meeting patient estimated needs and patient reporting not tolerating beef broth well in the mornings. Intervention:  Meals and snacks: Continue current diet. Patient has requested he not receive broth in the mornings, and receive BEEF broth at lunch and dinner.   Nutrition Supplement Therapy: Ensure clear, bid (breakfast and dinner)      Lionel Schulz, Dietetic Intern

## 2017-01-19 NOTE — DISCHARGE INSTRUCTIONS
DISCHARGE SUMMARY from Nurse    The following personal items are in your possession at time of discharge:    Dental Appliances: None  Visual Aid: Glasses, At bedside     Home Medications: Kept at bedside  Jewelry: Watch  Clothing: At bedside  Other Valuables: Cell Phone             PATIENT INSTRUCTIONS:    After general anesthesia or intravenous sedation, for 24 hours or while taking prescription Narcotics:  · Limit your activities  · Do not drive and operate hazardous machinery  · Do not make important personal or business decisions  · Do  not drink alcoholic beverages  · If you have not urinated within 8 hours after discharge, please contact your surgeon on call. Report the following to your surgeon:  · Excessive pain, swelling, redness or odor of or around the surgical area  · Temperature over 100.5  · Nausea and vomiting lasting longer than 4 hours or if unable to take medications  · Any signs of decreased circulation or nerve impairment to extremity: change in color, persistent  numbness, tingling, coldness or increase pain  · Any questions        What to do at Home:  Recommended activity: Activity as tolerated, diet as tolerated    If you experience any of the following symptoms increase in pain, blood in stool, fever, or increase in pain, please follow up with [facility]. *  Please give a list of your current medications to your Primary Care Provider. *  Please update this list whenever your medications are discontinued, doses are      changed, or new medications (including over-the-counter products) are added. *  Please carry medication information at all times in case of emergency situations. These are general instructions for a healthy lifestyle:    No smoking/ No tobacco products/ Avoid exposure to second hand smoke    Surgeon General's Warning:  Quitting smoking now greatly reduces serious risk to your health.     Obesity, smoking, and sedentary lifestyle greatly increases your risk for illness    A healthy diet, regular physical exercise & weight monitoring are important for maintaining a healthy lifestyle    You may be retaining fluid if you have a history of heart failure or if you experience any of the following symptoms:  Weight gain of 3 pounds or more overnight or 5 pounds in a week, increased swelling in our hands or feet or shortness of breath while lying flat in bed. Please call your doctor as soon as you notice any of these symptoms; do not wait until your next office visit. Recognize signs and symptoms of STROKE:    F-face looks uneven    A-arms unable to move or move unevenly    S-speech slurred or non-existent    T-time-call 911 as soon as signs and symptoms begin-DO NOT go       Back to bed or wait to see if you get better-TIME IS BRAIN. Warning Signs of HEART ATTACK     Call 911 if you have these symptoms:   Chest discomfort. Most heart attacks involve discomfort in the center of the chest that lasts more than a few minutes, or that goes away and comes back. It can feel like uncomfortable pressure, squeezing, fullness, or pain.  Discomfort in other areas of the upper body. Symptoms can include pain or discomfort in one or both arms, the back, neck, jaw, or stomach.  Shortness of breath with or without chest discomfort.  Other signs may include breaking out in a cold sweat, nausea, or lightheadedness. Don't wait more than five minutes to call 911 - MINUTES MATTER! Fast action can save your life. Calling 911 is almost always the fastest way to get lifesaving treatment. Emergency Medical Services staff can begin treatment when they arrive -- up to an hour sooner than if someone gets to the hospital by car. The discharge information has been reviewed with the patient. The patient verbalized understanding. Discharge medications reviewed with the patient and appropriate educational materials and side effects teaching were provided. Ulcerative Colitis: Care Instructions  Your Care Instructions    Ulcerative colitis is an inflammatory bowel disease (IBD). The large intestine (colon) gets inflamed and ulcers form in your colon. These ulcers can bleed. People have \"attacks\" of ulcerative colitis. Attacks can come and go. They can cause painful belly cramps and bloody diarrhea. This disease can affect part or all of the colon. How bad the disease gets will often depend on how much of the colon is affected. Bad attacks are often treated in a hospital. There you can get medicines, fluid, and nutrition through a tube in your vein, called an IV. This lets your digestive system rest and recover. If the medicines don't work well, surgery may be needed to remove the colon. At home, you can help control your ulcerative colitis. Take your medicines and try to eat well. And see your doctor as much as he or she recommends. Follow-up care is a key part of your treatment and safety. Be sure to make and go to all appointments, and call your doctor if you are having problems. It's also a good idea to know your test results and keep a list of the medicines you take. How can you care for yourself at home? · Be safe with medicines. Take your medicines exactly as prescribed. Call your doctor if you think you are having a problem with your medicine. You will get more details on the specific medicines your doctor prescribes. · Do not take anti-inflammatory medicines, such as aspirin, ibuprofen (Advil, Motrin), or naproxen (Aleve). They may make your symptoms worse. · Talk to your doctor before you take any other medicines or herbal products. · Eat healthy foods. Avoid foods that make your symptoms worse. These might include milk, alcohol, or spicy foods. · Make sure to get enough iron. Rectal bleeding may make you lose iron. Foods with a lot of iron include beef, lentils, spinach, and raisins. They also include iron-enriched breads and cereals.   · Take any nutrition supplements that your doctor prescribes. · This disease can affect all parts of your life. Get support from friends and family. You may also want to get some counseling. When should you call for help? Call 911 anytime you think you may need emergency care. For example, call if:  · You have severe belly pain. · You passed out (lost consciousness). Call your doctor now or seek immediate medical care if:  · You have signs of needing more fluids. You have sunken eyes and a dry mouth, and you pass only a little dark urine. · You have a fever or shaking chills. · Your belly is bloated. Watch closely for changes in your health, and be sure to contact your doctor if:  · Your symptoms get worse. · You pass new bloody stools, or the bloody stools are worse. · You have diarrhea for more than 2 weeks. · You have unexplained weight loss. Where can you learn more? Go to http://obinna-valentín.info/. Enter T450 in the search box to learn more about \"Ulcerative Colitis: Care Instructions. \"  Current as of: August 9, 2016  Content Version: 11.1  © 8922-4569 [employer]. Care instructions adapted under license by [employer] (which disclaims liability or warranty for this information). If you have questions about a medical condition or this instruction, always ask your healthcare professional. [address] any warranty or liability for your use of this information.

## 2017-01-19 NOTE — PROGRESS NOTES
Dual skin assessment with Irina Antoine RN, is negative with exception of scratch L hand/wrist, per pt, \"scratched by my dog. \"

## 2017-01-20 ENCOUNTER — ANESTHESIA EVENT (OUTPATIENT)
Dept: ENDOSCOPY | Age: 47
DRG: 372 | End: 2017-01-20
Payer: COMMERCIAL

## 2017-01-20 ENCOUNTER — ANESTHESIA (OUTPATIENT)
Dept: ENDOSCOPY | Age: 47
DRG: 372 | End: 2017-01-20
Payer: COMMERCIAL

## 2017-01-20 ENCOUNTER — SURGERY (OUTPATIENT)
Age: 47
End: 2017-01-20

## 2017-01-20 LAB
ABO + RH BLD: NORMAL
ALBUMIN SERPL BCP-MCNC: 1.7 G/DL (ref 3.5–5)
ALBUMIN/GLOB SERPL: 0.5 {RATIO} (ref 1.2–3.5)
ALP SERPL-CCNC: 52 U/L (ref 50–136)
ALT SERPL-CCNC: 10 U/L (ref 12–65)
ANION GAP BLD CALC-SCNC: 11 MMOL/L (ref 7–16)
AST SERPL W P-5'-P-CCNC: 7 U/L (ref 15–37)
BASOPHILS # BLD AUTO: 0 K/UL (ref 0–0.2)
BASOPHILS # BLD: 0 % (ref 0–2)
BILIRUB SERPL-MCNC: 0.4 MG/DL (ref 0.2–1.1)
BLD PROD TYP BPU: NORMAL
BLOOD GROUP ANTIBODIES SERPL: NORMAL
BPU ID: NORMAL
BUN SERPL-MCNC: 7 MG/DL (ref 6–23)
CALCIUM SERPL-MCNC: 7.5 MG/DL (ref 8.3–10.4)
CHLORIDE SERPL-SCNC: 108 MMOL/L (ref 98–107)
CO2 SERPL-SCNC: 26 MMOL/L (ref 21–32)
CREAT SERPL-MCNC: 0.66 MG/DL (ref 0.8–1.5)
CROSSMATCH RESULT,%XM: NORMAL
DIFFERENTIAL METHOD BLD: ABNORMAL
EOSINOPHIL # BLD: 0 K/UL (ref 0–0.8)
EOSINOPHIL NFR BLD: 0 % (ref 0.5–7.8)
ERYTHROCYTE [DISTWIDTH] IN BLOOD BY AUTOMATED COUNT: 19.7 % (ref 11.9–14.6)
GLOBULIN SER CALC-MCNC: 3.1 G/DL (ref 2.3–3.5)
GLUCOSE SERPL-MCNC: 126 MG/DL (ref 65–100)
HCT VFR BLD AUTO: 26.5 % (ref 41.1–50.3)
HCT VFR BLD AUTO: 26.7 % (ref 41.1–50.3)
HCT VFR BLD AUTO: 26.9 % (ref 41.1–50.3)
HCT VFR BLD AUTO: 27.8 % (ref 41.1–50.3)
HGB BLD-MCNC: 8.3 G/DL (ref 13.6–17.2)
HGB BLD-MCNC: 8.5 G/DL (ref 13.6–17.2)
HGB BLD-MCNC: 8.6 G/DL (ref 13.6–17.2)
HGB BLD-MCNC: 8.8 G/DL (ref 13.6–17.2)
IMM GRANULOCYTES # BLD: 0.1 K/UL (ref 0–0.5)
IMM GRANULOCYTES NFR BLD AUTO: 0.5 % (ref 0–5)
LYMPHOCYTES # BLD AUTO: 3 % (ref 13–44)
LYMPHOCYTES # BLD: 0.4 K/UL (ref 0.5–4.6)
MCH RBC QN AUTO: 26.3 PG (ref 26.1–32.9)
MCHC RBC AUTO-ENTMCNC: 31.3 G/DL (ref 31.4–35)
MCV RBC AUTO: 84.1 FL (ref 79.6–97.8)
MONOCYTES # BLD: 0.8 K/UL (ref 0.1–1.3)
MONOCYTES NFR BLD AUTO: 6 % (ref 4–12)
NEUTS SEG # BLD: 12 K/UL (ref 1.7–8.2)
NEUTS SEG NFR BLD AUTO: 91 % (ref 43–78)
PLATELET # BLD AUTO: 450 K/UL (ref 150–450)
PMV BLD AUTO: 8.9 FL (ref 10.8–14.1)
POTASSIUM SERPL-SCNC: 3.6 MMOL/L (ref 3.5–5.1)
PROT SERPL-MCNC: 4.8 G/DL (ref 6.3–8.2)
RBC # BLD AUTO: 3.15 M/UL (ref 4.23–5.67)
SODIUM SERPL-SCNC: 145 MMOL/L (ref 136–145)
SPECIMEN EXP DATE BLD: NORMAL
STATUS OF UNIT,%ST: NORMAL
UNIT DIVISION, %UDIV: 0
WBC # BLD AUTO: 13.3 K/UL (ref 4.3–11.1)

## 2017-01-20 PROCEDURE — 74011250636 HC RX REV CODE- 250/636: Performed by: INTERNAL MEDICINE

## 2017-01-20 PROCEDURE — 74011250636 HC RX REV CODE- 250/636: Performed by: NURSE PRACTITIONER

## 2017-01-20 PROCEDURE — 74011000250 HC RX REV CODE- 250: Performed by: NURSE PRACTITIONER

## 2017-01-20 PROCEDURE — 74011250637 HC RX REV CODE- 250/637: Performed by: INTERNAL MEDICINE

## 2017-01-20 PROCEDURE — 85025 COMPLETE CBC W/AUTO DIFF WBC: CPT | Performed by: INTERNAL MEDICINE

## 2017-01-20 PROCEDURE — 36415 COLL VENOUS BLD VENIPUNCTURE: CPT | Performed by: INTERNAL MEDICINE

## 2017-01-20 PROCEDURE — 74011250636 HC RX REV CODE- 250/636: Performed by: ANESTHESIOLOGY

## 2017-01-20 PROCEDURE — 74011250637 HC RX REV CODE- 250/637: Performed by: NURSE PRACTITIONER

## 2017-01-20 PROCEDURE — 65270000029 HC RM PRIVATE

## 2017-01-20 PROCEDURE — 85018 HEMOGLOBIN: CPT | Performed by: INTERNAL MEDICINE

## 2017-01-20 PROCEDURE — 74011000258 HC RX REV CODE- 258: Performed by: INTERNAL MEDICINE

## 2017-01-20 PROCEDURE — 80053 COMPREHEN METABOLIC PANEL: CPT | Performed by: INTERNAL MEDICINE

## 2017-01-20 PROCEDURE — 76060000031 HC ANESTHESIA FIRST 0.5 HR: Performed by: INTERNAL MEDICINE

## 2017-01-20 PROCEDURE — 76040000025: Performed by: INTERNAL MEDICINE

## 2017-01-20 PROCEDURE — 0DJD8ZZ INSPECTION OF LOWER INTESTINAL TRACT, VIA NATURAL OR ARTIFICIAL OPENING ENDOSCOPIC: ICD-10-PCS | Performed by: INTERNAL MEDICINE

## 2017-01-20 PROCEDURE — 74011250636 HC RX REV CODE- 250/636

## 2017-01-20 RX ORDER — PROPOFOL 10 MG/ML
INJECTION, EMULSION INTRAVENOUS
Status: DISCONTINUED | OUTPATIENT
Start: 2017-01-20 | End: 2017-01-20 | Stop reason: HOSPADM

## 2017-01-20 RX ORDER — PROPOFOL 10 MG/ML
INJECTION, EMULSION INTRAVENOUS
Status: ACTIVE
Start: 2017-01-20 | End: 2017-01-21

## 2017-01-20 RX ORDER — SODIUM CHLORIDE 0.9 % (FLUSH) 0.9 %
5-10 SYRINGE (ML) INJECTION AS NEEDED
Status: CANCELLED | OUTPATIENT
Start: 2017-01-20

## 2017-01-20 RX ORDER — SODIUM CHLORIDE, SODIUM LACTATE, POTASSIUM CHLORIDE, CALCIUM CHLORIDE 600; 310; 30; 20 MG/100ML; MG/100ML; MG/100ML; MG/100ML
100 INJECTION, SOLUTION INTRAVENOUS CONTINUOUS
Status: CANCELLED | OUTPATIENT
Start: 2017-01-20

## 2017-01-20 RX ORDER — SODIUM CHLORIDE, SODIUM LACTATE, POTASSIUM CHLORIDE, CALCIUM CHLORIDE 600; 310; 30; 20 MG/100ML; MG/100ML; MG/100ML; MG/100ML
100 INJECTION, SOLUTION INTRAVENOUS CONTINUOUS
Status: DISCONTINUED | OUTPATIENT
Start: 2017-01-20 | End: 2017-01-27

## 2017-01-20 RX ORDER — PROPOFOL 10 MG/ML
INJECTION, EMULSION INTRAVENOUS AS NEEDED
Status: DISCONTINUED | OUTPATIENT
Start: 2017-01-20 | End: 2017-01-20 | Stop reason: HOSPADM

## 2017-01-20 RX ADMIN — METHYLPREDNISOLONE SODIUM SUCCINATE 40 MG: 40 INJECTION, POWDER, FOR SOLUTION INTRAMUSCULAR; INTRAVENOUS at 08:03

## 2017-01-20 RX ADMIN — MESALAMINE 2400 MG: 1.2 TABLET, DELAYED RELEASE ORAL at 08:04

## 2017-01-20 RX ADMIN — ZOLPIDEM TARTRATE 5 MG: 5 TABLET, FILM COATED ORAL at 00:17

## 2017-01-20 RX ADMIN — PROPOFOL 40 MG: 10 INJECTION, EMULSION INTRAVENOUS at 18:33

## 2017-01-20 RX ADMIN — Medication 10 ML: at 05:41

## 2017-01-20 RX ADMIN — METHYLPREDNISOLONE SODIUM SUCCINATE 40 MG: 40 INJECTION, POWDER, FOR SOLUTION INTRAMUSCULAR; INTRAVENOUS at 22:43

## 2017-01-20 RX ADMIN — VANCOMYCIN HYDROCHLORIDE 125 MG: 1 INJECTION, POWDER, LYOPHILIZED, FOR SOLUTION INTRAVENOUS at 05:38

## 2017-01-20 RX ADMIN — PIPERACILLIN SODIUM,TAZOBACTAM SODIUM 3.38 G: 3; .375 INJECTION, POWDER, FOR SOLUTION INTRAVENOUS at 08:03

## 2017-01-20 RX ADMIN — METRONIDAZOLE 500 MG: 500 INJECTION, SOLUTION INTRAVENOUS at 05:38

## 2017-01-20 RX ADMIN — PIPERACILLIN SODIUM,TAZOBACTAM SODIUM 3.38 G: 3; .375 INJECTION, POWDER, FOR SOLUTION INTRAVENOUS at 00:13

## 2017-01-20 RX ADMIN — HYDROMORPHONE HYDROCHLORIDE 1 MG: 1 INJECTION, SOLUTION INTRAMUSCULAR; INTRAVENOUS; SUBCUTANEOUS at 19:52

## 2017-01-20 RX ADMIN — METRONIDAZOLE 500 MG: 500 INJECTION, SOLUTION INTRAVENOUS at 14:58

## 2017-01-20 RX ADMIN — PROPOFOL 50 MG: 10 INJECTION, EMULSION INTRAVENOUS at 18:38

## 2017-01-20 RX ADMIN — SODIUM PHOSPHATE 118 ML: 7; 19 ENEMA RECTAL at 09:30

## 2017-01-20 RX ADMIN — PROPOFOL 160 MCG/KG/MIN: 10 INJECTION, EMULSION INTRAVENOUS at 18:33

## 2017-01-20 RX ADMIN — ZOLPIDEM TARTRATE 5 MG: 5 TABLET, FILM COATED ORAL at 22:57

## 2017-01-20 RX ADMIN — METRONIDAZOLE 500 MG: 500 INJECTION, SOLUTION INTRAVENOUS at 22:49

## 2017-01-20 RX ADMIN — ACETAMINOPHEN 650 MG: 325 TABLET, FILM COATED ORAL at 23:59

## 2017-01-20 RX ADMIN — PROPOFOL 40 MG: 10 INJECTION, EMULSION INTRAVENOUS at 18:36

## 2017-01-20 RX ADMIN — HYDROMORPHONE HYDROCHLORIDE 1 MG: 1 INJECTION, SOLUTION INTRAMUSCULAR; INTRAVENOUS; SUBCUTANEOUS at 08:23

## 2017-01-20 RX ADMIN — VANCOMYCIN HYDROCHLORIDE 125 MG: 1 INJECTION, POWDER, LYOPHILIZED, FOR SOLUTION INTRAVENOUS at 00:13

## 2017-01-20 RX ADMIN — FOLIC ACID 1 MG: 1 TABLET ORAL at 08:03

## 2017-01-20 RX ADMIN — MESALAMINE 2400 MG: 1.2 TABLET, DELAYED RELEASE ORAL at 22:53

## 2017-01-20 RX ADMIN — Medication 10 ML: at 14:00

## 2017-01-20 RX ADMIN — PIPERACILLIN SODIUM,TAZOBACTAM SODIUM 3.38 G: 3; .375 INJECTION, POWDER, FOR SOLUTION INTRAVENOUS at 22:52

## 2017-01-20 RX ADMIN — Medication 10 ML: at 22:54

## 2017-01-20 RX ADMIN — VANCOMYCIN HYDROCHLORIDE 125 MG: 1 INJECTION, POWDER, LYOPHILIZED, FOR SOLUTION INTRAVENOUS at 13:20

## 2017-01-20 RX ADMIN — SODIUM CHLORIDE, SODIUM LACTATE, POTASSIUM CHLORIDE, AND CALCIUM CHLORIDE 100 ML/HR: 600; 310; 30; 20 INJECTION, SOLUTION INTRAVENOUS at 17:12

## 2017-01-20 NOTE — INTERVAL H&P NOTE
H&P Update: Deonna Chung was seen and examined. History and physical has been reviewed. The patient has been examined.  There have been no significant clinical changes since the completion of the originally dated History and Physical.    Signed By: Martin Patrick MD     January 20, 2017 6:29 PM

## 2017-01-20 NOTE — PROGRESS NOTES
TRANSFER - OUT REPORT:    Verbal report given to Sonali Miller (name) on Deanna Primrose  being transferred to [facility](unit) for routine progression of care       Report consisted of patients Situation, Background, Assessment and   Recommendations(SBAR). Information from the following report(s) SBAR, Procedure Summary, MAR and Recent Results was reviewed with the receiving nurse. Lines:   Peripheral IV 01/18/17 Left Forearm (Active)   Site Assessment Clean, dry, & intact 1/20/2017  7:31 AM   Phlebitis Assessment 0 1/20/2017  7:31 AM   Infiltration Assessment 0 1/20/2017  7:31 AM   Dressing Status Clean;Dry; Intact 1/20/2017  7:31 AM   Dressing Type Tape;Transparent 1/20/2017  7:31 AM   Hub Color/Line Status Flushed 1/20/2017  7:31 AM       Peripheral IV 01/18/17 Right Hand (Active)   Site Assessment Clean, dry, & intact 1/20/2017  7:31 AM   Phlebitis Assessment 0 1/20/2017  7:31 AM   Infiltration Assessment 0 1/20/2017  7:31 AM   Dressing Status Clean, dry, & intact 1/20/2017  7:31 AM   Dressing Type Tape;Transparent 1/20/2017  7:31 AM   Hub Color/Line Status Infusing 1/20/2017  7:31 AM        Opportunity for questions and clarification was provided.       Patient transported with:   [employer]

## 2017-01-20 NOTE — PROGRESS NOTES
TRANSFER - IN REPORT:    Verbal report received from Mary Jane(name) on [address]  being received from 636(unit) for routine progression of care      Report consisted of patients Situation, Background, Assessment and   Recommendations(SBAR). Information from the following report(s) Kardex was reviewed with the receiving nurse. Opportunity for questions and clarification was provided. Assessment completed upon patients arrival to unit and care assumed.

## 2017-01-20 NOTE — PROCEDURES
FLEXIBLE SIGMOIDOSCOPY    DATE of PROCEDURE: 1/20/2017    INDICATION:  1. Rectal bleeding  2. Ulcerative colitis  3. C.difficile    POSTPROCEDURE DIAGNOSIS:  1. Colitis    MEDICATION:   MAC. Further details per anesthesia note. INSTRUMENT: [id number]      PROCEDURE: After obtaining informed consent, the patient was placed in the left lateral position and sedated. The endoscope was advanced to mid-transverse colon. On withdrawal, the colon was visualized carefully and in a 360 degree fashion. The patient was taken to the recovery area in stable condition. FINDINGS:  Rectum: Moderate to severe proctitis with white exudate. Sigmoid: Moderate to severe proctitis with white exudate. Descending Colon: Moderate to severe proctitis with white exudate. Transverse Colon: Moderate to severe proctitis with white exudate. Mucosa with white exudate and few areas with possible pseudomembrane appearance. However, likely background of active UC as well. Estimated blood loss: 0-minimal   Specimens obtained during procedure: none    PLAN:  1. Continue C.difficile treatment  2. Continue Mesalamine  3. Prednisone dosing per rounding team  4. Stop Zosyn as soon as it is feasible to do so.   5. Further recommendations per GI rounding team    Candelario Ward MD  [facility] [place]

## 2017-01-20 NOTE — PROGRESS NOTES
GI DAILY PROGRESS NOTE    Admit Date:  1/18/2017    Today's Date:  1/20/2017    CC:  UC, C Diff, Diarrhea, Anemia    Subjective:     Patient reports continued loose stools with BRRB with fewer episodes of diarrhea. Continued moderate abd pain, may be slightly better. Some nausea, relieved with Phenergan. Medications:   Current Facility-Administered Medications   Medication Dose Route Frequency    vancomycin 50 mg/mL oral solution (compounded) 125 mg  125 mg Oral Q6H    0.9% sodium chloride infusion 250 mL  250 mL IntraVENous PRN    metroNIDAZOLE (FLAGYL) IVPB premix 500 mg  500 mg IntraVENous Q8H    mesalamine (LIALDA) delayed release tablet 2,400 mg- PATIENT SUPPLIED  2.4 g Oral BID WITH MEALS    folic acid (FOLVITE) tablet 1 mg  1 mg Oral DAILY    promethazine (PHENERGAN) tablet 12.5 mg  12.5 mg Oral Q6H PRN    sodium chloride (NS) flush 5-10 mL  5-10 mL IntraVENous Q8H    sodium chloride (NS) flush 5-10 mL  5-10 mL IntraVENous PRN    ondansetron (ZOFRAN) injection 4 mg  4 mg IntraVENous Q4H PRN    zolpidem (AMBIEN) tablet 5 mg  5 mg Oral QHS PRN    0.9% sodium chloride with KCl 20 mEq/L infusion   IntraVENous CONTINUOUS    HYDROmorphone (PF) (DILAUDID) injection 1 mg  1 mg IntraVENous Q6H PRN    piperacillin-tazobactam (ZOSYN) 3.375 g in 0.9% sodium chloride (MBP/ADV) 100 mL  3.375 g IntraVENous Q8H    acetaminophen (TYLENOL) tablet 650 mg  650 mg Oral Q4H PRN    methylPREDNISolone (PF) (SOLU-MEDROL) injection 40 mg  40 mg IntraVENous Q12H       Review of Systems:  ROS was obtained, with pertinent positives as listed above. No chest pain or SOB.     Diet:  Clear Liquid    Objective:   Vitals:  Visit Vitals    /71    Pulse 75    Temp 97.6 °F (36.4 °C)    Resp 18    Ht 5' 9\" (1.753 m)    Wt 94 kg (207 lb 3.2 oz)    SpO2 96%    BMI 30.6 kg/m2     Intake/Output:     01/18 1901 - 01/20 0700  In: 2274.8 [P.O.:360; I.V.:1597]  Out: -   Exam:  General appearance: alert, cooperative, no distress  Lungs: clear to auscultation bilaterally anteriorly  Heart: regular rate and rhythm  Abdomen: soft, mod ttp over entire abd. Bowel sounds normal. No masses, no organomegaly  Extremities: extremities normal, atraumatic, no cyanosis or edema  Neuro:  alert and oriented    Data Review (Labs):    Recent Labs      01/20/17   0603  01/20/17   0155  01/19/17   1436  01/19/17   0815  01/19/17   0045  01/18/17   1400  01/18/17   1007   WBC  13.3*   --    --    --   18.0*   --   26.8*   HGB  8.3*  8.8*  7.8*  7.7*  7.9*  6.5*  7.0*   HCT  26.5*  27.8*  25.1*  25.1*  26.0*  21.5*  23.8*   PLT  450   --    --    --   389   --   539*   MCV  84.1   --    --    --   81.8   --   82.9   NA  145   --    --    --   143   --   144   K  3.6   --    --    --   3.6   --   3.0*   CL  108*   --    --    --   110*   --   109*   CO2  26   --    --    --   24   --   25   BUN  7   --    --    --   9   --   9   CREA  0.66*   --    --    --   0.61*   --   1.14   CA  7.5*   --    --    --   7.6*   --   7.6*   GLU  126*   --    --    --   89   --   69   AP  52   --    --    --   50   --   52   SGOT  7*   --    --    --   10*   --   7*   ALT  10*   --    --    --   12   --   9*   TBILI  0.4   --    --    --   0.3   --   0.3   ALB  1.7*   --    --    --   1.8*   --   2.0*   TP  4.8*   --    --    --   4.8*   --   5.4*   PTP   --    --    --    --    --   13.6*   --    INR   --    --    --    --    --   1.2   --      KUB 1/18/17  IMPRESSION: Nonspecific bowel gas pattern appreciated. There are prominent small  bowel loops in the left upper abdominal quadrant and a paucity of air distally. No definite acute abnormality.       Assessment:     Active Problems:    Rectal bleed (12/1/2016)      Abdominal pain (12/1/2016)      Fever (1/12/2017)      Acute blood loss anemia (1/18/2017)      Ulcerative colitis ([facility] [address] (1/18/2017)      Clostridium difficile diarrhea (1/18/2017)      41 yo male who is being admitted due to hypotension, increased diarrhea with C.diff and UC, ABLA with drop in HGB from 8.4 to 7.0 overnight. He was started on Remicade therapy during a previous hospitalization on 12/9/16, and received the second dose on 12/22/16. He is due for 3rd dose on 1/19/17, which we are holding due to C.diff. Our office is aware. Continues to have bloody diarrhea with no significant rise in hgb despite 2 U PRBC, now hgb 7.7. KUB w nonspecific bowel gas pattern. Bloody diarrhea could be active inflammation from UC or incompletely treated or flagyl resistent C Diff  Plan:      1. Monitor HGB transfuse for hgb < 8.0  2. Continue IV Flagyl (day 8) continue oral vancomycin (day 2) for C Diff positive 1/12/17  3. Continue solumedrol IV  4. Continue Lialda home dose  5. Continue antiemetics and pain control  6. Continue clear Liquid Diet  7. Urine and blood cultures pending   8. Zosyn IV for broad spectrum coverage until cultures return. 9. TPMP Genetics pending from prior admission. (drawn 1/14/17)  10 Flex sig today to evaluate LGIB     [facility] NP  Patient is seen and examined in collaboration with Dr. Lakeisha Tolbert. Assessment and plan as per Dr. Aliyah Clemente. Agree with plans as above.   Mariely Conley MD

## 2017-01-20 NOTE — ANESTHESIA PREPROCEDURE EVALUATION
Anesthetic History               Review of Systems / Medical History  Patient summary reviewed, nursing notes reviewed and pertinent labs reviewed    Pulmonary                   Neuro/Psych              Cardiovascular                  Exercise tolerance: >4 METS     GI/Hepatic/Renal               Comments: Exacerbation of chronic ulcerative colitis Endo/Other             Other Findings              Physical Exam    Airway  Mallampati: I  TM Distance: > 6 cm  Neck ROM: normal range of motion   Mouth opening: Normal     Cardiovascular  Regular rate and rhythm,  S1 and S2 normal,  no murmur, click, rub, or gallop             Dental         Pulmonary  Breath sounds clear to auscultation               Abdominal  GI exam deferred       Other Findings            Anesthetic Plan    ASA: 2  Anesthesia type: total IV anesthesia            Anesthetic plan and risks discussed with: Patient

## 2017-01-20 NOTE — PROGRESS NOTES
PLAN:  Care Management per GI  --Monitor HGB transfuse for hgb < 8.0  --Continue IV Flagyl (day 8) continue oral vancomycin (day 2) for C Diff positive 1/12/17  --Continue solumedrol IV  --Continue Lialda home dose  --Continue antiemetics and pain control  --Continue clear Liquid Diet  --Urine and blood cultures pending   --Zosyn IV for broad spectrum coverage until cultures return. --TPMP Genetics pending from prior admission. (drawn 1/14/17)  --Flex sig today to evaluate LGIB  General Surgery  --Hemorrhoids without thrombosis or bleeding or prolapse. --Recommend follow up as outpatient for possible outpatient surgery. --Ulcerative Colitis diagnosis treatment and recommendations per Gastroenterology team.  --Patient likely needs colonoscopy or flex sig to determine source for bleeding. --Will follow for endoscopy findings.       ASSESSMENT:  Admit Date: 1/18/2017   * No surgery date entered *  Procedure(s) with comments:  SIGMOIDOSCOPY FLEXIBLE - Flex. Sig. Room 636    Active Problems:    Rectal bleed (12/1/2016)      Abdominal pain (12/1/2016)      Fever (1/12/2017)      Acute blood loss anemia (1/18/2017)      Ulcerative colitis ([facility] [address] (1/18/2017)      Clostridium difficile diarrhea (1/18/2017)         SUBJECTIVE:  Awake in bed. States he went to bathroom and feels like external hemorrhoids are present now. Pt examined by Dr. Blanca Aldridge - no external hemorrhoids present. Pt to have Flex sig today to evaluate LGIB. AF, VSS. OBJECTIVE:  Constitutional: Alert, oriented, cooperative, no acute distress; appears stated age   Visit Vitals    /82    Pulse 79    Temp 98 °F (36.7 °C)    Resp 20    Ht 5' 9\" (1.753 m)    Wt 207 lb 3.2 oz (94 kg)    SpO2 95%    BMI 30.6 kg/m2     Eyes:Sclera are clear. ENMT: no external lesions gross hearing normal; no obvious neck masses, no ear or lip lesions  CV: RRR. Normal perfusion  Resp: No JVD. Breathing is  non-labored; no audible wheezing.     GI: soft, mod generalized ttp over entire abd. Bowel sounds active    Musculoskeletal: unremarkable with normal function. No embolic signs or cyanosis. Neuro:  AOx3; moves all 4; no focal deficits  Psychiatric: normal affect and mood, no memory impairment      Patient Vitals for the past 24 hrs:   BP Temp Pulse Resp SpO2   01/20/17 1519 144/82 98 °F (36.7 °C) 79 20 95 %   01/20/17 1116 127/73 97.9 °F (36.6 °C) 72 18 96 %   01/20/17 0735 125/71 97.6 °F (36.4 °C) 75 18 96 %   01/20/17 0434 131/78 98.7 °F (37.1 °C) 73 18 97 %   01/20/17 0050 122/65 98 °F (36.7 °C) 78 18 94 %   01/20/17 0011 124/74 98.9 °F (37.2 °C) 74 18 95 %   01/19/17 2305 123/64 98.7 °F (37.1 °C) 78 18 95 %   01/19/17 2204 122/71 98.2 °F (36.8 °C) 78 18 96 %   01/19/17 2149 132/67 98.4 °F (36.9 °C) 79 18 98 %   01/19/17 1952 115/59 98.9 °F (37.2 °C) 79 18 96 %   01/19/17 1747 137/69 98.1 °F (36.7 °C) 83 18 -   01/19/17 1644 129/72 98.1 °F (36.7 °C) 75 18 -     Labs:  Recent Labs      01/20/17   1316  01/20/17   0603   01/18/17   1400   WBC   --   13.3*   < >   --    HGB  8.5*  8.3*   < >  6.5*   PLT   --   450   < >   --    NA   --   145   < >   --    K   --   3.6   < >   --    CL   --   108*   < >   --    CO2   --   26   < >   --    BUN   --   7   < >   --    CREA   --   0.66*   < >   --    GLU   --   126*   < >   --    PTP   --    --    --   13.6*   INR   --    --    --   1.2   TBILI   --   0.4   < >   --    SGOT   --   7*   < >   --    ALT   --   10*   < >   --    AP   --   52   < >   --     < > = values in this interval not displayed. MARCELA AdamsP-BC    The patient was seen in conjunction with Dr. Emilie Russell who independently evaluated the patient, reviewed the chart and agreed with the assessment and plan.

## 2017-01-20 NOTE — CONSULTS
[address]  [address], [address] [facility], [address] [facility]       History and Physical/Surgical Consult   Celina Hedrick    Admit date: 2017    MRN: [id number]     : 1970     Age: 40 y.o.          2017 9:24 PM    Subjective/HPI:   This patient is a 40 y.o. seen and evaluated at the request of Dr. Kip Hunter for hemorrhoids. This patient has a complex history of Ulcerative Colitis and was recently discharged home yesterday after 5 days in the hospital being treated for UC. At home the patient became hypotensive and had a syncopal episode and was found to be hypotensive. The patients wife called 911 and the patient was returned to the ED and found to have a Hemoglobin of 7. The patient is receiving his 4th Unit of PRBC today and complains of continued BRBPR. While on the toilet today the patient complained of hemorrhoids and general surgery is consulted for possible bleeding hemorrhoids. The latest flexible sigmoidoscopy was 16 by Dr. Wilfred Osorio. The patient is currently hemodynamically stable. He complains of mild lower abdominal pain. He denies N/V/F. He is frustrated and hopes to find a solution for his problems soon. Review of Systems  A comprehensive review of systems was negative except for that written in the HPI. Past Medical History   Diagnosis Date    Ulcerative colitis [facility])       Past Surgical History   Procedure Laterality Date    Flexible sigmoidoscopy N/A 2016     SIGMOIDOSCOPY FLEXIBLE performed by Callie Velásquez MD at [facility] ENDOSCOPY      Allergies   Allergen Reactions    Ibuprofen Other (comments)     Colitis      Sulfa (Sulfonamide Antibiotics) Rash      Social History   Substance Use Topics    Smoking status: Never Smoker    Smokeless tobacco: Not on file    Alcohol use Yes      Social History     Social History Narrative     No family history on file.    Prior to Admission Medications   Prescriptions Last Dose Informant Patient Reported? Taking? HYDROcodone-acetaminophen (NORCO) 5-325 mg per tablet Unknown at Unknown time  Yes No   Sig: Take 1 Tab by mouth every six (6) hours as needed for Pain. INFLIXIMAB (REMICADE IV) Unknown at Unknown time  Yes No   Sig: by IntraVENous route. IRON FM,PS NO.1/FOLIC/MV BA.36 (SE-TAN PLUS PO)   Yes Yes   Sig: Take 1 Tab by mouth daily. dicyclomine (BENTYL) 10 mg capsule   Yes Yes   Sig: Take 10 mg by mouth four (4) times daily. folic acid (FOLVITE) 1 mg tablet Unknown at Unknown time  Yes No   Sig: Take 1 mg by mouth daily. hydrocortisone (ANUSOL-HC) 25 mg supp Unknown at Unknown time  No No   Sig: Insert 1 Suppository into rectum every twelve (12) hours. mesalamine (LIALDA) 1.2 gram delayed release tablet Unknown at Unknown time  Yes No   Sig: Take 2.4 g by mouth ACB/HS. metroNIDAZOLE (FLAGYL) 500 mg tablet Unknown at Unknown time  No No   Sig: Take 1 Tab by mouth every eight (8) hours for 9 days. Indications: Clostridium difficile infection   ondansetron hcl (ZOFRAN, AS HYDROCHLORIDE,) 4 mg tablet   Yes Yes   Sig: Take 4 mg by mouth every four (4) hours as needed for Nausea. pantoprazole (PROTONIX) 40 mg tablet   Yes Yes   Sig: Take 40 mg by mouth daily. predniSONE (DELTASONE) 10 mg tablet Unknown at Unknown time  No No   Sig: Take 4 tabs PO daily for 7 days, then 3 tabs PO for 7 days, then 2 tabs PO for 7 days, then 1 tab PO for 7 days. Then stop. Indications: ULCERATIVE COLITIS   promethazine (PHENERGAN) 25 mg tablet Unknown at Unknown time  No No   Sig: Take 1 Tab by mouth every eight (8) hours as needed. traMADol (ULTRAM) 50 mg tablet   Yes Yes   Sig: Take 50 mg by mouth every six (6) hours as needed for Pain.       Facility-Administered Medications: None     Current Facility-Administered Medications   Medication Dose Route Frequency    vancomycin 50 mg/mL oral solution (compounded) 125 mg  125 mg Oral Q6H    0.9% sodium chloride infusion 250 mL  250 mL IntraVENous PRN    metroNIDAZOLE (FLAGYL) IVPB premix 500 mg  500 mg IntraVENous Q8H    mesalamine (LIALDA) delayed release tablet 2,400 mg- PATIENT SUPPLIED  2.4 g Oral BID WITH MEALS    folic acid (FOLVITE) tablet 1 mg  1 mg Oral DAILY    promethazine (PHENERGAN) tablet 12.5 mg  12.5 mg Oral Q6H PRN    sodium chloride (NS) flush 5-10 mL  5-10 mL IntraVENous Q8H    sodium chloride (NS) flush 5-10 mL  5-10 mL IntraVENous PRN    ondansetron (ZOFRAN) injection 4 mg  4 mg IntraVENous Q4H PRN    zolpidem (AMBIEN) tablet 5 mg  5 mg Oral QHS PRN    0.9% sodium chloride with KCl 20 mEq/L infusion   IntraVENous CONTINUOUS    HYDROmorphone (PF) (DILAUDID) injection 1 mg  1 mg IntraVENous Q6H PRN    piperacillin-tazobactam (ZOSYN) 3.375 g in 0.9% sodium chloride (MBP/ADV) 100 mL  3.375 g IntraVENous Q8H    acetaminophen (TYLENOL) tablet 650 mg  650 mg Oral Q4H PRN    methylPREDNISolone (PF) (SOLU-MEDROL) injection 40 mg  40 mg IntraVENous Q12H     Objective:     Vitals:    01/19/17 1524 01/19/17 1644 01/19/17 1747 01/19/17 1952   BP: 130/72 129/72 137/69 115/59   Pulse: 72 75 83 79   Resp: 18 18 18 18   Temp: 98 °F (36.7 °C) 98.1 °F (36.7 °C) 98.1 °F (36.7 °C) 98.9 °F (37.2 °C)   SpO2:    96%   Weight:       Height:            01/18 0701 - 01/19 1900  In: 2040.6 [P.O.:120; I.V.:1271]  Out: -   Physical Exam:   Gen- the patient is well developed and in no acute distress  HEENT- PERRL, EOMI, no scleral icterus       nose without alar flaring or epistaxis                  oral muscosa moist without cyanosis  Neck- no JVD or retractions  Lungs- resp even/unlab   Heart- RRR   Abd- soft, positive BS, mild TTP lower abdomen. No guarding. No rebound. No peritoneal signs. Ext- warm without cyanosis. There is no lower leg edema. Skin- no jaundice or rashes  Neuro- alert and oriented x 3. No gross sensorimotor deficits are present. Rectal exam-No external hemorrhoids visualized. Normal tone. No prolapse.   No blood or bleeding. No thrombosis. No evidence for fistula. No fissure. Data Review   Recent Labs      01/19/17   1436  01/19/17   0815  01/19/17   0045   01/18/17   1007  01/17/17   0555   WBC   --    --   18.0*   --   26.8*  12.7*   HGB  7.8*  7.7*  7.9*   < >  7.0*  8.4*   HCT  25.1*  25.1*  26.0*   < >  23.8*  28.6*   PLT   --    --   389   --   539*  551*    < > = values in this interval not displayed. Recent Labs      01/19/17   0045  01/18/17   1400  01/18/17   1007  01/17/17   0555   NA  143   --   144  144   K  3.6   --   3.0*  3.3*   CL  110*   --   109*  109*   CO2  24   --   25  27   GLU  89   --   69  110*   BUN  9   --   9  7   CREA  0.61*   --   1.14  0.65*   INR   --   1.2   --    --        Assessment:     Hospital Problems  Date Reviewed: 1/18/2017          Codes Class Noted POA    Acute blood loss anemia ICD-10-CM: D62  ICD-9-CM: 285.1  1/18/2017 Yes        Ulcerative colitis ([facility] ICD-10-CM: K51.90  ICD-9-CM: 556.9  1/18/2017 Yes        Clostridium difficile diarrhea ICD-10-CM: A04.7  ICD-9-CM: 008.45  1/18/2017 Yes        Fever ICD-10-CM: R50.9  ICD-9-CM: 780.60  1/12/2017 Yes        Rectal bleed ICD-10-CM: K62.5  ICD-9-CM: 569.3  12/1/2016 Yes        Abdominal pain ICD-10-CM: R10.9  ICD-9-CM: 789.00  12/1/2016 Yes            Plan:   Hemorrhoids without thrombosis or bleeding or prolapse. Recommend follow up as outpatient for possible outpatient surgery. Ulcerative Colitis diagnosis treatment and recommendations per Gastroenterology team.  Patient likely needs colonoscopy or flex sig to determine source for bleeding. Will follow for endoscopy findings. Thank you for allowing me to participate in the care of your patient.     Anuja Roberts, DO    --    Anuja Roberts, DO

## 2017-01-20 NOTE — PROGRESS NOTES
Spouse requests that NO MEDICAL INFORMATION BE GIVEN OUT TO ANYONE ELSE. (he may have a sister to call or come by and ask for medical information per spouse, and they request that no information be shared.)  Thank you very much.

## 2017-01-21 LAB
ALBUMIN SERPL BCP-MCNC: 1.9 G/DL (ref 3.5–5)
ALBUMIN/GLOB SERPL: 0.5 {RATIO} (ref 1.2–3.5)
ALP SERPL-CCNC: 55 U/L (ref 50–136)
ALT SERPL-CCNC: 9 U/L (ref 12–65)
ANION GAP BLD CALC-SCNC: 9 MMOL/L (ref 7–16)
AST SERPL W P-5'-P-CCNC: 7 U/L (ref 15–37)
BACTERIA SPEC CULT: NORMAL
BASOPHILS # BLD AUTO: 0 K/UL (ref 0–0.2)
BASOPHILS # BLD: 0 % (ref 0–2)
BILIRUB SERPL-MCNC: 0.2 MG/DL (ref 0.2–1.1)
BUN SERPL-MCNC: 7 MG/DL (ref 6–23)
CALCIUM SERPL-MCNC: 7.9 MG/DL (ref 8.3–10.4)
CHLORIDE SERPL-SCNC: 110 MMOL/L (ref 98–107)
CO2 SERPL-SCNC: 26 MMOL/L (ref 21–32)
CREAT SERPL-MCNC: 0.6 MG/DL (ref 0.8–1.5)
DIFFERENTIAL METHOD BLD: ABNORMAL
EOSINOPHIL # BLD: 0 K/UL (ref 0–0.8)
EOSINOPHIL NFR BLD: 0 % (ref 0.5–7.8)
ERYTHROCYTE [DISTWIDTH] IN BLOOD BY AUTOMATED COUNT: 19.8 % (ref 11.9–14.6)
GLOBULIN SER CALC-MCNC: 3.5 G/DL (ref 2.3–3.5)
GLUCOSE SERPL-MCNC: 111 MG/DL (ref 65–100)
HCT VFR BLD AUTO: 26.8 % (ref 41.1–50.3)
HCT VFR BLD AUTO: 28.8 % (ref 41.1–50.3)
HGB BLD-MCNC: 8.4 G/DL (ref 13.6–17.2)
HGB BLD-MCNC: 9 G/DL (ref 13.6–17.2)
IMM GRANULOCYTES # BLD: 0.1 K/UL (ref 0–0.5)
IMM GRANULOCYTES NFR BLD AUTO: 0.5 % (ref 0–5)
LYMPHOCYTES # BLD AUTO: 10 % (ref 13–44)
LYMPHOCYTES # BLD: 1.7 K/UL (ref 0.5–4.6)
MCH RBC QN AUTO: 26.3 PG (ref 26.1–32.9)
MCHC RBC AUTO-ENTMCNC: 31.3 G/DL (ref 31.4–35)
MCV RBC AUTO: 83.8 FL (ref 79.6–97.8)
MONOCYTES # BLD: 0.2 K/UL (ref 0.1–1.3)
MONOCYTES NFR BLD AUTO: 2 % (ref 4–12)
NEUTS SEG # BLD: 14 K/UL (ref 1.7–8.2)
NEUTS SEG NFR BLD AUTO: 88 % (ref 43–78)
PLATELET # BLD AUTO: 609 K/UL (ref 150–450)
PMV BLD AUTO: 8.6 FL (ref 10.8–14.1)
POTASSIUM SERPL-SCNC: 3.6 MMOL/L (ref 3.5–5.1)
PROT SERPL-MCNC: 5.4 G/DL (ref 6.3–8.2)
RBC # BLD AUTO: 3.2 M/UL (ref 4.23–5.67)
SERVICE CMNT-IMP: NORMAL
SODIUM SERPL-SCNC: 145 MMOL/L (ref 136–145)
WBC # BLD AUTO: 15.9 K/UL (ref 4.3–11.1)

## 2017-01-21 PROCEDURE — 74011000250 HC RX REV CODE- 250: Performed by: NURSE PRACTITIONER

## 2017-01-21 PROCEDURE — 65270000029 HC RM PRIVATE

## 2017-01-21 PROCEDURE — 74011250636 HC RX REV CODE- 250/636: Performed by: INTERNAL MEDICINE

## 2017-01-21 PROCEDURE — 85025 COMPLETE CBC W/AUTO DIFF WBC: CPT | Performed by: INTERNAL MEDICINE

## 2017-01-21 PROCEDURE — 74011000258 HC RX REV CODE- 258: Performed by: INTERNAL MEDICINE

## 2017-01-21 PROCEDURE — 74011250636 HC RX REV CODE- 250/636: Performed by: NURSE PRACTITIONER

## 2017-01-21 PROCEDURE — 80053 COMPREHEN METABOLIC PANEL: CPT | Performed by: INTERNAL MEDICINE

## 2017-01-21 PROCEDURE — 36415 COLL VENOUS BLD VENIPUNCTURE: CPT | Performed by: INTERNAL MEDICINE

## 2017-01-21 PROCEDURE — 74011250637 HC RX REV CODE- 250/637: Performed by: INTERNAL MEDICINE

## 2017-01-21 PROCEDURE — 85018 HEMOGLOBIN: CPT | Performed by: NURSE PRACTITIONER

## 2017-01-21 PROCEDURE — 74011250637 HC RX REV CODE- 250/637: Performed by: NURSE PRACTITIONER

## 2017-01-21 RX ORDER — MAG HYDROX/ALUMINUM HYD/SIMETH 200-200-20
30 SUSPENSION, ORAL (FINAL DOSE FORM) ORAL
Status: DISCONTINUED | OUTPATIENT
Start: 2017-01-21 | End: 2017-02-07 | Stop reason: HOSPADM

## 2017-01-21 RX ORDER — CALCIUM CARBONATE 200(500)MG
200 TABLET,CHEWABLE ORAL
Status: DISCONTINUED | OUTPATIENT
Start: 2017-01-21 | End: 2017-02-07 | Stop reason: HOSPADM

## 2017-01-21 RX ADMIN — VANCOMYCIN HYDROCHLORIDE 125 MG: 1 INJECTION, POWDER, LYOPHILIZED, FOR SOLUTION INTRAVENOUS at 11:32

## 2017-01-21 RX ADMIN — VANCOMYCIN HYDROCHLORIDE 125 MG: 1 INJECTION, POWDER, LYOPHILIZED, FOR SOLUTION INTRAVENOUS at 05:34

## 2017-01-21 RX ADMIN — ZOLPIDEM TARTRATE 5 MG: 5 TABLET, FILM COATED ORAL at 23:48

## 2017-01-21 RX ADMIN — Medication 10 ML: at 05:35

## 2017-01-21 RX ADMIN — PIPERACILLIN SODIUM,TAZOBACTAM SODIUM 3.38 G: 3; .375 INJECTION, POWDER, FOR SOLUTION INTRAVENOUS at 07:34

## 2017-01-21 RX ADMIN — METHYLPREDNISOLONE SODIUM SUCCINATE 40 MG: 40 INJECTION, POWDER, FOR SOLUTION INTRAMUSCULAR; INTRAVENOUS at 07:35

## 2017-01-21 RX ADMIN — METRONIDAZOLE 500 MG: 500 INJECTION, SOLUTION INTRAVENOUS at 14:26

## 2017-01-21 RX ADMIN — VANCOMYCIN HYDROCHLORIDE 125 MG: 1 INJECTION, POWDER, LYOPHILIZED, FOR SOLUTION INTRAVENOUS at 00:04

## 2017-01-21 RX ADMIN — Medication 10 ML: at 14:27

## 2017-01-21 RX ADMIN — SODIUM CHLORIDE AND POTASSIUM CHLORIDE: 9; 1.49 INJECTION, SOLUTION INTRAVENOUS at 20:38

## 2017-01-21 RX ADMIN — HYDROMORPHONE HYDROCHLORIDE 1 MG: 1 INJECTION, SOLUTION INTRAMUSCULAR; INTRAVENOUS; SUBCUTANEOUS at 07:35

## 2017-01-21 RX ADMIN — VANCOMYCIN HYDROCHLORIDE 125 MG: 1 INJECTION, POWDER, LYOPHILIZED, FOR SOLUTION INTRAVENOUS at 23:48

## 2017-01-21 RX ADMIN — MESALAMINE 2400 MG: 1.2 TABLET, DELAYED RELEASE ORAL at 18:46

## 2017-01-21 RX ADMIN — FOLIC ACID 1 MG: 1 TABLET ORAL at 07:35

## 2017-01-21 RX ADMIN — Medication 10 ML: at 22:00

## 2017-01-21 RX ADMIN — MESALAMINE 2400 MG: 1.2 TABLET, DELAYED RELEASE ORAL at 07:37

## 2017-01-21 RX ADMIN — HYDROMORPHONE HYDROCHLORIDE 1 MG: 1 INJECTION, SOLUTION INTRAMUSCULAR; INTRAVENOUS; SUBCUTANEOUS at 18:39

## 2017-01-21 RX ADMIN — METHYLPREDNISOLONE SODIUM SUCCINATE 40 MG: 40 INJECTION, POWDER, FOR SOLUTION INTRAMUSCULAR; INTRAVENOUS at 20:38

## 2017-01-21 RX ADMIN — METRONIDAZOLE 500 MG: 500 INJECTION, SOLUTION INTRAVENOUS at 05:34

## 2017-01-21 RX ADMIN — METRONIDAZOLE 500 MG: 500 INJECTION, SOLUTION INTRAVENOUS at 22:19

## 2017-01-21 RX ADMIN — SODIUM CHLORIDE AND POTASSIUM CHLORIDE: 9; 1.49 INJECTION, SOLUTION INTRAVENOUS at 07:45

## 2017-01-21 RX ADMIN — VANCOMYCIN HYDROCHLORIDE 125 MG: 1 INJECTION, POWDER, LYOPHILIZED, FOR SOLUTION INTRAVENOUS at 17:39

## 2017-01-21 NOTE — ANESTHESIA POSTPROCEDURE EVALUATION
Post-Anesthesia Evaluation and Assessment    Patient: Macy Naik MRN: [id number]  SSN: [id number]    YOB: 1970  Age: 40 y.o. Sex: male       Cardiovascular Function/Vital Signs  Visit Vitals    /73    Pulse 78    Temp 36.7 °C (98 °F)    Resp 18    Ht 5' 9\" (1.753 m)    Wt 94 kg (207 lb 3.2 oz)    SpO2 99%    BMI 30.6 kg/m2       Patient is status post total IV anesthesia anesthesia for Procedure(s):  SIGMOIDOSCOPY FLEXIBLE. Nausea/Vomiting: None    Postoperative hydration reviewed and adequate. Pain:  Pain Scale 1: Visual (01/20/17 1850)  Pain Intensity 1: 0 (01/20/17 1850)   Managed    Neurological Status: At baseline    Mental Status and Level of Consciousness: Arousable    Pulmonary Status:   O2 Device: Nasal cannula (01/20/17 1850)   Adequate oxygenation and airway patent    Complications related to anesthesia: None    Post-anesthesia assessment completed.  No concerns    Signed By: Mariella Lopez MD     January 20, 2017

## 2017-01-21 NOTE — PROGRESS NOTES
[address]  [address] [facility], [address] [place] [address], [address]      PLAN:Continue UC treatment per GI  No evidence for bleeding hemorrhoids- will sign off      ASSESSMENT:  Admit Date: 1/18/2017   1 Day Post-Op  Procedure(s) with comments:  SIGMOIDOSCOPY FLEXIBLE - Flex. Sig. Room 636    Active Problems:    Rectal bleed (12/1/2016)      Abdominal pain (12/1/2016)      Fever (1/12/2017)      Acute blood loss anemia (1/18/2017)      Ulcerative colitis ([address] (1/18/2017)      Clostridium difficile diarrhea (1/18/2017)         SUBJECTIVE:Flex sig yesterday shows active UC. Patient complains of mild abdominal pain. Continues to have loose bloody BM. Hgb stable at 8. OBJECTIVE:  Constitutional: Alert oriented cooperative patient in no acute distress; appears stated age   Visit Vitals    /82 (BP 1 Location: Right arm, BP Patient Position: At rest)    Pulse 77    Temp 98.2 °F (36.8 °C)    Resp 18    Ht 5' 9\" (1.753 m)    Wt 207 lb 3.2 oz (94 kg)    SpO2 94%    BMI 30.6 kg/m2     Eyes:Sclera are clear. ENMT: no external lesions gross hearing normal; no obvious neck masses, no ear or lip lesions  CV: RRR. Normal perfusion  Resp: No JVD. Breathing is  non-labored; no audible wheezing. GI: soft, pos BS, no peritoneal signs. Musculoskeletal: unremarkable with normal function. No embolic signs or cyanosis.    Neuro:  Oriented; moves all 4; no focal deficits  Psychiatric: normal affect and mood, no memory impairment      Patient Vitals for the past 24 hrs:   BP Temp Pulse Resp SpO2   01/21/17 0725 138/82 98.2 °F (36.8 °C) 77 18 94 %   01/21/17 0357 125/71 97 °F (36.1 °C) 76 18 94 %   01/20/17 2324 132/62 99.2 °F (37.3 °C) (!) 102 18 97 %   01/20/17 2308 - 99 °F (37.2 °C) - - -   01/20/17 1933 136/81 98.4 °F (36.9 °C) 76 18 96 %   01/20/17 1911 129/70 - 73 18 97 %   01/20/17 1901 115/73 - 74 18 96 %   01/20/17 1850 115/73 - 78 18 99 %   01/20/17 1658 156/90 - 77 16 98 %   01/20/17 1519 144/82 98 °F (36.7 °C) 79 20 95 %   01/20/17 1116 127/73 97.9 °F (36.6 °C) 72 18 96 %     Labs:  Recent Labs      01/21/17   0740   01/18/17   1400   WBC  15.9*   < >   --    HGB  8.4*   < >  6.5*   PLT  609*   < >   --    NA  145   < >   --    K  3.6   < >   --    CL  110*   < >   --    CO2  26   < >   --    BUN  7   < >   --    CREA  0.60*   < >   --    GLU  111*   < >   --    PTP   --    --   13.6*   INR   --    --   1.2   TBILI  0.2   < >   --    SGOT  7*   < >   --    ALT  9*   < >   --    AP  55   < >   --     < > = values in this interval not displayed. Will sign off. Available if needed.     Onesimo Monisha Alejandra, DO

## 2017-01-21 NOTE — PROGRESS NOTES
TRANSFER - IN REPORT:    Verbal report received from [place] in endoscopy on [place]  being received from endoscopy for routine progression of care      Report consisted of patients Situation, Background, Assessment and   Recommendations(SBAR). Information from the following report(s) SBAR was reviewed with the receiving nurse. Screening Assessment for C Diff:     1. Three (3) or more diarrheal (liquid unformed) stools in less than 24 hours  yes     2. If yes, has patient off laxatives for more than 24 hours? YES     3. Was a stool specimen sent for C. Difficile toxin A and B? NO     4. Was the patient placed on contact isolation? YES    Opportunity for questions and clarification was provided. Assessment completed upon patients arrival to unit and care assumed.

## 2017-01-21 NOTE — PROGRESS NOTES
GI DAILY PROGRESS NOTE    Admit Date:  1/18/2017    Today's Date:  1/21/2017    CC:  Diarrhea; C. Difficile; UC    Subjective:     Patient: had a lot of gas following his procedure with more generalized abdominal pain gradually improving. He has had some GERD. Still with bloody diarrhea. 5BM since yesterday. Afebrile. Tolerating full liquid diet    Pre-procedure Diagnoses:      Rectal bleeding [K62.5]     Ulcerative colitis without complications, unspecified location ([facility] [K51.90]     C. difficile colitis [A04.7]         Post-procedure Diagnoses:     Colitis [K52.9]         Procedures:     FLEXIBLE SIGMOIDOSCOPY [[id number] (Custom)]             []Hide copied text  []Hover for attribution information  FLEXIBLE SIGMOIDOSCOPY     DATE of PROCEDURE: 1/20/2017     INDICATION:  1. Rectal bleeding  2. Ulcerative colitis  3. C.difficile     POSTPROCEDURE DIAGNOSIS:  1. Colitis     MEDICATION: MAC. Further details per anesthesia note.     INSTRUMENT: [id number]        PROCEDURE: After obtaining informed consent, the patient was placed in the left lateral position and sedated. The endoscope was advanced to mid-transverse colon. On withdrawal, the colon was visualized carefully and in a 360 degree fashion. The patient was taken to the recovery area in stable condition.     FINDINGS:  Rectum: Moderate to severe proctitis with white exudate. Sigmoid: Moderate to severe proctitis with white exudate. Descending Colon: Moderate to severe proctitis with white exudate. Transverse Colon: Moderate to severe proctitis with white exudate.      Mucosa with white exudate and few areas with possible pseudomembrane appearance. However, likely background of active UC as well.     Estimated blood loss: 0-minimal   Specimens obtained during procedure: none     PLAN:  1. Continue C.difficile treatment  2. Continue Mesalamine  3. Prednisone dosing per rounding team  4. Stop Zosyn as soon as it is feasible to do so.   5. Further recommendations per GI rounding team     Imtiaz Price MD  [facility], MD                  Medications:   Current Facility-Administered Medications   Medication Dose Route Frequency    lactated ringers infusion  100 mL/hr IntraVENous CONTINUOUS    vancomycin 50 mg/mL oral solution (compounded) 125 mg  125 mg Oral Q6H    0.9% sodium chloride infusion 250 mL  250 mL IntraVENous PRN    metroNIDAZOLE (FLAGYL) IVPB premix 500 mg  500 mg IntraVENous Q8H    mesalamine (LIALDA) delayed release tablet 2,400 mg- PATIENT SUPPLIED  2.4 g Oral BID WITH MEALS    folic acid (FOLVITE) tablet 1 mg  1 mg Oral DAILY    promethazine (PHENERGAN) tablet 12.5 mg  12.5 mg Oral Q6H PRN    sodium chloride (NS) flush 5-10 mL  5-10 mL IntraVENous Q8H    sodium chloride (NS) flush 5-10 mL  5-10 mL IntraVENous PRN    ondansetron (ZOFRAN) injection 4 mg  4 mg IntraVENous Q4H PRN    zolpidem (AMBIEN) tablet 5 mg  5 mg Oral QHS PRN    0.9% sodium chloride with KCl 20 mEq/L infusion   IntraVENous CONTINUOUS    HYDROmorphone (PF) (DILAUDID) injection 1 mg  1 mg IntraVENous Q6H PRN    piperacillin-tazobactam (ZOSYN) 3.375 g in 0.9% sodium chloride (MBP/ADV) 100 mL  3.375 g IntraVENous Q8H    acetaminophen (TYLENOL) tablet 650 mg  650 mg Oral Q4H PRN    methylPREDNISolone (PF) (SOLU-MEDROL) injection 40 mg  40 mg IntraVENous Q12H       Review of Systems:  ROS was obtained, with pertinent positives as listed above. No chest pain or SOB. Diet:      Objective:   Vitals:  Visit Vitals    /82 (BP 1 Location: Right arm, BP Patient Position: At rest)    Pulse 77    Temp 98.2 °F (36.8 °C)    Resp 18    Ht 5' 9\" (1.753 m)    Wt 94 kg (207 lb 3.2 oz)    SpO2 94%    BMI 30.6 kg/m2     Intake/Output:     01/19 1901 - 01/21 0700  In: 2920 [P.O.:240;  I.V.:1429]  Out: 0   Exam:  General appearance: alert, cooperative, no distress  Lungs: clear to auscultation bilaterally anteriorly  Heart: regular rate and rhythm  Abdomen: soft, non-tender.  Bowel sounds normal. No masses, no organomegaly  Extremities: extremities normal, atraumatic, no cyanosis or edema  Neuro:  alert and oriented    Data Review (Labs):    Recent Labs      01/21/17   0740  01/20/17   2133  01/20/17   1316  01/20/17   0603  01/20/17   0155  01/19/17   1436  01/19/17   0815  01/19/17   0045  01/18/17   1400  01/18/17   1007   WBC  15.9*   --    --   13.3*   --    --    --   18.0*   --   26.8*   HGB  8.4*  8.6*  8.5*  8.3*  8.8*  7.8*  7.7*  7.9*  6.5*  7.0*   HCT  26.8*  26.9*  26.7*  26.5*  27.8*  25.1*  25.1*  26.0*  21.5*  23.8*   PLT  609*   --    --   450   --    --    --   389   --   539*   MCV  83.8   --    --   84.1   --    --    --   81.8   --   82.9   NA  145   --    --   145   --    --    --   143   --   144   K  3.6   --    --   3.6   --    --    --   3.6   --   3.0*   CL  110*   --    --   108*   --    --    --   110*   --   109*   CO2  26   --    --   26   --    --    --   24   --   25   BUN  7   --    --   7   --    --    --   9   --   9   CREA  0.60*   --    --   0.66*   --    --    --   0.61*   --   1.14   CA  7.9*   --    --   7.5*   --    --    --   7.6*   --   7.6*   GLU  111*   --    --   126*   --    --    --   89   --   69   AP  55   --    --   52   --    --    --   50   --   52   SGOT  7*   --    --   7*   --    --    --   10*   --   7*   ALT  9*   --    --   10*   --    --    --   12   --   9*   TBILI  0.2   --    --   0.4   --    --    --   0.3   --   0.3   ALB  1.9*   --    --   1.7*   --    --    --   1.8*   --   2.0*   TP  5.4*   --    --   4.8*   --    --    --   4.8*   --   5.4*   PTP   --    --    --    --    --    --    --    --   13.6*   --    INR   --    --    --    --    --    --    --    --   1.2   --        Assessment:     Active Problems:    Rectal bleed (12/1/2016)      Abdominal pain (12/1/2016)      Fever (1/12/2017)      Acute blood loss anemia (1/18/2017)      Ulcerative colitis (H[facility]) (1/18/2017)      Clostridium difficile diarrhea (1/18/2017)    41 yo male who we admitted due to hypotension, increased diarrhea with C.diff and UC, ABLA with drop in HGB from 8.4 to 7.0 on admission1/18/17. He was started on Remicade therapy during a previous hospitalization on 12/9/16, and received the second dose on 12/22/16. He is due for 3rd dose on 1/19/17, which we are holding due to [facility] 1/12/17. CRP is elevated at 12 on 1/18/17. Our office is aware. He is currently on Lialda and Solu Medrol. He dropped his HGB 6.5 and required transfusion on 1/18/17. His most recent Hgb 1/21/17 is 8.4 which is stable (8.6) KUB w nonspecific bowel gas pattern. WBC 15.9 (13.3). Leukocytosis likely secondary to steroid therapy. Cultures for blood and urine negative on 1/18/17. He underwent Flexible sigmoidoscopy on 1/20/17 by Dr. Alyssa Peterson which revealed mucosa with white exudate and few areas with possible pseudomembrane appearance. However, likely background of active UC as well. Plan: 1. If tolerates full liquid diet, will advance to GI soft low residue  2. Stop Zosyn since negative cultures and afebrile  3. Continue Vancomycin and Flagyl  4. Change H&H to every 12 hours  5. Continue Mesalamine  6. Await results of pathology  7. Tums and Maalox PRN for indigestion, acid reflux (unable to give PPI due to C. Difficile). Wilfrid Lyon. Melani Craig in collaboration with Dr. Mildred Zhou  [facility] better. Agree with above and in addition patient is on Solumedrol.   Mildred Zhou MD

## 2017-01-22 LAB
HCT VFR BLD AUTO: 24.8 % (ref 41.1–50.3)
HCT VFR BLD AUTO: 26.8 % (ref 41.1–50.3)
HGB BLD-MCNC: 7.7 G/DL (ref 13.6–17.2)
HGB BLD-MCNC: 8.1 G/DL (ref 13.6–17.2)

## 2017-01-22 PROCEDURE — 74011250636 HC RX REV CODE- 250/636: Performed by: NURSE PRACTITIONER

## 2017-01-22 PROCEDURE — 85018 HEMOGLOBIN: CPT | Performed by: NURSE PRACTITIONER

## 2017-01-22 PROCEDURE — 74011250637 HC RX REV CODE- 250/637: Performed by: NURSE PRACTITIONER

## 2017-01-22 PROCEDURE — 74011250637 HC RX REV CODE- 250/637: Performed by: INTERNAL MEDICINE

## 2017-01-22 PROCEDURE — 74011250636 HC RX REV CODE- 250/636: Performed by: INTERNAL MEDICINE

## 2017-01-22 PROCEDURE — 36415 COLL VENOUS BLD VENIPUNCTURE: CPT | Performed by: NURSE PRACTITIONER

## 2017-01-22 PROCEDURE — 74011000250 HC RX REV CODE- 250: Performed by: NURSE PRACTITIONER

## 2017-01-22 PROCEDURE — 65270000029 HC RM PRIVATE

## 2017-01-22 RX ORDER — FAMOTIDINE 20 MG/1
20 TABLET, FILM COATED ORAL 2 TIMES DAILY
Status: DISCONTINUED | OUTPATIENT
Start: 2017-01-22 | End: 2017-01-24

## 2017-01-22 RX ORDER — DICYCLOMINE HYDROCHLORIDE 10 MG/1
10 CAPSULE ORAL
Status: DISCONTINUED | OUTPATIENT
Start: 2017-01-22 | End: 2017-02-04

## 2017-01-22 RX ADMIN — FAMOTIDINE 20 MG: 20 TABLET ORAL at 11:57

## 2017-01-22 RX ADMIN — ALUMINUM HYDROXIDE, MAGNESIUM HYDROXIDE, AND SIMETHICONE 30 ML: 200; 200; 20 SUSPENSION ORAL at 08:47

## 2017-01-22 RX ADMIN — METRONIDAZOLE 500 MG: 500 INJECTION, SOLUTION INTRAVENOUS at 05:52

## 2017-01-22 RX ADMIN — VANCOMYCIN HYDROCHLORIDE 125 MG: 1 INJECTION, POWDER, LYOPHILIZED, FOR SOLUTION INTRAVENOUS at 11:57

## 2017-01-22 RX ADMIN — METRONIDAZOLE 500 MG: 500 INJECTION, SOLUTION INTRAVENOUS at 14:55

## 2017-01-22 RX ADMIN — VANCOMYCIN HYDROCHLORIDE 125 MG: 1 INJECTION, POWDER, LYOPHILIZED, FOR SOLUTION INTRAVENOUS at 05:51

## 2017-01-22 RX ADMIN — ALUMINUM HYDROXIDE, MAGNESIUM HYDROXIDE, AND SIMETHICONE 30 ML: 200; 200; 20 SUSPENSION ORAL at 18:10

## 2017-01-22 RX ADMIN — SODIUM CHLORIDE AND POTASSIUM CHLORIDE: 9; 1.49 INJECTION, SOLUTION INTRAVENOUS at 18:07

## 2017-01-22 RX ADMIN — MESALAMINE 2400 MG: 1.2 TABLET, DELAYED RELEASE ORAL at 08:43

## 2017-01-22 RX ADMIN — Medication 10 ML: at 14:55

## 2017-01-22 RX ADMIN — ACETAMINOPHEN 650 MG: 325 TABLET, FILM COATED ORAL at 19:52

## 2017-01-22 RX ADMIN — MESALAMINE 2400 MG: 1.2 TABLET, DELAYED RELEASE ORAL at 17:08

## 2017-01-22 RX ADMIN — Medication 10 ML: at 22:05

## 2017-01-22 RX ADMIN — SODIUM CHLORIDE AND POTASSIUM CHLORIDE: 9; 1.49 INJECTION, SOLUTION INTRAVENOUS at 08:42

## 2017-01-22 RX ADMIN — Medication 10 ML: at 05:52

## 2017-01-22 RX ADMIN — METHYLPREDNISOLONE SODIUM SUCCINATE 40 MG: 40 INJECTION, POWDER, FOR SOLUTION INTRAMUSCULAR; INTRAVENOUS at 22:01

## 2017-01-22 RX ADMIN — HYDROMORPHONE HYDROCHLORIDE 1 MG: 1 INJECTION, SOLUTION INTRAMUSCULAR; INTRAVENOUS; SUBCUTANEOUS at 22:01

## 2017-01-22 RX ADMIN — FOLIC ACID 1 MG: 1 TABLET ORAL at 08:42

## 2017-01-22 RX ADMIN — FAMOTIDINE 20 MG: 20 TABLET ORAL at 17:07

## 2017-01-22 RX ADMIN — METHYLPREDNISOLONE SODIUM SUCCINATE 40 MG: 40 INJECTION, POWDER, FOR SOLUTION INTRAMUSCULAR; INTRAVENOUS at 08:42

## 2017-01-22 RX ADMIN — METRONIDAZOLE 500 MG: 500 INJECTION, SOLUTION INTRAVENOUS at 22:02

## 2017-01-22 RX ADMIN — VANCOMYCIN HYDROCHLORIDE 125 MG: 1 INJECTION, POWDER, LYOPHILIZED, FOR SOLUTION INTRAVENOUS at 18:07

## 2017-01-22 NOTE — PROGRESS NOTES
Pt OOB to shower with wife assisting. Pt continues to have abd pain. Will administer pain med when pt completes shower and is back to bed.

## 2017-01-22 NOTE — PROGRESS NOTES
Pt had uneventful shift with complaints of little or no pain. Resting comfortably in bed with wife at bedside. Will continue to monitor.

## 2017-01-22 NOTE — PROGRESS NOTES
GI DAILY PROGRESS NOTE    Admit Date:  1/18/2017    Today's Date:  1/22/2017    CC:  Diarrhea and C. Difficile; UC    Subjective:     Patient : Less BM yesterday with 5-6. No further bleeding. Having increasing GERD. Took iron at home prior to admission. Also took bentyl at home for abdominal cramping. Medications:   Current Facility-Administered Medications   Medication Dose Route Frequency    calcium carbonate (TUMS) chewable tablet 200 mg [elemental]  200 mg Oral TID PRN    alum-mag hydroxide-simeth (MYLANTA) oral suspension 30 mL  30 mL Oral Q4H PRN    lactated ringers infusion  100 mL/hr IntraVENous CONTINUOUS    vancomycin 50 mg/mL oral solution (compounded) 125 mg  125 mg Oral Q6H    0.9% sodium chloride infusion 250 mL  250 mL IntraVENous PRN    metroNIDAZOLE (FLAGYL) IVPB premix 500 mg  500 mg IntraVENous Q8H    mesalamine (LIALDA) delayed release tablet 2,400 mg- PATIENT SUPPLIED  2.4 g Oral BID WITH MEALS    folic acid (FOLVITE) tablet 1 mg  1 mg Oral DAILY    promethazine (PHENERGAN) tablet 12.5 mg  12.5 mg Oral Q6H PRN    sodium chloride (NS) flush 5-10 mL  5-10 mL IntraVENous Q8H    sodium chloride (NS) flush 5-10 mL  5-10 mL IntraVENous PRN    ondansetron (ZOFRAN) injection 4 mg  4 mg IntraVENous Q4H PRN    zolpidem (AMBIEN) tablet 5 mg  5 mg Oral QHS PRN    0.9% sodium chloride with KCl 20 mEq/L infusion   IntraVENous CONTINUOUS    HYDROmorphone (PF) (DILAUDID) injection 1 mg  1 mg IntraVENous Q6H PRN    acetaminophen (TYLENOL) tablet 650 mg  650 mg Oral Q4H PRN    methylPREDNISolone (PF) (SOLU-MEDROL) injection 40 mg  40 mg IntraVENous Q12H       Review of Systems:  ROS was obtained, with pertinent positives as listed above. No chest pain or SOB.     Diet:  Full liquid    Objective:   Vitals:  Visit Vitals    /74 (BP 1 Location: Right arm, BP Patient Position: At rest)    Pulse 79    Temp 98.4 °F (36.9 °C)    Resp 18    Ht 5' 9\" (1.753 m)    Wt 94 kg (207 lb 3.2 oz)  SpO2 96%    BMI 30.6 kg/m2     Intake/Output:     01/20 1901 - 01/22 0700  In: 2611 [P.O.:480; I.V.:2131]  Out: -   Exam:  General appearance: alert, cooperative, no distress SPOUSE AT BEDSIDE  Lungs: clear to auscultation bilaterally anteriorly  Heart: regular rate and rhythm  Abdomen: soft, LESS TENDER TODAY Bowel sounds normal. No masses, no organomegaly  Extremities: extremities normal, atraumatic, no cyanosis or edema  Neuro:  alert and oriented X4    Data Review (Labs):    Recent Labs      01/22/17   0806  01/21/17   1947  01/21/17   0740  01/20/17   2133  01/20/17   1316  01/20/17   0603  01/20/17   0155  01/19/17   1436   WBC   --    --   15.9*   --    --   13.3*   --    --    HGB  7.7*  9.0*  8.4*  8.6*  8.5*  8.3*  8.8*  7.8*   HCT  24.8*  28.8*  26.8*  26.9*  26.7*  26.5*  27.8*  25.1*   PLT   --    --   609*   --    --   450   --    --    MCV   --    --   83.8   --    --   84.1   --    --    NA   --    --   145   --    --   145   --    --    K   --    --   3.6   --    --   3.6   --    --    CL   --    --   110*   --    --   108*   --    --    CO2   --    --   26   --    --   26   --    --    BUN   --    --   7   --    --   7   --    --    CREA   --    --   0.60*   --    --   0.66*   --    --    CA   --    --   7.9*   --    --   7.5*   --    --    GLU   --    --   111*   --    --   126*   --    --    AP   --    --   55   --    --   52   --    --    SGOT   --    --   7*   --    --   7*   --    --    ALT   --    --   9*   --    --   10*   --    --    TBILI   --    --   0.2   --    --   0.4   --    --    ALB   --    --   1.9*   --    --   1.7*   --    --    TP   --    --   5.4*   --    --   4.8*   --    --        Assessment:     Active Problems:    Rectal bleed (12/1/2016)      Abdominal pain (12/1/2016)      Fever (1/12/2017)      Acute blood loss anemia (1/18/2017)      Ulcerative colitis (HCC) (1/18/2017)      Clostridium difficile diarrhea (1/18/2017)       39 yo male who we admitted due to hypotension, increased diarrhea with C.diff and UC, ABLA with drop in HGB from 8.4 to 7.0 on admission1/18/17. He was started on Remicade therapy during a previous hospitalization on 12/9/16, and received the second dose on 12/22/16. He is due for 3rd dose on 1/19/17, which we are holding due to [address] [place] 1/12/17. CRP is elevated at 12 on 1/18/17. Our office is aware. He is currently on Lialda and Solu Medrol. He dropped his HGB 6.5 and required transfusion on 1/18/17. His most recent Hgb 1/22/17 is 7.7 which is down (8.4) KUB w nonspecific bowel gas pattern. WBC 15.9 on 1/21/17  (13.3). Leukocytosis likely secondary to steroid therapy. Cultures for blood and urine negative on 1/18/17. He underwent Flexible sigmoidoscopy on 1/20/17 by Dr. Elder Woods which revealed mucosa with white exudate and few areas with possible pseudomembrane appearance. However, likely background of active UC as well.       Plan:     1. Advance to GI soft low residue diet  2. Pepcid 20mg one po bid  3. Bentyl 10mg one po QID PRN abdominal cramping  4. Continue IV solu Medrol. Hopefully, can change to po steroids tomorrow  5. Continue Vancomycin and Flagyl  (day 10 for C. Difficile)  6. Continue Mesalamine  7. Await results of pathology  8. CBC in am  [facility]. Kateryna Mccabe in collaboration with Dr. Jayden Arriola  [facility] weak. Mildly tender in the LLQ. No rebound. As noted no blood in stool. May need to transfuse soon--check hgb in am.  Remicade perfusion postponed. UC/Cdiff.   Jayden Arriola MD

## 2017-01-23 LAB
BACTERIA SPEC CULT: NORMAL
BACTERIA SPEC CULT: NORMAL
ERYTHROCYTE [DISTWIDTH] IN BLOOD BY AUTOMATED COUNT: 20.8 % (ref 11.9–14.6)
HCT VFR BLD AUTO: 23.4 % (ref 41.1–50.3)
HCT VFR BLD AUTO: 24.5 % (ref 41.1–50.3)
HGB BLD-MCNC: 7.2 G/DL (ref 13.6–17.2)
HGB BLD-MCNC: 7.4 G/DL (ref 13.6–17.2)
MCH RBC QN AUTO: 26.3 PG (ref 26.1–32.9)
MCHC RBC AUTO-ENTMCNC: 30.8 G/DL (ref 31.4–35)
MCV RBC AUTO: 85.4 FL (ref 79.6–97.8)
PLATELET # BLD AUTO: 583 K/UL (ref 150–450)
PMV BLD AUTO: 8.6 FL (ref 10.8–14.1)
RBC # BLD AUTO: 2.74 M/UL (ref 4.23–5.67)
SERVICE CMNT-IMP: NORMAL
SERVICE CMNT-IMP: NORMAL
WBC # BLD AUTO: 9.3 K/UL (ref 4.3–11.1)

## 2017-01-23 PROCEDURE — 74011250636 HC RX REV CODE- 250/636: Performed by: NURSE PRACTITIONER

## 2017-01-23 PROCEDURE — 74011250637 HC RX REV CODE- 250/637: Performed by: NURSE PRACTITIONER

## 2017-01-23 PROCEDURE — 74011250637 HC RX REV CODE- 250/637: Performed by: INTERNAL MEDICINE

## 2017-01-23 PROCEDURE — 85018 HEMOGLOBIN: CPT | Performed by: NURSE PRACTITIONER

## 2017-01-23 PROCEDURE — 36415 COLL VENOUS BLD VENIPUNCTURE: CPT | Performed by: NURSE PRACTITIONER

## 2017-01-23 PROCEDURE — 74011250636 HC RX REV CODE- 250/636: Performed by: INTERNAL MEDICINE

## 2017-01-23 PROCEDURE — 65270000029 HC RM PRIVATE

## 2017-01-23 PROCEDURE — 85027 COMPLETE CBC AUTOMATED: CPT | Performed by: NURSE PRACTITIONER

## 2017-01-23 PROCEDURE — 74011000250 HC RX REV CODE- 250: Performed by: NURSE PRACTITIONER

## 2017-01-23 RX ORDER — PREDNISONE 20 MG/1
40 TABLET ORAL
Status: DISCONTINUED | OUTPATIENT
Start: 2017-01-24 | End: 2017-01-31

## 2017-01-23 RX ORDER — METRONIDAZOLE 500 MG/1
500 TABLET ORAL
Status: DISCONTINUED | OUTPATIENT
Start: 2017-01-23 | End: 2017-01-24

## 2017-01-23 RX ADMIN — Medication 10 ML: at 14:00

## 2017-01-23 RX ADMIN — VANCOMYCIN HYDROCHLORIDE 125 MG: 1 INJECTION, POWDER, LYOPHILIZED, FOR SOLUTION INTRAVENOUS at 23:59

## 2017-01-23 RX ADMIN — FOLIC ACID 1 MG: 1 TABLET ORAL at 08:28

## 2017-01-23 RX ADMIN — METHYLPREDNISOLONE SODIUM SUCCINATE 40 MG: 40 INJECTION, POWDER, FOR SOLUTION INTRAMUSCULAR; INTRAVENOUS at 08:28

## 2017-01-23 RX ADMIN — FAMOTIDINE 20 MG: 20 TABLET ORAL at 17:09

## 2017-01-23 RX ADMIN — FAMOTIDINE 20 MG: 20 TABLET ORAL at 08:28

## 2017-01-23 RX ADMIN — VANCOMYCIN HYDROCHLORIDE 125 MG: 1 INJECTION, POWDER, LYOPHILIZED, FOR SOLUTION INTRAVENOUS at 05:48

## 2017-01-23 RX ADMIN — Medication 10 ML: at 05:49

## 2017-01-23 RX ADMIN — SODIUM CHLORIDE AND POTASSIUM CHLORIDE: 9; 1.49 INJECTION, SOLUTION INTRAVENOUS at 20:34

## 2017-01-23 RX ADMIN — MESALAMINE 2400 MG: 1.2 TABLET, DELAYED RELEASE ORAL at 17:10

## 2017-01-23 RX ADMIN — HYDROMORPHONE HYDROCHLORIDE 1 MG: 1 INJECTION, SOLUTION INTRAMUSCULAR; INTRAVENOUS; SUBCUTANEOUS at 20:34

## 2017-01-23 RX ADMIN — VANCOMYCIN HYDROCHLORIDE 125 MG: 1 INJECTION, POWDER, LYOPHILIZED, FOR SOLUTION INTRAVENOUS at 00:32

## 2017-01-23 RX ADMIN — MESALAMINE 2400 MG: 1.2 TABLET, DELAYED RELEASE ORAL at 08:27

## 2017-01-23 RX ADMIN — METRONIDAZOLE 500 MG: 500 TABLET ORAL at 17:09

## 2017-01-23 RX ADMIN — VANCOMYCIN HYDROCHLORIDE 125 MG: 1 INJECTION, POWDER, LYOPHILIZED, FOR SOLUTION INTRAVENOUS at 17:10

## 2017-01-23 RX ADMIN — METRONIDAZOLE 500 MG: 500 TABLET ORAL at 13:20

## 2017-01-23 RX ADMIN — VANCOMYCIN HYDROCHLORIDE 125 MG: 1 INJECTION, POWDER, LYOPHILIZED, FOR SOLUTION INTRAVENOUS at 13:20

## 2017-01-23 RX ADMIN — METRONIDAZOLE 500 MG: 500 INJECTION, SOLUTION INTRAVENOUS at 05:48

## 2017-01-23 NOTE — PROGRESS NOTES
GI DAILY PROGRESS NOTE    Admit Date:  1/18/2017    Today's Date:  1/23/2017    CC:  Diarrhea and C. Difficile; UC    Subjective:     Patient : Still about 5-6 BMs daily. Since advancement to GI soft diet slight improvement in consistency though still fairly loose. No further bleeding. Was having upper and lower abdominal discomfort with BM and increasing GERD. Now only lower abdominal discomfort. Bentyl helps some. Does however require IV Dilaudid as needed. Iron started during this hospitalization reportedly made pt sick. He has home iron with him today. Hgb 7.2 this AM.  Again,no further bleeding. He slept well last night. He is tolerating diet.     Medications:   Current Facility-Administered Medications   Medication Dose Route Frequency    famotidine (PEPCID) tablet 20 mg  20 mg Oral BID    dicyclomine (BENTYL) capsule 10 mg  10 mg Oral QID PRN    calcium carbonate (TUMS) chewable tablet 200 mg [elemental]  200 mg Oral TID PRN    alum-mag hydroxide-simeth (MYLANTA) oral suspension 30 mL  30 mL Oral Q4H PRN    lactated ringers infusion  100 mL/hr IntraVENous CONTINUOUS    vancomycin 50 mg/mL oral solution (compounded) 125 mg  125 mg Oral Q6H    0.9% sodium chloride infusion 250 mL  250 mL IntraVENous PRN    metroNIDAZOLE (FLAGYL) IVPB premix 500 mg  500 mg IntraVENous Q8H    mesalamine (LIALDA) delayed release tablet 2,400 mg- PATIENT SUPPLIED  2.4 g Oral BID WITH MEALS    folic acid (FOLVITE) tablet 1 mg  1 mg Oral DAILY    promethazine (PHENERGAN) tablet 12.5 mg  12.5 mg Oral Q6H PRN    sodium chloride (NS) flush 5-10 mL  5-10 mL IntraVENous Q8H    sodium chloride (NS) flush 5-10 mL  5-10 mL IntraVENous PRN    ondansetron (ZOFRAN) injection 4 mg  4 mg IntraVENous Q4H PRN    zolpidem (AMBIEN) tablet 5 mg  5 mg Oral QHS PRN    0.9% sodium chloride with KCl 20 mEq/L infusion   IntraVENous CONTINUOUS    HYDROmorphone (PF) (DILAUDID) injection 1 mg  1 mg IntraVENous Q6H PRN    acetaminophen (TYLENOL) tablet 650 mg  650 mg Oral Q4H PRN    methylPREDNISolone (PF) (SOLU-MEDROL) injection 40 mg  40 mg IntraVENous Q12H       Review of Systems:  ROS was obtained, with pertinent positives as listed above. No chest pain or SOB. Diet:  GI soft    Objective:   Vitals:  Visit Vitals    /77    Pulse 68    Temp 97.8 °F (36.6 °C)    Resp 18    Ht 5' 9\" (1.753 m)    Wt 94 kg (207 lb 3.2 oz)    SpO2 93%    BMI 30.6 kg/m2     Intake/Output:     01/21 1901 - 01/23 0700  In: 2817 [P.O.:480; I.V.:2241]  Out: -   Exam:  General appearance: alert, cooperative, no distress SPOUSE AT BEDSIDE  Lungs: clear to auscultation bilaterally anteriorly  Heart: regular rate and rhythm  Abdomen: soft, trace epigastric ttp.   Bowel sounds normal. No masses, no organomegaly  Extremities: extremities normal, atraumatic, no cyanosis or edema  Neuro:  alert and oriented X4    Data Review (Labs):    Recent Labs      01/23/17   0543  01/22/17   1853  01/22/17   0806  01/21/17   1947  01/21/17   0740  01/20/17   2133  01/20/17   1316   WBC  9.3   --    --    --   15.9*   --    --    HGB  7.2*  8.1*  7.7*  9.0*  8.4*  8.6*  8.5*   HCT  23.4*  26.8*  24.8*  28.8*  26.8*  26.9*  26.7*   PLT  583*   --    --    --   609*   --    --    MCV  85.4   --    --    --   83.8   --    --    NA   --    --    --    --   145   --    --    K   --    --    --    --   3.6   --    --    CL   --    --    --    --   110*   --    --    CO2   --    --    --    --   26   --    --    BUN   --    --    --    --   7   --    --    CREA   --    --    --    --   0.60*   --    --    CA   --    --    --    --   7.9*   --    --    GLU   --    --    --    --   111*   --    --    AP   --    --    --    --   55   --    --    SGOT   --    --    --    --   7*   --    --    ALT   --    --    --    --   9*   --    --    TBILI   --    --    --    --   0.2   --    --    ALB   --    --    --    --   1.9*   --    --    TP   --    --    --    --   5.4*   --    -- Assessment:     Active Problems:    Rectal bleed (12/1/2016)      Abdominal pain (12/1/2016)      Fever (1/12/2017)      Acute blood loss anemia (1/18/2017)      Ulcerative colitis ([facility] [address] (1/18/2017)      Clostridium difficile diarrhea (1/18/2017)       39 yo male who we admitted due to hypotension, increased diarrhea with C.diff and UC, ABLA with drop in HGB from 8.4 to 7.0 on admission1/18/17. He was started on Remicade therapy during a previous hospitalization on 12/9/16, and received the second dose on 12/22/16. He is due for 3rd dose on 1/19/17, which we are holding due to [address] [place] 1/12/17. CRP is elevated at 12 on 1/18/17. Our office is aware. He is currently on Lialda and Solu Medrol. He dropped his HGB 6.5 and required transfusion on 1/18/17. His most recent Hgb 1/23/17 is 7.2 which is down (8.1). KUB w nonspecific bowel gas pattern. WBC 15.9 on 1/21/17, improved 1/23 to 9.3. Leukocytosis felt likely secondary to steroid therapy. Cultures for blood and urine negative on 1/18/17. He underwent Flexible sigmoidoscopy on 1/20/17 by Dr. Bridger Cohn which revealed mucosa with white exudate and few areas with possible pseudomembrane appearance. However, likely background of active UC as well.        Plan: 1. Continue GI soft low residue diet  2. Pepcid 20mg one po bid  3. Bentyl 10mg one po QID PRN abdominal cramping   4. Continue IV solu Medrol. Hopefully, can change to po steroids in near future  5. Continue Vancomycin and Flagyl  (day 10 for C. Difficile)  6. Continue Mesalamine  7. Await results of pathology  8. Continue to follow trend of H/H. May benefit from transfusion pRBC. Will review home iron. May need to resume dose here in hospital setting.     Zane Niño PA-C

## 2017-01-24 LAB
BASOPHILS # BLD AUTO: 0 K/UL (ref 0–0.2)
BASOPHILS # BLD: 0 % (ref 0–2)
CRP SERPL HS-MCNC: 39.1 MG/L
DIFFERENTIAL METHOD BLD: ABNORMAL
EOSINOPHIL # BLD: 0 K/UL (ref 0–0.8)
EOSINOPHIL NFR BLD: 0 % (ref 0.5–7.8)
ERYTHROCYTE [DISTWIDTH] IN BLOOD BY AUTOMATED COUNT: 20.6 % (ref 11.9–14.6)
HCT VFR BLD AUTO: 24.7 % (ref 41.1–50.3)
HCT VFR BLD AUTO: 27.4 % (ref 41.1–50.3)
HGB BLD-MCNC: 7.5 G/DL (ref 13.6–17.2)
HGB BLD-MCNC: 8.2 G/DL (ref 13.6–17.2)
IMM GRANULOCYTES # BLD: 0.1 K/UL (ref 0–0.5)
IMM GRANULOCYTES NFR BLD AUTO: 0.4 % (ref 0–5)
LYMPHOCYTES # BLD AUTO: 13 % (ref 13–44)
LYMPHOCYTES # BLD: 1.7 K/UL (ref 0.5–4.6)
MCH RBC QN AUTO: 26 PG (ref 26.1–32.9)
MCHC RBC AUTO-ENTMCNC: 30.4 G/DL (ref 31.4–35)
MCV RBC AUTO: 85.5 FL (ref 79.6–97.8)
MONOCYTES # BLD: 1.8 K/UL (ref 0.1–1.3)
MONOCYTES NFR BLD AUTO: 14 % (ref 4–12)
NEUTS SEG # BLD: 9.6 K/UL (ref 1.7–8.2)
NEUTS SEG NFR BLD AUTO: 73 % (ref 43–78)
PLATELET # BLD AUTO: 553 K/UL (ref 150–450)
PMV BLD AUTO: 8.8 FL (ref 10.8–14.1)
RBC # BLD AUTO: 2.89 M/UL (ref 4.23–5.67)
WBC # BLD AUTO: 13.1 K/UL (ref 4.3–11.1)

## 2017-01-24 PROCEDURE — 74011250637 HC RX REV CODE- 250/637: Performed by: NURSE PRACTITIONER

## 2017-01-24 PROCEDURE — 74011250637 HC RX REV CODE- 250/637: Performed by: INTERNAL MEDICINE

## 2017-01-24 PROCEDURE — 74011250636 HC RX REV CODE- 250/636: Performed by: INTERNAL MEDICINE

## 2017-01-24 PROCEDURE — 36415 COLL VENOUS BLD VENIPUNCTURE: CPT | Performed by: INTERNAL MEDICINE

## 2017-01-24 PROCEDURE — 85018 HEMOGLOBIN: CPT | Performed by: NURSE PRACTITIONER

## 2017-01-24 PROCEDURE — 74011636637 HC RX REV CODE- 636/637: Performed by: INTERNAL MEDICINE

## 2017-01-24 PROCEDURE — 86141 C-REACTIVE PROTEIN HS: CPT | Performed by: INTERNAL MEDICINE

## 2017-01-24 PROCEDURE — 74011250637 HC RX REV CODE- 250/637: Performed by: PHYSICIAN ASSISTANT

## 2017-01-24 PROCEDURE — 85025 COMPLETE CBC W/AUTO DIFF WBC: CPT | Performed by: INTERNAL MEDICINE

## 2017-01-24 PROCEDURE — 74011250636 HC RX REV CODE- 250/636: Performed by: NURSE PRACTITIONER

## 2017-01-24 PROCEDURE — 65270000029 HC RM PRIVATE

## 2017-01-24 RX ORDER — PANTOPRAZOLE SODIUM 40 MG/1
40 TABLET, DELAYED RELEASE ORAL
Status: DISCONTINUED | OUTPATIENT
Start: 2017-01-25 | End: 2017-02-07 | Stop reason: HOSPADM

## 2017-01-24 RX ORDER — HYDROCODONE BITARTRATE AND ACETAMINOPHEN 7.5; 325 MG/1; MG/1
1 TABLET ORAL
Status: DISCONTINUED | OUTPATIENT
Start: 2017-01-24 | End: 2017-01-31

## 2017-01-24 RX ORDER — HYDROCORTISONE ACETATE 25 MG/1
25 SUPPOSITORY RECTAL
Status: DISCONTINUED | OUTPATIENT
Start: 2017-01-24 | End: 2017-02-07 | Stop reason: HOSPADM

## 2017-01-24 RX ADMIN — METRONIDAZOLE 500 MG: 500 TABLET ORAL at 11:19

## 2017-01-24 RX ADMIN — SODIUM CHLORIDE AND POTASSIUM CHLORIDE: 9; 1.49 INJECTION, SOLUTION INTRAVENOUS at 06:18

## 2017-01-24 RX ADMIN — ZOLPIDEM TARTRATE 5 MG: 5 TABLET, FILM COATED ORAL at 21:50

## 2017-01-24 RX ADMIN — VANCOMYCIN HYDROCHLORIDE 125 MG: 1 INJECTION, POWDER, LYOPHILIZED, FOR SOLUTION INTRAVENOUS at 11:21

## 2017-01-24 RX ADMIN — Medication 10 ML: at 21:50

## 2017-01-24 RX ADMIN — HYDROCODONE BITARTRATE AND ACETAMINOPHEN 1 TABLET: 7.5; 325 TABLET ORAL at 16:12

## 2017-01-24 RX ADMIN — MESALAMINE 2400 MG: 1.2 TABLET, DELAYED RELEASE ORAL at 16:13

## 2017-01-24 RX ADMIN — SODIUM CHLORIDE AND POTASSIUM CHLORIDE: 9; 1.49 INJECTION, SOLUTION INTRAVENOUS at 17:03

## 2017-01-24 RX ADMIN — Medication 10 ML: at 14:00

## 2017-01-24 RX ADMIN — ACETAMINOPHEN 650 MG: 325 TABLET, FILM COATED ORAL at 06:17

## 2017-01-24 RX ADMIN — Medication 10 ML: at 00:00

## 2017-01-24 RX ADMIN — Medication 10 ML: at 06:15

## 2017-01-24 RX ADMIN — HYDROCODONE BITARTRATE AND ACETAMINOPHEN 1 TABLET: 7.5; 325 TABLET ORAL at 21:50

## 2017-01-24 RX ADMIN — HYDROMORPHONE HYDROCHLORIDE 1 MG: 1 INJECTION, SOLUTION INTRAMUSCULAR; INTRAVENOUS; SUBCUTANEOUS at 09:44

## 2017-01-24 RX ADMIN — ACETAMINOPHEN 650 MG: 325 TABLET, FILM COATED ORAL at 21:50

## 2017-01-24 RX ADMIN — HYDROMORPHONE HYDROCHLORIDE 1 MG: 1 INJECTION, SOLUTION INTRAMUSCULAR; INTRAVENOUS; SUBCUTANEOUS at 02:38

## 2017-01-24 RX ADMIN — PREDNISONE 40 MG: 20 TABLET ORAL at 09:46

## 2017-01-24 RX ADMIN — METRONIDAZOLE 500 MG: 500 TABLET ORAL at 09:46

## 2017-01-24 RX ADMIN — MESALAMINE 2400 MG: 1.2 TABLET, DELAYED RELEASE ORAL at 09:47

## 2017-01-24 RX ADMIN — VANCOMYCIN HYDROCHLORIDE 125 MG: 1 INJECTION, POWDER, LYOPHILIZED, FOR SOLUTION INTRAVENOUS at 17:03

## 2017-01-24 RX ADMIN — FAMOTIDINE 20 MG: 20 TABLET ORAL at 09:46

## 2017-01-24 RX ADMIN — VANCOMYCIN HYDROCHLORIDE 125 MG: 1 INJECTION, POWDER, LYOPHILIZED, FOR SOLUTION INTRAVENOUS at 06:14

## 2017-01-24 RX ADMIN — FOLIC ACID 1 MG: 1 TABLET ORAL at 09:46

## 2017-01-24 RX ADMIN — ZOLPIDEM TARTRATE 5 MG: 5 TABLET, FILM COATED ORAL at 00:05

## 2017-01-24 NOTE — PROGRESS NOTES
Appeared to be improving since last week where writer was assigned to pt. Less c/o pain. Moving around room.

## 2017-01-24 NOTE — PROGRESS NOTES
Pt advised of prn PO meds ordered and available frequency. Encouraged pt to use when pain begins. Reports understanding.

## 2017-01-24 NOTE — PROGRESS NOTES
Problem: Nutrition Deficit  Goal: *Optimize nutritional status  Nutrition F/U: Day 6  Assessment:   Diet order(s): GI soft  Food/Nutrition Patient History: Pt with improved appetite. Pt reports some confusion on his diet order. Reports that he has been told various conflicting things. Pt states that he thought high fiber was \"healthy\" RD explained that right now due to his gi issues that low fiber would help give his bowel rest. Pt also reports that he is a vegetarian and they have been sending him meat. RD to change diet order. Meds: Deltasone     Anthropometrics:Height: 5' 9\" (175.3 cm), Weight: 93.9 kg (207 lb), Weight Source: Standing Scale, Body mass index is 30.42 kg/(m^2). BMI class of obesity class I. Macronutrient needs:  EER: 2610-8695 kcal /day (20-25 kcal/kg actual BW)  EPR: 73-87 grams protein/day (1.0-1.2 grams/kg IBW)  Intake/Comparative Standards: Current po intakes based on past 4 recorded meals is: 100%. This potentially meats 100% of estimated kcal needs and 100% of estimated protein needs. Intervention:  Meals and snacks: Change pt to a vegetarian GI soft diet with no seasoning. Nutrition Supplement Therapy: Ensure clear, bid (breakfast and dinner)      Olivia G. Margaretta Spatz, [address], [address] [place] [address],  [phone]

## 2017-01-24 NOTE — PROGRESS NOTES
GI DAILY PROGRESS NOTE    Admit Date:  1/18/2017    Today's Date:  1/24/2017    CC:  Diarrhea and C. Difficile; UC    Subjective:     Patient : Still about 5-6 BMs daily. Since advancement to GI soft diet slight improvement in consistency though still fairly loose. No further bleeding, except for a few drops of blood in the commode after passing stool. Increased lower abdominal discomfort last night requiring IV Dilaudid. He questions if broccoli soup aggravated symptoms. Having some rectal discomfort which he relates to hemorrhoids. Hydrocortisone creams helps some. Bentyl helps some and he questions whether he is receiving this around the clock. Hgb 7.5 this (improved from 7.4 & 7.2 on 1/23/17).       Medications:   Current Facility-Administered Medications   Medication Dose Route Frequency    metroNIDAZOLE (FLAGYL) tablet 500 mg  500 mg Oral TID WITH MEALS    predniSONE (DELTASONE) tablet 40 mg  40 mg Oral DAILY WITH BREAKFAST    famotidine (PEPCID) tablet 20 mg  20 mg Oral BID    dicyclomine (BENTYL) capsule 10 mg  10 mg Oral QID PRN    calcium carbonate (TUMS) chewable tablet 200 mg [elemental]  200 mg Oral TID PRN    alum-mag hydroxide-simeth (MYLANTA) oral suspension 30 mL  30 mL Oral Q4H PRN    lactated ringers infusion  100 mL/hr IntraVENous CONTINUOUS    vancomycin 50 mg/mL oral solution (compounded) 125 mg  125 mg Oral Q6H    0.9% sodium chloride infusion 250 mL  250 mL IntraVENous PRN    mesalamine (LIALDA) delayed release tablet 2,400 mg- PATIENT SUPPLIED  2.4 g Oral BID WITH MEALS    folic acid (FOLVITE) tablet 1 mg  1 mg Oral DAILY    promethazine (PHENERGAN) tablet 12.5 mg  12.5 mg Oral Q6H PRN    sodium chloride (NS) flush 5-10 mL  5-10 mL IntraVENous Q8H    sodium chloride (NS) flush 5-10 mL  5-10 mL IntraVENous PRN    ondansetron (ZOFRAN) injection 4 mg  4 mg IntraVENous Q4H PRN    zolpidem (AMBIEN) tablet 5 mg  5 mg Oral QHS PRN    0.9% sodium chloride with KCl 20 mEq/L infusion   IntraVENous CONTINUOUS    HYDROmorphone (PF) (DILAUDID) injection 1 mg  1 mg IntraVENous Q6H PRN    acetaminophen (TYLENOL) tablet 650 mg  650 mg Oral Q4H PRN       Review of Systems:  ROS was obtained, with pertinent positives as listed above. No chest pain or SOB. Diet:  GI soft    Objective:   Vitals:  Visit Vitals    /68    Pulse 80    Temp 98.1 °F (36.7 °C)    Resp 17    Ht 5' 9\" (1.753 m)    Wt 94 kg (207 lb 3.2 oz)    SpO2 94%    BMI 30.6 kg/m2     Intake/Output:     01/22 1901 - 01/24 0700  In: 8681 [P.O.:480; I.V.:2242]  Out: -   Exam:  General appearance: alert, cooperative, no distress SPOUSE AT BEDSIDE  Lungs: clear to auscultation bilaterally anteriorly  Heart: regular rate and rhythm  Abdomen: soft, +bilateral lower abdominal ttp, no rebound. Bowel sounds normal. No masses, no organomegaly  Extremities: extremities normal, atraumatic, no cyanosis or edema  Neuro:  alert and oriented X4    Data Review (Labs):    Recent Labs      01/24/17   0653  01/23/17   2040  01/23/17   0543  01/22/17   1853  01/22/17   0806  01/21/17   1947   WBC  13.1*   --   9.3   --    --    --    HGB  7.5*  7.4*  7.2*  8.1*  7.7*  9.0*   HCT  24.7*  24.5*  23.4*  26.8*  24.8*  28.8*   PLT  553*   --   583*   --    --    --    MCV  85.5   --   85.4   --    --    --        Assessment:     Principal Problem:    Ulcerative colitis ([address] (1/18/2017)    Active Problems:    Rectal bleed (12/1/2016)      Abdominal pain (12/1/2016)      Fever (1/12/2017)      Acute blood loss anemia (1/18/2017)      Clostridium difficile diarrhea (1/18/2017)       41 yo male who we admitted due to hypotension, increased diarrhea with C.diff and UC, ABLA with drop in HGB from 8.4 to 7.0 on admission1/18/17. He was started on Remicade therapy during a previous hospitalization on 12/9/16, and received the second dose on 12/22/16. He is due for 3rd dose on 1/19/17, which we are holding due to [facility] [place] 1/12/17.  CRP elevated at 12 on 1/18/17. Our office is aware. He was currently on Lialda and Solu Medrol. Transitioned oral steroid with Prednisone 40mg daily 1/23/17 and oral antibiotics. He dropped his HGB 6.5 and required transfusion on 1/18/17. His most recent Hgb 1/24/17 is 7.5 which is improved from 7.4 & 7.2 on 1/23/17. KUB w nonspecific bowel gas pattern. WBC 15.9 on 1/21/17, improved 1/23 to 9.3, increased to around 13 on 1/24/17. Leukocytosis felt likely secondary to steroid therapy. Cultures for blood and urine negative on 1/18/17. He underwent Flexible sigmoidoscopy on 1/20/17 by Dr. Anitha Pierre which revealed mucosa with white exudate and few areas with possible pseudomembrane appearance. However, likely background of active UC as well.        Plan: 1. Continue GI soft diet. Will request low residue diet as well with avoidance of pepper per pt request.  2.Pepcid 20mg one po bid  3. Will schedule Bentyl 10mg one po TID for abdominal cramping   4. Continue Prednisone 40mg one tab po daily. 5.Continue oral Vancomycin and Flagyl  (day 10 for C. Difficile)  6. Continue Mesalamine  7. He is currently receiving IV Dilaudid for pain management. May try to add oral pain medication to see how he does. 8.Await results of pathology  9. Continue to follow trend of H/H. Hgb remains low though stable at 7.5. He feels ok. No plan for transfusion pRBC at this time. Monitor with transfusions as needed.     Kita Braun PA-C

## 2017-01-25 ENCOUNTER — ANESTHESIA EVENT (OUTPATIENT)
Dept: ENDOSCOPY | Age: 47
DRG: 372 | End: 2017-01-25
Payer: COMMERCIAL

## 2017-01-25 LAB
BASOPHILS # BLD AUTO: 0 K/UL (ref 0–0.2)
BASOPHILS # BLD: 0 % (ref 0–2)
DIFFERENTIAL METHOD BLD: ABNORMAL
EOSINOPHIL # BLD: 0.1 K/UL (ref 0–0.8)
EOSINOPHIL NFR BLD: 1 % (ref 0.5–7.8)
ERYTHROCYTE [DISTWIDTH] IN BLOOD BY AUTOMATED COUNT: 20.6 % (ref 11.9–14.6)
HCT VFR BLD AUTO: 25.4 % (ref 41.1–50.3)
HCT VFR BLD AUTO: 28.7 % (ref 41.1–50.3)
HGB BLD-MCNC: 7.7 G/DL (ref 13.6–17.2)
HGB BLD-MCNC: 8.7 G/DL (ref 13.6–17.2)
IMM GRANULOCYTES # BLD: 0.1 K/UL (ref 0–0.5)
IMM GRANULOCYTES NFR BLD AUTO: 0.5 % (ref 0–5)
LYMPHOCYTES # BLD AUTO: 11 % (ref 13–44)
LYMPHOCYTES # BLD: 1.7 K/UL (ref 0.5–4.6)
MCH RBC QN AUTO: 26 PG (ref 26.1–32.9)
MCHC RBC AUTO-ENTMCNC: 30.3 G/DL (ref 31.4–35)
MCV RBC AUTO: 85.9 FL (ref 79.6–97.8)
MONOCYTES # BLD: 1.1 K/UL (ref 0.1–1.3)
MONOCYTES NFR BLD AUTO: 7 % (ref 4–12)
NEUTS SEG # BLD: 12.2 K/UL (ref 1.7–8.2)
NEUTS SEG NFR BLD AUTO: 81 % (ref 43–78)
PLATELET # BLD AUTO: 644 K/UL (ref 150–450)
PMV BLD AUTO: 8.9 FL (ref 10.8–14.1)
RBC # BLD AUTO: 3.34 M/UL (ref 4.23–5.67)
WBC # BLD AUTO: 15.2 K/UL (ref 4.3–11.1)

## 2017-01-25 PROCEDURE — 74011250636 HC RX REV CODE- 250/636: Performed by: INTERNAL MEDICINE

## 2017-01-25 PROCEDURE — 74011250637 HC RX REV CODE- 250/637: Performed by: NURSE PRACTITIONER

## 2017-01-25 PROCEDURE — 85018 HEMOGLOBIN: CPT | Performed by: NURSE PRACTITIONER

## 2017-01-25 PROCEDURE — 74011250637 HC RX REV CODE- 250/637: Performed by: PHYSICIAN ASSISTANT

## 2017-01-25 PROCEDURE — 65270000029 HC RM PRIVATE

## 2017-01-25 PROCEDURE — 74011636637 HC RX REV CODE- 636/637: Performed by: INTERNAL MEDICINE

## 2017-01-25 PROCEDURE — 85025 COMPLETE CBC W/AUTO DIFF WBC: CPT | Performed by: PHYSICIAN ASSISTANT

## 2017-01-25 PROCEDURE — 74011250637 HC RX REV CODE- 250/637: Performed by: INTERNAL MEDICINE

## 2017-01-25 PROCEDURE — 36415 COLL VENOUS BLD VENIPUNCTURE: CPT | Performed by: PHYSICIAN ASSISTANT

## 2017-01-25 RX ADMIN — VANCOMYCIN HYDROCHLORIDE 125 MG: 1 INJECTION, POWDER, LYOPHILIZED, FOR SOLUTION INTRAVENOUS at 18:12

## 2017-01-25 RX ADMIN — SODIUM CHLORIDE AND POTASSIUM CHLORIDE: 9; 1.49 INJECTION, SOLUTION INTRAVENOUS at 13:45

## 2017-01-25 RX ADMIN — ALUMINUM HYDROXIDE, MAGNESIUM HYDROXIDE, AND SIMETHICONE 30 ML: 200; 200; 20 SUSPENSION ORAL at 16:13

## 2017-01-25 RX ADMIN — PREDNISONE 40 MG: 20 TABLET ORAL at 08:13

## 2017-01-25 RX ADMIN — MESALAMINE 2400 MG: 1.2 TABLET, DELAYED RELEASE ORAL at 16:11

## 2017-01-25 RX ADMIN — Medication 10 ML: at 14:00

## 2017-01-25 RX ADMIN — DICYCLOMINE HYDROCHLORIDE 10 MG: 10 CAPSULE ORAL at 10:22

## 2017-01-25 RX ADMIN — HYDROCODONE BITARTRATE AND ACETAMINOPHEN 1 TABLET: 7.5; 325 TABLET ORAL at 17:33

## 2017-01-25 RX ADMIN — HYDROCODONE BITARTRATE AND ACETAMINOPHEN 1 TABLET: 7.5; 325 TABLET ORAL at 22:21

## 2017-01-25 RX ADMIN — MESALAMINE 2400 MG: 1.2 TABLET, DELAYED RELEASE ORAL at 08:16

## 2017-01-25 RX ADMIN — FOLIC ACID 1 MG: 1 TABLET ORAL at 08:13

## 2017-01-25 RX ADMIN — VANCOMYCIN HYDROCHLORIDE 125 MG: 1 INJECTION, POWDER, LYOPHILIZED, FOR SOLUTION INTRAVENOUS at 00:45

## 2017-01-25 RX ADMIN — Medication 10 ML: at 05:48

## 2017-01-25 RX ADMIN — VANCOMYCIN HYDROCHLORIDE 125 MG: 1 INJECTION, POWDER, LYOPHILIZED, FOR SOLUTION INTRAVENOUS at 05:47

## 2017-01-25 RX ADMIN — ACETAMINOPHEN 650 MG: 325 TABLET, FILM COATED ORAL at 08:13

## 2017-01-25 RX ADMIN — PANTOPRAZOLE SODIUM 40 MG: 40 TABLET, DELAYED RELEASE ORAL at 05:47

## 2017-01-25 RX ADMIN — VANCOMYCIN HYDROCHLORIDE 125 MG: 1 INJECTION, POWDER, LYOPHILIZED, FOR SOLUTION INTRAVENOUS at 13:46

## 2017-01-25 RX ADMIN — SODIUM CHLORIDE AND POTASSIUM CHLORIDE: 9; 1.49 INJECTION, SOLUTION INTRAVENOUS at 03:42

## 2017-01-25 RX ADMIN — HYDROCODONE BITARTRATE AND ACETAMINOPHEN 1 TABLET: 7.5; 325 TABLET ORAL at 03:42

## 2017-01-25 RX ADMIN — HYDROCODONE BITARTRATE AND ACETAMINOPHEN 1 TABLET: 7.5; 325 TABLET ORAL at 07:12

## 2017-01-25 NOTE — PROGRESS NOTES
GI DAILY PROGRESS NOTE    Admit Date:  1/18/2017    Today's Date:  1/25/2017    CC:  Diarrhea and C. Difficile; UC    Subjective:     Patient : Still about 5-6 BMs daily. No further bleeding, except for a few drops of blood in the commode after passing stool. Still increased lower abdominal discomfort with bowel movement. This seems to be responding to po Norco. Having some rectal discomfort which he relates to hemorrhoids. Hydrocortisone creams helps some. Tried Sitz bath overnight. Last Hgb 8.7 this AM (improved from 8.2, 7.5, 7.4 & 7.2 on 1/23/17). Per RN temp around 100 degrees F this AM.  No c/o chest pain or shortness of breath. No c/o dysuria.   Medications:   Current Facility-Administered Medications   Medication Dose Route Frequency    hydrocortisone (ANUSOL-HC) suppository 25 mg  25 mg Rectal Q12H PRN    HYDROcodone-acetaminophen (NORCO) 7.5-325 mg per tablet 1 Tab  1 Tab Oral Q4H PRN    pantoprazole (PROTONIX) tablet 40 mg  40 mg Oral ACB    predniSONE (DELTASONE) tablet 40 mg  40 mg Oral DAILY WITH BREAKFAST    dicyclomine (BENTYL) capsule 10 mg  10 mg Oral QID PRN    calcium carbonate (TUMS) chewable tablet 200 mg [elemental]  200 mg Oral TID PRN    alum-mag hydroxide-simeth (MYLANTA) oral suspension 30 mL  30 mL Oral Q4H PRN    lactated ringers infusion  100 mL/hr IntraVENous CONTINUOUS    vancomycin 50 mg/mL oral solution (compounded) 125 mg  125 mg Oral Q6H    0.9% sodium chloride infusion 250 mL  250 mL IntraVENous PRN    mesalamine (LIALDA) delayed release tablet 2,400 mg- PATIENT SUPPLIED  2.4 g Oral BID WITH MEALS    folic acid (FOLVITE) tablet 1 mg  1 mg Oral DAILY    promethazine (PHENERGAN) tablet 12.5 mg  12.5 mg Oral Q6H PRN    sodium chloride (NS) flush 5-10 mL  5-10 mL IntraVENous Q8H    sodium chloride (NS) flush 5-10 mL  5-10 mL IntraVENous PRN    ondansetron (ZOFRAN) injection 4 mg  4 mg IntraVENous Q4H PRN    zolpidem (AMBIEN) tablet 5 mg  5 mg Oral QHS PRN    0.9% sodium chloride with KCl 20 mEq/L infusion   IntraVENous CONTINUOUS    HYDROmorphone (PF) (DILAUDID) injection 1 mg  1 mg IntraVENous Q6H PRN    acetaminophen (TYLENOL) tablet 650 mg  650 mg Oral Q4H PRN       Review of Systems:  ROS was obtained, with pertinent positives as listed above. No chest pain or SOB. Diet:  GI soft/Low residue    Objective:   Vitals:  Visit Vitals    /75    Pulse 99    Temp 98.9 °F (37.2 °C)    Resp 18    Ht 5' 9\" (1.753 m)    Wt 94 kg (207 lb 3.2 oz)    SpO2 100%    BMI 30.6 kg/m2     Intake/Output:     01/23 1901 - 01/25 0700  In: 2503 [P.O.:480; I.V.:2023]  Out: 2 [Urine:2]  Exam:  General appearance: alert, cooperative, no distress SPOUSE AT BEDSIDE  Lungs: clear to auscultation bilaterally anteriorly  Heart: regular rate and rhythm  Abdomen: soft, Non-tender. Bowel sounds normal. No masses, no organomegaly  Neuro:  alert and oriented X4    Data Review (Labs):    Recent Labs      01/25/17   0732  01/24/17   1910  01/24/17   0653  01/23/17   2040  01/23/17   0543  01/22/17   1853   WBC  15.2*   --   13.1*   --   9.3   --    HGB  8.7*  8.2*  7.5*  7.4*  7.2*  8.1*   HCT  28.7*  27.4*  24.7*  24.5*  23.4*  26.8*   PLT  644*   --   553*   --   583*   --    MCV  85.9   --   85.5   --   85.4   --        Assessment:     Principal Problem:    Ulcerative colitis ([facility] [address] (1/18/2017)    Active Problems:    Rectal bleed (12/1/2016)      Abdominal pain (12/1/2016)      Fever (1/12/2017)      Acute blood loss anemia (1/18/2017)      Clostridium difficile diarrhea (1/18/2017)       41 yo male who we admitted due to hypotension, increased diarrhea with C.diff and UC, ABLA with drop in HGB from 8.4 to 7.0 on admission1/18/17. He was started on Remicade therapy during a previous hospitalization on 12/9/16, and received the second dose on 12/22/16. He is due for 3rd dose on 1/19/17, which we are holding due [place] [facility] [place]. CRP elevated at 12 on 1/18/17. Our office is aware.  He was currently on Lialda and Solu Medrol. Transitioned oral steroid with Prednisone 40mg daily 1/23/17 and oral antibiotics (was on po Flagyl & Vanc- De-escalated ABX to vancomycin only on 1/24/17) (Had WBCs 26.8 on admit), treat 14 days. He dropped his HGB 6.5 and required transfusion on 1/18/17. His most recent Hgb 1/24/17 is 7.5 which is improved from 7.4 & 7.2 on 1/23/17. KUB w nonspecific bowel gas pattern. WBC 15.9 on 1/21/17, improved 1/23 to 9.3, increased to around 13 on 1/24/17. Leukocytosis felt likely secondary to steroid therapy. Cultures for blood and urine negative on 1/18/17. He underwent Flexible sigmoidoscopy on 1/20/17 by Dr. Olga Tang which revealed mucosa with white exudate and few areas with possible pseudomembrane appearance. However, likely background of active UC as well.        Plan: 1. Continue GI soft/Low residue diet  2. Bentyl 10mg one po TID for abdominal cramping   3. Continue Prednisone 40mg one tab po daily. 4. Continue Mesalamine. Overdue for Remicade, plan to restart after C diff treatment  5. Continue oral Vancomycin  6. p.o Norco as needed for abdominal pain. 7. Await results of pathology  8. Continue to follow trend of H/H. Hgb remains low- appears stable and improving. He feels ok. No plan for transfusion pRBC at this time. Monitor with transfusions as needed.     Yaima Farah PA-C

## 2017-01-26 ENCOUNTER — ANESTHESIA (OUTPATIENT)
Dept: ENDOSCOPY | Age: 47
DRG: 372 | End: 2017-01-26
Payer: COMMERCIAL

## 2017-01-26 ENCOUNTER — SURGERY (OUTPATIENT)
Age: 47
End: 2017-01-26

## 2017-01-26 ENCOUNTER — APPOINTMENT (OUTPATIENT)
Dept: GENERAL RADIOLOGY | Age: 47
DRG: 372 | End: 2017-01-26
Attending: INTERNAL MEDICINE
Payer: COMMERCIAL

## 2017-01-26 LAB
APPEARANCE UR: CLEAR
BILIRUB UR QL: NEGATIVE
COLOR UR: YELLOW
CRP SERPL-MCNC: 12.1 MG/DL (ref 0–0.9)
GLUCOSE UR STRIP.AUTO-MCNC: NEGATIVE MG/DL
HCT VFR BLD AUTO: 25.1 % (ref 41.1–50.3)
HCT VFR BLD AUTO: 27.1 % (ref 41.1–50.3)
HGB BLD-MCNC: 7.5 G/DL (ref 13.6–17.2)
HGB BLD-MCNC: 8.1 G/DL (ref 13.6–17.2)
HGB UR QL STRIP: NEGATIVE
KETONES UR QL STRIP.AUTO: 15 MG/DL
LEUKOCYTE ESTERASE UR QL STRIP.AUTO: NEGATIVE
NITRITE UR QL STRIP.AUTO: NEGATIVE
PH UR STRIP: 7 [PH] (ref 5–9)
PROT UR STRIP-MCNC: NEGATIVE MG/DL
SP GR UR REFRACTOMETRY: 1.01 (ref 1–1.02)
UROBILINOGEN UR QL STRIP.AUTO: 0.2 EU/DL (ref 0.2–1)

## 2017-01-26 PROCEDURE — 74011250636 HC RX REV CODE- 250/636: Performed by: INTERNAL MEDICINE

## 2017-01-26 PROCEDURE — 76040000025: Performed by: INTERNAL MEDICINE

## 2017-01-26 PROCEDURE — 74011250636 HC RX REV CODE- 250/636: Performed by: ANESTHESIOLOGY

## 2017-01-26 PROCEDURE — 74011250637 HC RX REV CODE- 250/637: Performed by: NURSE PRACTITIONER

## 2017-01-26 PROCEDURE — 74011000250 HC RX REV CODE- 250

## 2017-01-26 PROCEDURE — 0DBP8ZX EXCISION OF RECTUM, VIA NATURAL OR ARTIFICIAL OPENING ENDOSCOPIC, DIAGNOSTIC: ICD-10-PCS | Performed by: INTERNAL MEDICINE

## 2017-01-26 PROCEDURE — 88305 TISSUE EXAM BY PATHOLOGIST: CPT | Performed by: INTERNAL MEDICINE

## 2017-01-26 PROCEDURE — 86140 C-REACTIVE PROTEIN: CPT | Performed by: INTERNAL MEDICINE

## 2017-01-26 PROCEDURE — 74011250637 HC RX REV CODE- 250/637: Performed by: PHYSICIAN ASSISTANT

## 2017-01-26 PROCEDURE — 85018 HEMOGLOBIN: CPT | Performed by: NURSE PRACTITIONER

## 2017-01-26 PROCEDURE — 74011250636 HC RX REV CODE- 250/636

## 2017-01-26 PROCEDURE — 87086 URINE CULTURE/COLONY COUNT: CPT | Performed by: INTERNAL MEDICINE

## 2017-01-26 PROCEDURE — 74011000250 HC RX REV CODE- 250: Performed by: PHYSICIAN ASSISTANT

## 2017-01-26 PROCEDURE — 77030009426 HC FCPS BIOP ENDOSC BSC -B: Performed by: INTERNAL MEDICINE

## 2017-01-26 PROCEDURE — 81003 URINALYSIS AUTO W/O SCOPE: CPT | Performed by: INTERNAL MEDICINE

## 2017-01-26 PROCEDURE — 76060000032 HC ANESTHESIA 0.5 TO 1 HR: Performed by: INTERNAL MEDICINE

## 2017-01-26 PROCEDURE — 74011250636 HC RX REV CODE- 250/636: Performed by: NURSE PRACTITIONER

## 2017-01-26 PROCEDURE — 65270000029 HC RM PRIVATE

## 2017-01-26 PROCEDURE — 36415 COLL VENOUS BLD VENIPUNCTURE: CPT | Performed by: NURSE PRACTITIONER

## 2017-01-26 PROCEDURE — 71020 XR CHEST PA LAT: CPT

## 2017-01-26 PROCEDURE — 87040 BLOOD CULTURE FOR BACTERIA: CPT | Performed by: INTERNAL MEDICINE

## 2017-01-26 PROCEDURE — 74011250637 HC RX REV CODE- 250/637: Performed by: INTERNAL MEDICINE

## 2017-01-26 PROCEDURE — 74011636637 HC RX REV CODE- 636/637: Performed by: INTERNAL MEDICINE

## 2017-01-26 RX ORDER — LIDOCAINE HYDROCHLORIDE 20 MG/ML
INJECTION, SOLUTION EPIDURAL; INFILTRATION; INTRACAUDAL; PERINEURAL AS NEEDED
Status: DISCONTINUED | OUTPATIENT
Start: 2017-01-26 | End: 2017-01-26 | Stop reason: HOSPADM

## 2017-01-26 RX ORDER — PROPOFOL 10 MG/ML
INJECTION, EMULSION INTRAVENOUS
Status: DISCONTINUED | OUTPATIENT
Start: 2017-01-26 | End: 2017-01-26 | Stop reason: HOSPADM

## 2017-01-26 RX ORDER — METRONIDAZOLE 500 MG/100ML
500 INJECTION, SOLUTION INTRAVENOUS EVERY 8 HOURS
Status: DISCONTINUED | OUTPATIENT
Start: 2017-01-26 | End: 2017-01-31

## 2017-01-26 RX ORDER — PROPOFOL 10 MG/ML
INJECTION, EMULSION INTRAVENOUS AS NEEDED
Status: DISCONTINUED | OUTPATIENT
Start: 2017-01-26 | End: 2017-01-26 | Stop reason: HOSPADM

## 2017-01-26 RX ORDER — SODIUM CHLORIDE, SODIUM LACTATE, POTASSIUM CHLORIDE, CALCIUM CHLORIDE 600; 310; 30; 20 MG/100ML; MG/100ML; MG/100ML; MG/100ML
1000 INJECTION, SOLUTION INTRAVENOUS CONTINUOUS
Status: DISCONTINUED | OUTPATIENT
Start: 2017-01-26 | End: 2017-01-26 | Stop reason: HOSPADM

## 2017-01-26 RX ADMIN — METRONIDAZOLE 500 MG: 500 INJECTION, SOLUTION INTRAVENOUS at 20:48

## 2017-01-26 RX ADMIN — SODIUM CHLORIDE AND POTASSIUM CHLORIDE: 9; 1.49 INJECTION, SOLUTION INTRAVENOUS at 01:26

## 2017-01-26 RX ADMIN — HYDROMORPHONE HYDROCHLORIDE 1 MG: 1 INJECTION, SOLUTION INTRAMUSCULAR; INTRAVENOUS; SUBCUTANEOUS at 13:58

## 2017-01-26 RX ADMIN — VANCOMYCIN HYDROCHLORIDE 125 MG: 1 INJECTION, POWDER, LYOPHILIZED, FOR SOLUTION INTRAVENOUS at 01:22

## 2017-01-26 RX ADMIN — PREDNISONE 40 MG: 20 TABLET ORAL at 08:34

## 2017-01-26 RX ADMIN — HYDROMORPHONE HYDROCHLORIDE 1 MG: 1 INJECTION, SOLUTION INTRAMUSCULAR; INTRAVENOUS; SUBCUTANEOUS at 07:54

## 2017-01-26 RX ADMIN — SODIUM CHLORIDE AND POTASSIUM CHLORIDE: 9; 1.49 INJECTION, SOLUTION INTRAVENOUS at 13:59

## 2017-01-26 RX ADMIN — LIDOCAINE HYDROCHLORIDE 60 MG: 20 INJECTION, SOLUTION EPIDURAL; INFILTRATION; INTRACAUDAL; PERINEURAL at 12:26

## 2017-01-26 RX ADMIN — PROPOFOL 50 MG: 10 INJECTION, EMULSION INTRAVENOUS at 12:27

## 2017-01-26 RX ADMIN — MESALAMINE 2400 MG: 1.2 TABLET, DELAYED RELEASE ORAL at 08:33

## 2017-01-26 RX ADMIN — Medication 10 ML: at 17:08

## 2017-01-26 RX ADMIN — ACETAMINOPHEN 650 MG: 325 TABLET, FILM COATED ORAL at 07:54

## 2017-01-26 RX ADMIN — MESALAMINE 2400 MG: 1.2 TABLET, DELAYED RELEASE ORAL at 17:08

## 2017-01-26 RX ADMIN — HYDROMORPHONE HYDROCHLORIDE 1 MG: 1 INJECTION, SOLUTION INTRAMUSCULAR; INTRAVENOUS; SUBCUTANEOUS at 14:00

## 2017-01-26 RX ADMIN — PROPOFOL 50 MG: 10 INJECTION, EMULSION INTRAVENOUS at 12:38

## 2017-01-26 RX ADMIN — HYDROCODONE BITARTRATE AND ACETAMINOPHEN 1 TABLET: 7.5; 325 TABLET ORAL at 03:56

## 2017-01-26 RX ADMIN — VANCOMYCIN HYDROCHLORIDE 250 MG: 1 INJECTION, POWDER, LYOPHILIZED, FOR SOLUTION INTRAVENOUS at 17:58

## 2017-01-26 RX ADMIN — METRONIDAZOLE 500 MG: 500 INJECTION, SOLUTION INTRAVENOUS at 17:08

## 2017-01-26 RX ADMIN — FOLIC ACID 1 MG: 1 TABLET ORAL at 08:34

## 2017-01-26 RX ADMIN — VANCOMYCIN HYDROCHLORIDE 125 MG: 1 INJECTION, POWDER, LYOPHILIZED, FOR SOLUTION INTRAVENOUS at 05:17

## 2017-01-26 RX ADMIN — PROPOFOL 200 MCG/KG/MIN: 10 INJECTION, EMULSION INTRAVENOUS at 12:27

## 2017-01-26 RX ADMIN — SODIUM CHLORIDE, SODIUM LACTATE, POTASSIUM CHLORIDE, AND CALCIUM CHLORIDE 100 ML/HR: 600; 310; 30; 20 INJECTION, SOLUTION INTRAVENOUS at 11:45

## 2017-01-26 RX ADMIN — HYDROCODONE BITARTRATE AND ACETAMINOPHEN 1 TABLET: 7.5; 325 TABLET ORAL at 19:32

## 2017-01-26 RX ADMIN — Medication 10 ML: at 05:13

## 2017-01-26 RX ADMIN — PANTOPRAZOLE SODIUM 40 MG: 40 TABLET, DELAYED RELEASE ORAL at 05:16

## 2017-01-26 RX ADMIN — ZOLPIDEM TARTRATE 5 MG: 5 TABLET, FILM COATED ORAL at 01:22

## 2017-01-26 RX ADMIN — HYDROMORPHONE HYDROCHLORIDE 1 MG: 1 INJECTION, SOLUTION INTRAMUSCULAR; INTRAVENOUS; SUBCUTANEOUS at 01:22

## 2017-01-26 RX ADMIN — SODIUM CHLORIDE, SODIUM LACTATE, POTASSIUM CHLORIDE, AND CALCIUM CHLORIDE: 600; 310; 30; 20 INJECTION, SOLUTION INTRAVENOUS at 11:58

## 2017-01-26 NOTE — PROGRESS NOTES
TRANSFER - OUT REPORT:    Verbal report given on Jazmín [place]  being transferred to [facility] of [facility] for routine post - op       Report consisted of patients Situation, Background, Assessment and   Recommendations(SBAR). Information from the following report(s) Procedure Summary was reviewed with the receiving nurse. Lines:   Peripheral IV 01/26/17 Left Hand (Active)        Opportunity for questions and clarification was provided.

## 2017-01-26 NOTE — PROGRESS NOTES
TRANSFER - OUT REPORT:    Verbal report given to GERRY Dahl(name) on Miladys Gift  being transferred to GI lab(unit) for ordered procedure       Report consisted of patients Situation, Background, Assessment and   Recommendations(SBAR). Information from the following report(s) SBAR was reviewed with the receiving nurse. Lines:   Peripheral IV 01/18/17 Left Forearm (Active)   Site Assessment Clean, dry, & intact 1/26/2017  7:10 AM   Phlebitis Assessment 0 1/26/2017  7:10 AM   Infiltration Assessment 0 1/26/2017  7:10 AM   Dressing Status Clean, dry, & intact 1/26/2017  7:10 AM   Dressing Type Tape;Transparent 1/26/2017  7:10 AM   Hub Color/Line Status Infusing 1/26/2017  7:10 AM   Alcohol Cap Used No 1/26/2017  7:10 AM        Opportunity for questions and clarification was provided.       Patient transported with:

## 2017-01-26 NOTE — PROGRESS NOTES
Patient with intermittent low grade fevers. Repeat cultures. CXR, UA, C&S. Repeat CRP to compare to prior to see if suggestion improvement.     Flex sig today    Lynda Galaviz MD  [facility], [address]

## 2017-01-26 NOTE — PROGRESS NOTES
Patient Given Dilaudid 1mg IV for pain rated 10/10. Patient also complained of restlessness and requested PRN Ambien.

## 2017-01-26 NOTE — ANESTHESIA POSTPROCEDURE EVALUATION
Post-Anesthesia Evaluation and Assessment    Patient: Ayesha Aponte MRN: [id number]  SSN: [id number]    YOB: 1970  Age: 40 y.o. Sex: male       Cardiovascular Function/Vital Signs  Visit Vitals    BP 90/56    Pulse 75    Temp 36.1 °C (97 °F)    Resp 12    Ht 5' 9\" (1.753 m)    Wt 94 kg (207 lb 3.2 oz)    SpO2 98%    BMI 30.6 kg/m2       Patient is status post total IV anesthesia anesthesia for Procedure(s):  SIGMOIDOSCOPY FLEXIBLE/ 31/ 636  COLON BIOPSY. Nausea/Vomiting: None    Postoperative hydration reviewed and adequate. Pain:  Pain Scale 1: Numeric (0 - 10) (01/26/17 1139)  Pain Intensity 1: 0 (01/26/17 1139)   Managed    Neurological Status: At baseline    Mental Status and Level of Consciousness: Arousable    Pulmonary Status:   O2 Device: Nasal cannula (01/26/17 1257)   Adequate oxygenation and airway patent    Complications related to anesthesia: None    Post-anesthesia assessment completed.  No concerns    Signed By: Victor Hugo Juarez MD     January 26, 2017

## 2017-01-26 NOTE — ANESTHESIA PREPROCEDURE EVALUATION
Anesthetic History   No history of anesthetic complications            Review of Systems / Medical History  Patient summary reviewed, nursing notes reviewed and pertinent labs reviewed    Pulmonary  Within defined limits                 Neuro/Psych   Within defined limits           Cardiovascular                  Exercise tolerance: >4 METS  Comments: First degree AV block   GI/Hepatic/Renal               Comments: Chronic ulcerative colitis Endo/Other  Within defined limits           Other Findings            Physical Exam    Airway  Mallampati: I  TM Distance: > 6 cm  Neck ROM: normal range of motion   Mouth opening: Normal     Cardiovascular  Regular rate and rhythm,  S1 and S2 normal,  no murmur, click, rub, or gallop             Dental  No notable dental hx       Pulmonary  Breath sounds clear to auscultation               Abdominal  GI exam deferred       Other Findings            Anesthetic Plan    ASA: 2  Anesthesia type: total IV anesthesia            Anesthetic plan and risks discussed with: Patient

## 2017-01-26 NOTE — PROGRESS NOTES
TRANSFER - IN REPORT:    Verbal report received from Wing Munson RN(name) on [employer]  being received from GI(unit) for routine progression of care      Report consisted of patients Situation, Background, Assessment and   Recommendations(SBAR). Information from the following report(s) SBAR, Kardex, Procedure Summary, MAR and Recent Results was reviewed with the receiving nurse. Opportunity for questions and clarification was provided. Assessment completed upon patients arrival to unit and care assumed.

## 2017-01-26 NOTE — INTERVAL H&P NOTE
H&P Update: Valerie Mortimer was seen and examined. History and physical has been reviewed. The patient has been examined.  There have been no significant clinical changes since the completion of the originally dated History and Physical.    Signed By: Alma Delia Gar MD     January 26, 2017 12:02 PM

## 2017-01-26 NOTE — PROCEDURES
Colonoscopy Procedure Note    PreOp Diagnosis:   UC  C. Difficile colitis  Diarrhea  Severe anemia    PostOp Diagnosis:  Pseudomembranous colitis  Moderate proctitis  External hemorrhoids    Medications:  Monitored Anesthesia    Procedure:  Colonoscopy- Incomplete  Instrument:   AL  After informed consent was obtained, the patient was sedated and the colonoscope was inserted  in the anus and advanced into the Transverse colon without difficulty. The scope was slowly withdrawn while the mucosa was carefully inspected, including a retroflexed view of the rectum. Prep: poor    Findings:   Ileum: Not visualized  Colon: There was a large amount of thick liquid stool Which cannot be completely cleared. There are adherent pseudomembranes throughout the colon. Thick white exudates are able to be washed out off. The underlying mucosa is erythematous, friable, but generally without ulcerations. There is mucosal edema. Overall the appearance favors infectious colitis. The scope was not advanced further because of the degree of inflammation and the bowel prep. In the rectum there are fairly deep serpiginous ulcers present, suggesting underlying ulcerative proctitis. Biopsies were obtained from this area. Pseudomembranes are also present. Rectum: Moderate to severe proctitis, as above. On retroflexion, external hemorrhoids or visible at the anal verge. Just inside the distal rectum, there is nodularity with Prominent villous appearing mucosa. This may reflect chronic/severe inflammatory changes, with adjacent ulceration.   Separate biopsies were obtained to rule out underlying neoplastic tissue    Recommendations:  Recommend resuming IV Flagyl and increasing vancomycin  Followup pathology  Further recommendations per inpatient round team based on clinical course    Olvin Ho MD

## 2017-01-26 NOTE — PROGRESS NOTES
TRANSFER - IN REPORT:    Verbal report received from [place], [facility] (name) on Jasbir Cyr  being received from room 636 (unit) for ordered procedure. Report consisted of patients Situation, Background, Assessment and   Recommendations(SBAR). Information from the following report(s) SBAR was reviewed with the primary nurse. Opportunity for questions and clarification was provided. Awaiting transport to bring pt to the GI Lab at this time.

## 2017-01-26 NOTE — PROGRESS NOTES
Patient given warm tap water enema at 0225. Patient unable to tolerate all 500mg of water, but was able to get the majority of it and held it in for 35 minutes. Patient tolerated the procedure well and has had several BMs since.

## 2017-01-27 LAB
HCT VFR BLD AUTO: 24.9 % (ref 41.1–50.3)
HCT VFR BLD AUTO: 30.4 % (ref 41.1–50.3)
HGB BLD-MCNC: 7.5 G/DL (ref 13.6–17.2)
HGB BLD-MCNC: 9.1 G/DL (ref 13.6–17.2)

## 2017-01-27 PROCEDURE — 36415 COLL VENOUS BLD VENIPUNCTURE: CPT | Performed by: NURSE PRACTITIONER

## 2017-01-27 PROCEDURE — 74011250637 HC RX REV CODE- 250/637: Performed by: PHYSICIAN ASSISTANT

## 2017-01-27 PROCEDURE — 74011250636 HC RX REV CODE- 250/636: Performed by: INTERNAL MEDICINE

## 2017-01-27 PROCEDURE — 74011000250 HC RX REV CODE- 250: Performed by: PHYSICIAN ASSISTANT

## 2017-01-27 PROCEDURE — 65270000029 HC RM PRIVATE

## 2017-01-27 PROCEDURE — 74011636637 HC RX REV CODE- 636/637: Performed by: INTERNAL MEDICINE

## 2017-01-27 PROCEDURE — 74011250637 HC RX REV CODE- 250/637: Performed by: INTERNAL MEDICINE

## 2017-01-27 PROCEDURE — 74011250636 HC RX REV CODE- 250/636: Performed by: NURSE PRACTITIONER

## 2017-01-27 PROCEDURE — 85018 HEMOGLOBIN: CPT | Performed by: NURSE PRACTITIONER

## 2017-01-27 PROCEDURE — 74011250637 HC RX REV CODE- 250/637: Performed by: NURSE PRACTITIONER

## 2017-01-27 RX ADMIN — METRONIDAZOLE 500 MG: 500 INJECTION, SOLUTION INTRAVENOUS at 22:45

## 2017-01-27 RX ADMIN — ZOLPIDEM TARTRATE 5 MG: 5 TABLET, FILM COATED ORAL at 22:50

## 2017-01-27 RX ADMIN — VANCOMYCIN HYDROCHLORIDE 250 MG: 1 INJECTION, POWDER, LYOPHILIZED, FOR SOLUTION INTRAVENOUS at 22:45

## 2017-01-27 RX ADMIN — Medication 10 ML: at 00:32

## 2017-01-27 RX ADMIN — VANCOMYCIN HYDROCHLORIDE 250 MG: 1 INJECTION, POWDER, LYOPHILIZED, FOR SOLUTION INTRAVENOUS at 17:49

## 2017-01-27 RX ADMIN — SODIUM CHLORIDE AND POTASSIUM CHLORIDE: 9; 1.49 INJECTION, SOLUTION INTRAVENOUS at 12:54

## 2017-01-27 RX ADMIN — VANCOMYCIN HYDROCHLORIDE 250 MG: 1 INJECTION, POWDER, LYOPHILIZED, FOR SOLUTION INTRAVENOUS at 06:05

## 2017-01-27 RX ADMIN — MESALAMINE 2400 MG: 1.2 TABLET, DELAYED RELEASE ORAL at 17:50

## 2017-01-27 RX ADMIN — HYDROMORPHONE HYDROCHLORIDE 1 MG: 1 INJECTION, SOLUTION INTRAMUSCULAR; INTRAVENOUS; SUBCUTANEOUS at 00:39

## 2017-01-27 RX ADMIN — Medication 10 ML: at 06:18

## 2017-01-27 RX ADMIN — MESALAMINE 2400 MG: 1.2 TABLET, DELAYED RELEASE ORAL at 08:48

## 2017-01-27 RX ADMIN — Medication 10 ML: at 15:05

## 2017-01-27 RX ADMIN — VANCOMYCIN HYDROCHLORIDE 250 MG: 1 INJECTION, POWDER, LYOPHILIZED, FOR SOLUTION INTRAVENOUS at 12:23

## 2017-01-27 RX ADMIN — HYDROMORPHONE HYDROCHLORIDE 1 MG: 1 INJECTION, SOLUTION INTRAMUSCULAR; INTRAVENOUS; SUBCUTANEOUS at 15:03

## 2017-01-27 RX ADMIN — METRONIDAZOLE 500 MG: 500 INJECTION, SOLUTION INTRAVENOUS at 15:03

## 2017-01-27 RX ADMIN — PANTOPRAZOLE SODIUM 40 MG: 40 TABLET, DELAYED RELEASE ORAL at 06:05

## 2017-01-27 RX ADMIN — ZOLPIDEM TARTRATE 5 MG: 5 TABLET, FILM COATED ORAL at 00:46

## 2017-01-27 RX ADMIN — METRONIDAZOLE 500 MG: 500 INJECTION, SOLUTION INTRAVENOUS at 06:06

## 2017-01-27 RX ADMIN — HYDROCODONE BITARTRATE AND ACETAMINOPHEN 1 TABLET: 7.5; 325 TABLET ORAL at 06:05

## 2017-01-27 RX ADMIN — FOLIC ACID 1 MG: 1 TABLET ORAL at 08:47

## 2017-01-27 RX ADMIN — PREDNISONE 40 MG: 20 TABLET ORAL at 08:47

## 2017-01-27 RX ADMIN — Medication 10 ML: at 22:45

## 2017-01-27 RX ADMIN — VANCOMYCIN HYDROCHLORIDE 250 MG: 1 INJECTION, POWDER, LYOPHILIZED, FOR SOLUTION INTRAVENOUS at 00:32

## 2017-01-27 RX ADMIN — HYDROMORPHONE HYDROCHLORIDE 1 MG: 1 INJECTION, SOLUTION INTRAMUSCULAR; INTRAVENOUS; SUBCUTANEOUS at 22:45

## 2017-01-27 RX ADMIN — HYDROMORPHONE HYDROCHLORIDE 1 MG: 1 INJECTION, SOLUTION INTRAMUSCULAR; INTRAVENOUS; SUBCUTANEOUS at 08:45

## 2017-01-27 NOTE — PROGRESS NOTES
Patient complains of shooting and cramping abdominal and back pains rated 9/10. Given Dilaudid 1mg IV. Also complains of restlessness and insomnia, given 5mg Ambien po.

## 2017-01-28 LAB
BACTERIA SPEC CULT: NORMAL
HCT VFR BLD AUTO: 25.4 % (ref 41.1–50.3)
HCT VFR BLD AUTO: 28.2 % (ref 41.1–50.3)
HGB BLD-MCNC: 7.7 G/DL (ref 13.6–17.2)
HGB BLD-MCNC: 8.3 G/DL (ref 13.6–17.2)
SERVICE CMNT-IMP: NORMAL

## 2017-01-28 PROCEDURE — 74011000250 HC RX REV CODE- 250: Performed by: PHYSICIAN ASSISTANT

## 2017-01-28 PROCEDURE — 74011000250 HC RX REV CODE- 250: Performed by: INTERNAL MEDICINE

## 2017-01-28 PROCEDURE — 74011250637 HC RX REV CODE- 250/637: Performed by: PHYSICIAN ASSISTANT

## 2017-01-28 PROCEDURE — 74011250637 HC RX REV CODE- 250/637: Performed by: NURSE PRACTITIONER

## 2017-01-28 PROCEDURE — 74011250636 HC RX REV CODE- 250/636: Performed by: NURSE PRACTITIONER

## 2017-01-28 PROCEDURE — 36415 COLL VENOUS BLD VENIPUNCTURE: CPT | Performed by: NURSE PRACTITIONER

## 2017-01-28 PROCEDURE — 74011250637 HC RX REV CODE- 250/637: Performed by: INTERNAL MEDICINE

## 2017-01-28 PROCEDURE — 65270000029 HC RM PRIVATE

## 2017-01-28 PROCEDURE — 74011250636 HC RX REV CODE- 250/636: Performed by: INTERNAL MEDICINE

## 2017-01-28 PROCEDURE — 85018 HEMOGLOBIN: CPT | Performed by: NURSE PRACTITIONER

## 2017-01-28 PROCEDURE — 74011636637 HC RX REV CODE- 636/637: Performed by: INTERNAL MEDICINE

## 2017-01-28 RX ORDER — LIDOCAINE HYDROCHLORIDE 20 MG/ML
JELLY TOPICAL
Status: DISCONTINUED | OUTPATIENT
Start: 2017-01-28 | End: 2017-02-05

## 2017-01-28 RX ADMIN — Medication 10 ML: at 05:14

## 2017-01-28 RX ADMIN — Medication 10 ML: at 22:07

## 2017-01-28 RX ADMIN — VANCOMYCIN HYDROCHLORIDE 250 MG: 1 INJECTION, POWDER, LYOPHILIZED, FOR SOLUTION INTRAVENOUS at 12:20

## 2017-01-28 RX ADMIN — METRONIDAZOLE 500 MG: 500 INJECTION, SOLUTION INTRAVENOUS at 14:14

## 2017-01-28 RX ADMIN — METRONIDAZOLE 500 MG: 500 INJECTION, SOLUTION INTRAVENOUS at 22:07

## 2017-01-28 RX ADMIN — PANTOPRAZOLE SODIUM 40 MG: 40 TABLET, DELAYED RELEASE ORAL at 06:16

## 2017-01-28 RX ADMIN — HYDROCODONE BITARTRATE AND ACETAMINOPHEN 1 TABLET: 7.5; 325 TABLET ORAL at 07:09

## 2017-01-28 RX ADMIN — Medication 5 ML: at 14:00

## 2017-01-28 RX ADMIN — FOLIC ACID 1 MG: 1 TABLET ORAL at 09:00

## 2017-01-28 RX ADMIN — HYDROMORPHONE HYDROCHLORIDE 1 MG: 1 INJECTION, SOLUTION INTRAMUSCULAR; INTRAVENOUS; SUBCUTANEOUS at 05:14

## 2017-01-28 RX ADMIN — MESALAMINE 2400 MG: 1.2 TABLET, DELAYED RELEASE ORAL at 08:00

## 2017-01-28 RX ADMIN — MESALAMINE 2400 MG: 1.2 TABLET, DELAYED RELEASE ORAL at 18:55

## 2017-01-28 RX ADMIN — HYDROMORPHONE HYDROCHLORIDE 1 MG: 1 INJECTION, SOLUTION INTRAMUSCULAR; INTRAVENOUS; SUBCUTANEOUS at 10:53

## 2017-01-28 RX ADMIN — LIDOCAINE HYDROCHLORIDE: 20 JELLY TOPICAL at 10:49

## 2017-01-28 RX ADMIN — DICYCLOMINE HYDROCHLORIDE 10 MG: 10 CAPSULE ORAL at 16:55

## 2017-01-28 RX ADMIN — DICYCLOMINE HYDROCHLORIDE 10 MG: 10 CAPSULE ORAL at 08:00

## 2017-01-28 RX ADMIN — PREDNISONE 40 MG: 20 TABLET ORAL at 09:05

## 2017-01-28 RX ADMIN — VANCOMYCIN HYDROCHLORIDE 250 MG: 1 INJECTION, POWDER, LYOPHILIZED, FOR SOLUTION INTRAVENOUS at 18:55

## 2017-01-28 RX ADMIN — HYDROMORPHONE HYDROCHLORIDE 1 MG: 1 INJECTION, SOLUTION INTRAMUSCULAR; INTRAVENOUS; SUBCUTANEOUS at 16:55

## 2017-01-28 RX ADMIN — SODIUM CHLORIDE AND POTASSIUM CHLORIDE: 9; 1.49 INJECTION, SOLUTION INTRAVENOUS at 15:00

## 2017-01-28 RX ADMIN — DICYCLOMINE HYDROCHLORIDE 10 MG: 10 CAPSULE ORAL at 03:35

## 2017-01-28 RX ADMIN — VANCOMYCIN HYDROCHLORIDE 250 MG: 1 INJECTION, POWDER, LYOPHILIZED, FOR SOLUTION INTRAVENOUS at 06:16

## 2017-01-28 RX ADMIN — METRONIDAZOLE 500 MG: 500 INJECTION, SOLUTION INTRAVENOUS at 05:14

## 2017-01-28 NOTE — PROGRESS NOTES
Patient complained of pain rated 10/10, describes it as shooting, sharp, and cramping. He was given Dilaudid 1mg IV.

## 2017-01-28 NOTE — PROGRESS NOTES
GI DAILY PROGRESS NOTE    Admit Date:  1/18/2017    Today's Date:  1/28/2017    CC:  Diarrhea and C. Difficile; UC    Subjective:     Patient states 4 BMs since yesterday. Denies any bleeding. States that abdominal pain and rectal pain are not improved with PO pain medications. Reports that pain is not improving. Reports that IV pain medications are helpful, but only for short time. He is using Hydrocortisone 2.5% rectal cream without much improvement. Temp 99F. HGB 7.7 today (HGB 9.1 on 1/27/17). Blood cultures 1/26/17 show no growth after 2 days, final results pending. Denies any chest pain or SOB. Pathology dated 1/26/17 revealed rectal bx with focal ulceration with acute and chronic inflammation. Rectal nodule biopsies c/x hyperplastic polyps, focally ulcerated, and acutely inflamed, also shows changes suspicious for human papilloma virus effect. Colonoscopy Procedure Note 1/26/17  PreOp Diagnosis:   UC  C.  Difficile colitis  Diarrhea  Severe anemia     PostOp Diagnosis:  Pseudomembranous colitis  Moderate proctitis  External hemorrhoids    Medications:   Current Facility-Administered Medications   Medication Dose Route Frequency    vancomycin 50 mg/mL oral solution (compounded) 250 mg  250 mg Oral Q6H    metroNIDAZOLE (FLAGYL) IVPB premix 500 mg  500 mg IntraVENous Q8H    hydrocortisone (ANUSOL-HC) suppository 25 mg  25 mg Rectal Q12H PRN    HYDROcodone-acetaminophen (NORCO) 7.5-325 mg per tablet 1 Tab  1 Tab Oral Q4H PRN    pantoprazole (PROTONIX) tablet 40 mg  40 mg Oral ACB    predniSONE (DELTASONE) tablet 40 mg  40 mg Oral DAILY WITH BREAKFAST    dicyclomine (BENTYL) capsule 10 mg  10 mg Oral QID PRN    calcium carbonate (TUMS) chewable tablet 200 mg [elemental]  200 mg Oral TID PRN    alum-mag hydroxide-simeth (MYLANTA) oral suspension 30 mL  30 mL Oral Q4H PRN    0.9% sodium chloride infusion 250 mL  250 mL IntraVENous PRN    mesalamine (LIALDA) delayed release tablet 2,400 mg- PATIENT SUPPLIED  2.4 g Oral BID WITH MEALS    folic acid (FOLVITE) tablet 1 mg  1 mg Oral DAILY    promethazine (PHENERGAN) tablet 12.5 mg  12.5 mg Oral Q6H PRN    sodium chloride (NS) flush 5-10 mL  5-10 mL IntraVENous Q8H    sodium chloride (NS) flush 5-10 mL  5-10 mL IntraVENous PRN    ondansetron (ZOFRAN) injection 4 mg  4 mg IntraVENous Q4H PRN    zolpidem (AMBIEN) tablet 5 mg  5 mg Oral QHS PRN    0.9% sodium chloride with KCl 20 mEq/L infusion   IntraVENous CONTINUOUS    HYDROmorphone (PF) (DILAUDID) injection 1 mg  1 mg IntraVENous Q6H PRN    acetaminophen (TYLENOL) tablet 650 mg  650 mg Oral Q4H PRN       Review of Systems:  ROS was obtained, with pertinent positives as listed above. No chest pain or SOB. Diet:  GI soft/Low residue    Objective:   Vitals:  Visit Vitals    /67    Pulse (!) 117    Temp 99 °F (37.2 °C)    Resp 18    Ht 5' 9\" (1.753 m)    Wt 94 kg (207 lb 3.2 oz)    SpO2 96%    BMI 30.6 kg/m2     Intake/Output:     01/26 1901 - 01/28 0700  In: 9762 [P.O.:1840; I.V.:1081]  Out: -   Exam:  General appearance: alert, cooperative, no distress   Lungs: clear to auscultation bilaterally anteriorly  Heart: regular rate and rhythm. No MCGR  Abdomen: soft, Mild TTP lower abdomen. No rebound.   Bowel sounds normal. No masses, no organomegaly  Neuro:  alert and oriented X4    Data Review (Labs):    Recent Labs      01/28/17   0740  01/27/17   2020  01/27/17   1038  01/26/17   2018  01/26/17   0725  01/25/17 1952   HGB  7.7*  9.1*  7.5*  8.1*  7.5*  7.7*   HCT  25.4*  30.4*  24.9*  27.1*  25.1*  25.4*       Assessment:     Principal Problem:    Ulcerative colitis ([facility] [address] (1/18/2017)    Active Problems:    Rectal bleed (12/1/2016)      Abdominal pain (12/1/2016)      Fever (1/12/2017)      Acute blood loss anemia (1/18/2017)      Clostridium difficile diarrhea (1/18/2017)       39 yo male who we admitted due to hypotension, increased diarrhea with C.diff and UC, ABLA with drop in HGB from 8.4 to 7.0 on admission1/18/17. He was started on Remicade therapy during a previous hospitalization on 12/9/16, and received the second dose on 12/22/16. He is due for 3rd dose on 1/19/17, which we are holding due to [address] [place] [address] 1/12/17. CRP elevated at 12 on 1/18/17. Our office is aware. He was currently on Lialda and Solu Medrol. Transitioned oral steroid with Prednisone 40mg daily 1/23/17 and oral antibiotics (was on po Flagyl & Vanc- De-escalated ABX to vancomycin only on 1/24/17) (Had WBCs 26.8 on admit), treat 14 days. He dropped his HGB 6.5 and required transfusion on 1/18/17. His most recent Hgb 1/24/17 is 7.5 which is improved from 7.4 & 7.2 on 1/23/17. KUB w nonspecific bowel gas pattern. WBC 15.9 on 1/21/17, improved 1/23 to 9.3, increased to around 13 on 1/24/17. Leukocytosis felt likely secondary to steroid therapy. Cultures for blood and urine negative on 1/18/17. He underwent Flexible sigmoidoscopy on 1/20/17 by Dr. Darin Oreilly which revealed mucosa with white exudate and few areas with possible pseudomembrane appearance. However, likely background of active UC as well. Was only very gradually improving. S/p Flex sig 1/26/17. See findings as outlined above. IV Flagyl added 1/26 and PO Van dosing increased following Flex sig.         Plan: 1. Continue GI soft/Low residue diet  2. Bentyl 10mg one po TID for abdominal cramping   3. Continue Prednisone 40mg one tab po daily. 4. Continue Mesalamine. Overdue for Remicade, plan to restart after C diff treatment  5. Continue oral Vancomycin 250mg q.i.d and IV Flagyl 500mg t.i.d.   6. p.o Norco as needed for abdominal pain. IV Dilaudid for severe pain. Encouraged to use oral pain med instead of IV as much as possible. 7. Pathology revealed rectal biopsy/rectal nodule biopsy with ulceration, inflammation, and changes suspicious for HPV effect. 8.Continue to follow trend of H/H. Hgb remains low- appears stable and improving. He feels ok.   No plan for transfusion pRBC at this time. Monitor with transfusions as needed.       MELL Betts  [facility]

## 2017-01-28 NOTE — PROGRESS NOTES
Pt c/o pain and cramping 6/10 in abdomen when \"lying here doing nothing. \" c/o pain 9/10 around rectum. Pt has received IV dilaudid 1mg at 0514, norco 7.5 mg at 0709. Pt would like something for cramping. Bentyl 10mg PO given for cramping.

## 2017-01-29 LAB
ANION GAP BLD CALC-SCNC: 8 MMOL/L (ref 7–16)
BUN SERPL-MCNC: 4 MG/DL (ref 6–23)
CALCIUM SERPL-MCNC: 7.8 MG/DL (ref 8.3–10.4)
CHLORIDE SERPL-SCNC: 108 MMOL/L (ref 98–107)
CO2 SERPL-SCNC: 27 MMOL/L (ref 21–32)
CREAT SERPL-MCNC: 0.66 MG/DL (ref 0.8–1.5)
GLUCOSE SERPL-MCNC: 90 MG/DL (ref 65–100)
HCT VFR BLD AUTO: 27.4 % (ref 41.1–50.3)
HGB BLD-MCNC: 8 G/DL (ref 13.6–17.2)
POTASSIUM SERPL-SCNC: 4.1 MMOL/L (ref 3.5–5.1)
SODIUM SERPL-SCNC: 143 MMOL/L (ref 136–145)

## 2017-01-29 PROCEDURE — 74011250636 HC RX REV CODE- 250/636: Performed by: INTERNAL MEDICINE

## 2017-01-29 PROCEDURE — 74011250637 HC RX REV CODE- 250/637: Performed by: PHYSICIAN ASSISTANT

## 2017-01-29 PROCEDURE — 74011250637 HC RX REV CODE- 250/637: Performed by: NURSE PRACTITIONER

## 2017-01-29 PROCEDURE — 36415 COLL VENOUS BLD VENIPUNCTURE: CPT | Performed by: NURSE PRACTITIONER

## 2017-01-29 PROCEDURE — 74011000250 HC RX REV CODE- 250: Performed by: PHYSICIAN ASSISTANT

## 2017-01-29 PROCEDURE — 65270000029 HC RM PRIVATE

## 2017-01-29 PROCEDURE — 74011636637 HC RX REV CODE- 636/637: Performed by: INTERNAL MEDICINE

## 2017-01-29 PROCEDURE — 74011250637 HC RX REV CODE- 250/637: Performed by: INTERNAL MEDICINE

## 2017-01-29 PROCEDURE — 74011250636 HC RX REV CODE- 250/636: Performed by: NURSE PRACTITIONER

## 2017-01-29 PROCEDURE — 85018 HEMOGLOBIN: CPT | Performed by: NURSE PRACTITIONER

## 2017-01-29 PROCEDURE — 80048 BASIC METABOLIC PNL TOTAL CA: CPT | Performed by: INTERNAL MEDICINE

## 2017-01-29 RX ORDER — HYDROMORPHONE HYDROCHLORIDE 1 MG/ML
1.5 INJECTION, SOLUTION INTRAMUSCULAR; INTRAVENOUS; SUBCUTANEOUS
Status: DISCONTINUED | OUTPATIENT
Start: 2017-01-29 | End: 2017-02-04

## 2017-01-29 RX ADMIN — HYDROMORPHONE HYDROCHLORIDE 1 MG: 1 INJECTION, SOLUTION INTRAMUSCULAR; INTRAVENOUS; SUBCUTANEOUS at 05:23

## 2017-01-29 RX ADMIN — ZOLPIDEM TARTRATE 5 MG: 5 TABLET, FILM COATED ORAL at 01:33

## 2017-01-29 RX ADMIN — PREDNISONE 40 MG: 20 TABLET ORAL at 09:04

## 2017-01-29 RX ADMIN — Medication 5 ML: at 14:00

## 2017-01-29 RX ADMIN — HYDROMORPHONE HYDROCHLORIDE 1.5 MG: 1 INJECTION, SOLUTION INTRAMUSCULAR; INTRAVENOUS; SUBCUTANEOUS at 18:40

## 2017-01-29 RX ADMIN — PANTOPRAZOLE SODIUM 40 MG: 40 TABLET, DELAYED RELEASE ORAL at 07:30

## 2017-01-29 RX ADMIN — SODIUM CHLORIDE AND POTASSIUM CHLORIDE: 9; 1.49 INJECTION, SOLUTION INTRAVENOUS at 01:33

## 2017-01-29 RX ADMIN — METRONIDAZOLE 500 MG: 500 INJECTION, SOLUTION INTRAVENOUS at 05:14

## 2017-01-29 RX ADMIN — VANCOMYCIN HYDROCHLORIDE 250 MG: 1 INJECTION, POWDER, LYOPHILIZED, FOR SOLUTION INTRAVENOUS at 00:57

## 2017-01-29 RX ADMIN — VANCOMYCIN HYDROCHLORIDE 250 MG: 1 INJECTION, POWDER, LYOPHILIZED, FOR SOLUTION INTRAVENOUS at 18:00

## 2017-01-29 RX ADMIN — DICYCLOMINE HYDROCHLORIDE 10 MG: 10 CAPSULE ORAL at 09:04

## 2017-01-29 RX ADMIN — METRONIDAZOLE 500 MG: 500 INJECTION, SOLUTION INTRAVENOUS at 21:55

## 2017-01-29 RX ADMIN — FOLIC ACID 1 MG: 1 TABLET ORAL at 09:04

## 2017-01-29 RX ADMIN — HYDROMORPHONE HYDROCHLORIDE 1 MG: 1 INJECTION, SOLUTION INTRAMUSCULAR; INTRAVENOUS; SUBCUTANEOUS at 00:26

## 2017-01-29 RX ADMIN — Medication 5 ML: at 21:55

## 2017-01-29 RX ADMIN — SODIUM CHLORIDE AND POTASSIUM CHLORIDE: 9; 1.49 INJECTION, SOLUTION INTRAVENOUS at 05:14

## 2017-01-29 RX ADMIN — MESALAMINE 2400 MG: 1.2 TABLET, DELAYED RELEASE ORAL at 08:00

## 2017-01-29 RX ADMIN — ONDANSETRON 4 MG: 2 INJECTION INTRAMUSCULAR; INTRAVENOUS at 09:04

## 2017-01-29 RX ADMIN — VANCOMYCIN HYDROCHLORIDE 250 MG: 1 INJECTION, POWDER, LYOPHILIZED, FOR SOLUTION INTRAVENOUS at 12:15

## 2017-01-29 RX ADMIN — VANCOMYCIN HYDROCHLORIDE 250 MG: 1 INJECTION, POWDER, LYOPHILIZED, FOR SOLUTION INTRAVENOUS at 05:14

## 2017-01-29 RX ADMIN — METRONIDAZOLE 500 MG: 500 INJECTION, SOLUTION INTRAVENOUS at 14:22

## 2017-01-29 RX ADMIN — MESALAMINE 2400 MG: 1.2 TABLET, DELAYED RELEASE ORAL at 17:00

## 2017-01-29 RX ADMIN — HYDROCODONE BITARTRATE AND ACETAMINOPHEN 1 TABLET: 7.5; 325 TABLET ORAL at 09:04

## 2017-01-29 RX ADMIN — HYDROMORPHONE HYDROCHLORIDE 1.5 MG: 1 INJECTION, SOLUTION INTRAMUSCULAR; INTRAVENOUS; SUBCUTANEOUS at 12:15

## 2017-01-29 RX ADMIN — Medication 10 ML: at 06:12

## 2017-01-29 NOTE — PROGRESS NOTES
GI DAILY PROGRESS NOTE    Admit Date:  1/18/2017    Today's Date:  1/29/2017    CC:  UC, C diff    Subjective:     Patient complains of rectal pain, lower abdominal pain and continued diarrhea. Feels poorly. Little appetite. Medications:   Current Facility-Administered Medications   Medication Dose Route Frequency    lidocaine (XYLOCAINE) 2 % jelly   Other QID PRN    vancomycin 50 mg/mL oral solution (compounded) 250 mg  250 mg Oral Q6H    metroNIDAZOLE (FLAGYL) IVPB premix 500 mg  500 mg IntraVENous Q8H    hydrocortisone (ANUSOL-HC) suppository 25 mg  25 mg Rectal Q12H PRN    HYDROcodone-acetaminophen (NORCO) 7.5-325 mg per tablet 1 Tab  1 Tab Oral Q4H PRN    pantoprazole (PROTONIX) tablet 40 mg  40 mg Oral ACB    predniSONE (DELTASONE) tablet 40 mg  40 mg Oral DAILY WITH BREAKFAST    dicyclomine (BENTYL) capsule 10 mg  10 mg Oral QID PRN    calcium carbonate (TUMS) chewable tablet 200 mg [elemental]  200 mg Oral TID PRN    alum-mag hydroxide-simeth (MYLANTA) oral suspension 30 mL  30 mL Oral Q4H PRN    0.9% sodium chloride infusion 250 mL  250 mL IntraVENous PRN    mesalamine (LIALDA) delayed release tablet 2,400 mg- PATIENT SUPPLIED  2.4 g Oral BID WITH MEALS    folic acid (FOLVITE) tablet 1 mg  1 mg Oral DAILY    promethazine (PHENERGAN) tablet 12.5 mg  12.5 mg Oral Q6H PRN    sodium chloride (NS) flush 5-10 mL  5-10 mL IntraVENous Q8H    sodium chloride (NS) flush 5-10 mL  5-10 mL IntraVENous PRN    ondansetron (ZOFRAN) injection 4 mg  4 mg IntraVENous Q4H PRN    zolpidem (AMBIEN) tablet 5 mg  5 mg Oral QHS PRN    0.9% sodium chloride with KCl 20 mEq/L infusion   IntraVENous CONTINUOUS    HYDROmorphone (PF) (DILAUDID) injection 1 mg  1 mg IntraVENous Q6H PRN    acetaminophen (TYLENOL) tablet 650 mg  650 mg Oral Q4H PRN       Review of Systems:  ROS was obtained, with pertinent positives as listed above. No chest pain or SOB.     Diet:      Objective:   Vitals:  Visit Vitals    BP 132/74    Pulse 92    Temp 99 °F (37.2 °C)    Resp 16    Ht 5' 9\" (1.753 m)    Wt 94 kg (207 lb 3.2 oz)    SpO2 95%    BMI 30.6 kg/m2     Intake/Output:     01/27 1901 - 01/29 0700  In: 3819 [P.O.:840; I.V.:2860]  Out: -   Exam:  General appearance: alert, cooperative, no distress  Lungs: clear to auscultation bilaterally anteriorly  Heart: regular rate and rhythm  Abdomen: soft, mild truncal obesity, TTP, no rebound, no distension, NABS  Extremities: extremities normal, atraumatic, no cyanosis or edema  Neuro:  alert and oriented  Rectal: Lesions on his exterior anal canal suspicious for anal condyloma, skin tags    Data Review (Labs):    Recent Labs      01/29/17   0736  01/28/17   1850  01/28/17   0740  01/27/17   2020  01/27/17   1038  01/26/17 2018   HGB  8.0*  8.3*  7.7*  9.1*  7.5*  8.1*   HCT  27.4*  28.2*  25.4*  30.4*  24.9*  27.1*       Assessment:     Principal Problem:    Ulcerative colitis ([address] (1/18/2017)    Active Problems:    Rectal bleed (12/1/2016)      Abdominal pain (12/1/2016)      Fever (1/12/2017)      Acute blood loss anemia (1/18/2017)      Clostridium difficile diarrhea (1/18/2017)    Pathology revealed rectal biopsy/rectal nodule biopsy with ulceration, inflammation, and changes suspicious for HPV effect.    Pseudomembranes 1/26 on colonoscopy  Increased Vancomycin 250 q6 and added back Flagyl 500 q8 on 1/26, but symptoms have not improved    Plan:   Repeat flex sig to see if pseudomembranes still present  If no response, consider Dificid or stool transplant - would have to get institutional exception to protocol  Overdue for 3rd dose remicade, dose when infection cleared  Nodule with HPV suspicious changes and external anal canal lesions will need re-evaluation when inflammation controlled    Eliezer Dukes MD  [facility], [place]

## 2017-01-29 NOTE — PROGRESS NOTES
Pt states dilaudid helped abd pain but his hemorrhoids are very inflamed he stated that the cream doesn't really help much and could he be assisted with a sitz bath. Pt set up and was able to do own sitz bath with relief. Bleeding noted on tissue in trash can pt states he has had some bleeding but not more than normal.  Will continue to monitor. 0234 pt resting no acute distress.

## 2017-01-30 ENCOUNTER — SURGERY (OUTPATIENT)
Age: 47
End: 2017-01-30

## 2017-01-30 ENCOUNTER — ANESTHESIA EVENT (OUTPATIENT)
Dept: ENDOSCOPY | Age: 47
DRG: 372 | End: 2017-01-30
Payer: COMMERCIAL

## 2017-01-30 ENCOUNTER — ANESTHESIA (OUTPATIENT)
Dept: ENDOSCOPY | Age: 47
DRG: 372 | End: 2017-01-30
Payer: COMMERCIAL

## 2017-01-30 LAB
ANION GAP BLD CALC-SCNC: 10 MMOL/L (ref 7–16)
BASOPHILS # BLD AUTO: 0 K/UL (ref 0–0.2)
BASOPHILS # BLD: 0 % (ref 0–2)
BUN SERPL-MCNC: 3 MG/DL (ref 6–23)
CALCIUM SERPL-MCNC: 8.1 MG/DL (ref 8.3–10.4)
CHLORIDE SERPL-SCNC: 106 MMOL/L (ref 98–107)
CO2 SERPL-SCNC: 24 MMOL/L (ref 21–32)
CREAT SERPL-MCNC: 0.56 MG/DL (ref 0.8–1.5)
DIFFERENTIAL METHOD BLD: ABNORMAL
EOSINOPHIL # BLD: 0.1 K/UL (ref 0–0.8)
EOSINOPHIL NFR BLD: 1 % (ref 0.5–7.8)
ERYTHROCYTE [DISTWIDTH] IN BLOOD BY AUTOMATED COUNT: 20.4 % (ref 11.9–14.6)
GLUCOSE SERPL-MCNC: 76 MG/DL (ref 65–100)
HCT VFR BLD AUTO: 25.6 % (ref 41.1–50.3)
HGB BLD-MCNC: 7.5 G/DL (ref 13.6–17.2)
IMM GRANULOCYTES # BLD: 0.1 K/UL (ref 0–0.5)
IMM GRANULOCYTES NFR BLD AUTO: 0.4 % (ref 0–5)
LYMPHOCYTES # BLD AUTO: 14 % (ref 13–44)
LYMPHOCYTES # BLD: 1.6 K/UL (ref 0.5–4.6)
MCH RBC QN AUTO: 24.4 PG (ref 26.1–32.9)
MCHC RBC AUTO-ENTMCNC: 29.3 G/DL (ref 31.4–35)
MCV RBC AUTO: 83.4 FL (ref 79.6–97.8)
MONOCYTES # BLD: 1.2 K/UL (ref 0.1–1.3)
MONOCYTES NFR BLD AUTO: 11 % (ref 4–12)
NEUTS SEG # BLD: 8.5 K/UL (ref 1.7–8.2)
NEUTS SEG NFR BLD AUTO: 74 % (ref 43–78)
PLATELET # BLD AUTO: 539 K/UL (ref 150–450)
PMV BLD AUTO: 8.9 FL (ref 10.8–14.1)
POTASSIUM SERPL-SCNC: 3.8 MMOL/L (ref 3.5–5.1)
RBC # BLD AUTO: 3.07 M/UL (ref 4.23–5.67)
SODIUM SERPL-SCNC: 140 MMOL/L (ref 136–145)
WBC # BLD AUTO: 11.5 K/UL (ref 4.3–11.1)

## 2017-01-30 PROCEDURE — 76060000031 HC ANESTHESIA FIRST 0.5 HR: Performed by: INTERNAL MEDICINE

## 2017-01-30 PROCEDURE — 36415 COLL VENOUS BLD VENIPUNCTURE: CPT | Performed by: INTERNAL MEDICINE

## 2017-01-30 PROCEDURE — 65270000029 HC RM PRIVATE

## 2017-01-30 PROCEDURE — 74011250636 HC RX REV CODE- 250/636: Performed by: INTERNAL MEDICINE

## 2017-01-30 PROCEDURE — 74011250637 HC RX REV CODE- 250/637: Performed by: PHYSICIAN ASSISTANT

## 2017-01-30 PROCEDURE — 74011636637 HC RX REV CODE- 636/637: Performed by: INTERNAL MEDICINE

## 2017-01-30 PROCEDURE — 0DJD8ZZ INSPECTION OF LOWER INTESTINAL TRACT, VIA NATURAL OR ARTIFICIAL OPENING ENDOSCOPIC: ICD-10-PCS | Performed by: INTERNAL MEDICINE

## 2017-01-30 PROCEDURE — 76040000025: Performed by: INTERNAL MEDICINE

## 2017-01-30 PROCEDURE — 80048 BASIC METABOLIC PNL TOTAL CA: CPT | Performed by: INTERNAL MEDICINE

## 2017-01-30 PROCEDURE — 77030009426 HC FCPS BIOP ENDOSC BSC -B: Performed by: INTERNAL MEDICINE

## 2017-01-30 PROCEDURE — 74011000250 HC RX REV CODE- 250

## 2017-01-30 PROCEDURE — 74011250636 HC RX REV CODE- 250/636: Performed by: ANESTHESIOLOGY

## 2017-01-30 PROCEDURE — 85025 COMPLETE CBC W/AUTO DIFF WBC: CPT | Performed by: INTERNAL MEDICINE

## 2017-01-30 PROCEDURE — 74011000250 HC RX REV CODE- 250: Performed by: INTERNAL MEDICINE

## 2017-01-30 PROCEDURE — 74011250637 HC RX REV CODE- 250/637: Performed by: NURSE PRACTITIONER

## 2017-01-30 PROCEDURE — 74011250637 HC RX REV CODE- 250/637: Performed by: INTERNAL MEDICINE

## 2017-01-30 PROCEDURE — 88305 TISSUE EXAM BY PATHOLOGIST: CPT | Performed by: INTERNAL MEDICINE

## 2017-01-30 PROCEDURE — 74011250636 HC RX REV CODE- 250/636

## 2017-01-30 PROCEDURE — 74011000250 HC RX REV CODE- 250: Performed by: PHYSICIAN ASSISTANT

## 2017-01-30 RX ORDER — SODIUM CHLORIDE, SODIUM LACTATE, POTASSIUM CHLORIDE, CALCIUM CHLORIDE 600; 310; 30; 20 MG/100ML; MG/100ML; MG/100ML; MG/100ML
100 INJECTION, SOLUTION INTRAVENOUS CONTINUOUS
Status: DISCONTINUED | OUTPATIENT
Start: 2017-01-30 | End: 2017-02-01

## 2017-01-30 RX ORDER — LIDOCAINE HYDROCHLORIDE 20 MG/ML
INJECTION, SOLUTION EPIDURAL; INFILTRATION; INTRACAUDAL; PERINEURAL AS NEEDED
Status: DISCONTINUED | OUTPATIENT
Start: 2017-01-30 | End: 2017-01-30 | Stop reason: HOSPADM

## 2017-01-30 RX ORDER — LIDOCAINE 50 MG/G
OINTMENT TOPICAL AS NEEDED
Status: DISCONTINUED | OUTPATIENT
Start: 2017-01-30 | End: 2017-02-05

## 2017-01-30 RX ORDER — SODIUM CHLORIDE, SODIUM LACTATE, POTASSIUM CHLORIDE, CALCIUM CHLORIDE 600; 310; 30; 20 MG/100ML; MG/100ML; MG/100ML; MG/100ML
100 INJECTION, SOLUTION INTRAVENOUS CONTINUOUS
Status: CANCELLED | OUTPATIENT
Start: 2017-01-30

## 2017-01-30 RX ORDER — PROPOFOL 10 MG/ML
INJECTION, EMULSION INTRAVENOUS AS NEEDED
Status: DISCONTINUED | OUTPATIENT
Start: 2017-01-30 | End: 2017-01-30 | Stop reason: HOSPADM

## 2017-01-30 RX ORDER — PROPOFOL 10 MG/ML
INJECTION, EMULSION INTRAVENOUS AS NEEDED
Status: DISCONTINUED | OUTPATIENT
Start: 2017-01-30 | End: 2017-01-30

## 2017-01-30 RX ORDER — SODIUM CHLORIDE 0.9 % (FLUSH) 0.9 %
5-10 SYRINGE (ML) INJECTION AS NEEDED
Status: CANCELLED | OUTPATIENT
Start: 2017-01-30

## 2017-01-30 RX ADMIN — HYDROMORPHONE HYDROCHLORIDE 1.5 MG: 1 INJECTION, SOLUTION INTRAMUSCULAR; INTRAVENOUS; SUBCUTANEOUS at 23:45

## 2017-01-30 RX ADMIN — ZOLPIDEM TARTRATE 5 MG: 5 TABLET, FILM COATED ORAL at 01:48

## 2017-01-30 RX ADMIN — SODIUM CHLORIDE AND POTASSIUM CHLORIDE: 9; 1.49 INJECTION, SOLUTION INTRAVENOUS at 19:56

## 2017-01-30 RX ADMIN — PROPOFOL 30 MG: 10 INJECTION, EMULSION INTRAVENOUS at 13:44

## 2017-01-30 RX ADMIN — LIDOCAINE HYDROCHLORIDE: 20 JELLY TOPICAL at 08:35

## 2017-01-30 RX ADMIN — PROPOFOL 30 MG: 10 INJECTION, EMULSION INTRAVENOUS at 13:48

## 2017-01-30 RX ADMIN — MESALAMINE 2400 MG: 1.2 TABLET, DELAYED RELEASE ORAL at 18:00

## 2017-01-30 RX ADMIN — HYDROMORPHONE HYDROCHLORIDE 1.5 MG: 1 INJECTION, SOLUTION INTRAMUSCULAR; INTRAVENOUS; SUBCUTANEOUS at 15:45

## 2017-01-30 RX ADMIN — METRONIDAZOLE 500 MG: 500 INJECTION, SOLUTION INTRAVENOUS at 15:45

## 2017-01-30 RX ADMIN — HYDROMORPHONE HYDROCHLORIDE 1.5 MG: 1 INJECTION, SOLUTION INTRAMUSCULAR; INTRAVENOUS; SUBCUTANEOUS at 19:55

## 2017-01-30 RX ADMIN — FOLIC ACID 1 MG: 1 TABLET ORAL at 08:35

## 2017-01-30 RX ADMIN — METRONIDAZOLE 500 MG: 500 INJECTION, SOLUTION INTRAVENOUS at 21:34

## 2017-01-30 RX ADMIN — MESALAMINE 2400 MG: 1.2 TABLET, DELAYED RELEASE ORAL at 08:36

## 2017-01-30 RX ADMIN — VANCOMYCIN HYDROCHLORIDE 250 MG: 1 INJECTION, POWDER, LYOPHILIZED, FOR SOLUTION INTRAVENOUS at 15:45

## 2017-01-30 RX ADMIN — Medication 5 ML: at 06:09

## 2017-01-30 RX ADMIN — HYDROMORPHONE HYDROCHLORIDE 1.5 MG: 1 INJECTION, SOLUTION INTRAMUSCULAR; INTRAVENOUS; SUBCUTANEOUS at 05:14

## 2017-01-30 RX ADMIN — VANCOMYCIN HYDROCHLORIDE 250 MG: 1 INJECTION, POWDER, LYOPHILIZED, FOR SOLUTION INTRAVENOUS at 23:40

## 2017-01-30 RX ADMIN — HYDROMORPHONE HYDROCHLORIDE 1.5 MG: 1 INJECTION, SOLUTION INTRAMUSCULAR; INTRAVENOUS; SUBCUTANEOUS at 08:55

## 2017-01-30 RX ADMIN — HYDROCODONE BITARTRATE AND ACETAMINOPHEN 1 TABLET: 7.5; 325 TABLET ORAL at 11:35

## 2017-01-30 RX ADMIN — PROPOFOL 70 MG: 10 INJECTION, EMULSION INTRAVENOUS at 13:40

## 2017-01-30 RX ADMIN — PROMETHAZINE HYDROCHLORIDE 12.5 MG: 25 TABLET ORAL at 23:46

## 2017-01-30 RX ADMIN — VANCOMYCIN HYDROCHLORIDE 250 MG: 1 INJECTION, POWDER, LYOPHILIZED, FOR SOLUTION INTRAVENOUS at 00:02

## 2017-01-30 RX ADMIN — PANTOPRAZOLE SODIUM 40 MG: 40 TABLET, DELAYED RELEASE ORAL at 05:44

## 2017-01-30 RX ADMIN — HYDROMORPHONE HYDROCHLORIDE 1.5 MG: 1 INJECTION, SOLUTION INTRAMUSCULAR; INTRAVENOUS; SUBCUTANEOUS at 01:14

## 2017-01-30 RX ADMIN — Medication 10 ML: at 21:34

## 2017-01-30 RX ADMIN — SODIUM CHLORIDE AND POTASSIUM CHLORIDE: 9; 1.49 INJECTION, SOLUTION INTRAVENOUS at 05:42

## 2017-01-30 RX ADMIN — LIDOCAINE HYDROCHLORIDE 40 MG: 20 INJECTION, SOLUTION EPIDURAL; INFILTRATION; INTRACAUDAL; PERINEURAL at 13:40

## 2017-01-30 RX ADMIN — PROPOFOL 30 MG: 10 INJECTION, EMULSION INTRAVENOUS at 13:46

## 2017-01-30 RX ADMIN — SODIUM CHLORIDE, SODIUM LACTATE, POTASSIUM CHLORIDE, AND CALCIUM CHLORIDE 100 ML/HR: 600; 310; 30; 20 INJECTION, SOLUTION INTRAVENOUS at 13:26

## 2017-01-30 RX ADMIN — METRONIDAZOLE 500 MG: 500 INJECTION, SOLUTION INTRAVENOUS at 06:00

## 2017-01-30 RX ADMIN — PROPOFOL 40 MG: 10 INJECTION, EMULSION INTRAVENOUS at 13:42

## 2017-01-30 RX ADMIN — VANCOMYCIN HYDROCHLORIDE 250 MG: 1 INJECTION, POWDER, LYOPHILIZED, FOR SOLUTION INTRAVENOUS at 06:00

## 2017-01-30 RX ADMIN — VANCOMYCIN HYDROCHLORIDE 250 MG: 1 INJECTION, POWDER, LYOPHILIZED, FOR SOLUTION INTRAVENOUS at 17:59

## 2017-01-30 RX ADMIN — PREDNISONE 40 MG: 20 TABLET ORAL at 08:36

## 2017-01-30 RX ADMIN — Medication 10 ML: at 15:47

## 2017-01-30 NOTE — PROGRESS NOTES
TRANSFER - OUT REPORT:    Verbal report given to GERRY Dahl(name) on [facility]  being transferred to GI Lab(unit) for ordered procedure       Report consisted of patients Situation, Background, Assessment and   Recommendations(SBAR). Information from the following report(s) SBAR was reviewed with the receiving nurse. Lines:   Peripheral IV 01/29/17 Left; Inner Forearm (Active)   Site Assessment Clean, dry, & intact 1/30/2017  7:48 AM   Phlebitis Assessment 0 1/30/2017  7:48 AM   Infiltration Assessment 0 1/30/2017  7:48 AM   Dressing Status Clean, dry, & intact 1/30/2017  7:48 AM   Dressing Type Transparent;Tape 1/30/2017  7:48 AM   Hub Color/Line Status Infusing 1/30/2017  7:48 AM        Opportunity for questions and clarification was provided.       Patient transported with:   [employer]

## 2017-01-30 NOTE — PROGRESS NOTES
TRANSFER - IN REPORT:    Verbal report received from [place], RN(name) on [address]  being received from GI lab(unit) for routine progression of care      Report consisted of patients Situation, Background, Assessment and   Recommendations(SBAR). Information from the following report(s) SBAR was reviewed with the receiving nurse. Opportunity for questions and clarification was provided. Assessment completed upon patients arrival to unit and care assumed.

## 2017-01-30 NOTE — ANESTHESIA PREPROCEDURE EVALUATION
Anesthetic History   No history of anesthetic complications            Review of Systems / Medical History  Patient summary reviewed, nursing notes reviewed and pertinent labs reviewed    Pulmonary  Within defined limits                 Neuro/Psych   Within defined limits           Cardiovascular                  Exercise tolerance: >4 METS  Comments: First degree AV block   GI/Hepatic/Renal               Comments: Chronic ulcerative colitis Endo/Other        Anemia     Other Findings              Physical Exam    Airway  Mallampati: I  TM Distance: > 6 cm  Neck ROM: normal range of motion   Mouth opening: Normal     Cardiovascular  Regular rate and rhythm,  S1 and S2 normal,  no murmur, click, rub, or gallop             Dental  No notable dental hx       Pulmonary  Breath sounds clear to auscultation               Abdominal  GI exam deferred       Other Findings            Anesthetic Plan    ASA: 2  Anesthesia type: total IV anesthesia          Induction: Intravenous  Anesthetic plan and risks discussed with: Patient

## 2017-01-30 NOTE — PROGRESS NOTES
TRANSFER - IN REPORT:    Verbal report received from [place], [place] (name) on Becky Kirk  being received from room 636 (unit) for ordered procedure. Report consisted of patients Situation, Background, Assessment and   Recommendations(SBAR). Information from the following report(s) SBAR was reviewed with the primary nurse. Opportunity for questions and clarification was provided. Awaiting transport to bring pt to the GI Lab at this time.

## 2017-01-30 NOTE — PROGRESS NOTES
Gave tap water enema administered. Lidocaine gel put on rectum and pain medicine administered.  Pt still had a lot discomfort noted

## 2017-01-30 NOTE — ANESTHESIA POSTPROCEDURE EVALUATION
Post-Anesthesia Evaluation and Assessment    Patient: Jazmín Villa MRN: [id number]  SSN: [id number]    YOB: 1970  Age: 40 y.o. Sex: male       Cardiovascular Function/Vital Signs  Visit Vitals    /78    Pulse 74    Temp 37 °C (98.6 °F)    Resp 16    Ht 5' 9\" (1.753 m)    Wt 94 kg (207 lb 3.2 oz)    SpO2 100%    BMI 30.6 kg/m2       Patient is status post total IV anesthesia anesthesia for Procedure(s):  SIGMOIDOSCOPY FLEXIBLE  COLON BIOPSY. Nausea/Vomiting: None    Postoperative hydration reviewed and adequate. Pain:  Pain Scale 1: Numeric (0 - 10) (01/30/17 1436)  Pain Intensity 1: 0 (01/30/17 1436)   Managed    Neurological Status: At baseline    Mental Status and Level of Consciousness: Arousable    Pulmonary Status:   O2 Device: Room air (01/30/17 1436)   Adequate oxygenation and airway patent    Complications related to anesthesia: None    Post-anesthesia assessment completed.  No concerns    Signed By: Thierry De Leon MD     January 30, 2017

## 2017-01-30 NOTE — PROGRESS NOTES
TRANSFER - OUT REPORT:    Verbal report given to Kailash Candelaria rn(name) on Ayesha Aponte  being transferred to [facility](unit) for routine progression of care       Report consisted of patients Situation, Background, Assessment and   Recommendations(SBAR). Information from the following report(s) Procedure Summary was reviewed with the receiving nurse. Lines:   Peripheral IV 01/29/17 Left; Inner Forearm (Active)   Site Assessment Clean, dry, & intact 1/30/2017  7:48 AM   Phlebitis Assessment 0 1/30/2017  7:48 AM   Infiltration Assessment 0 1/30/2017  7:48 AM   Dressing Status Clean, dry, & intact 1/30/2017  7:48 AM   Dressing Type Transparent;Tape 1/30/2017  7:48 AM   Hub Color/Line Status Infusing 1/30/2017  7:48 AM       Peripheral IV 01/30/17 Right Arm (Active)   Site Assessment Clean, dry, & intact 1/30/2017  1:00 PM   Phlebitis Assessment 0 1/30/2017  1:00 PM   Infiltration Assessment 0 1/30/2017  1:00 PM   Dressing Status Clean, dry, & intact 1/30/2017  1:00 PM   Dressing Type Transparent;Tape 1/30/2017  1:00 PM   Hub Color/Line Status Blue; Infusing 1/30/2017  1:00 PM        Opportunity for questions and clarification was provided.       Patient transported with:

## 2017-01-30 NOTE — PROGRESS NOTES
Patient reassessed prior to flexible sigmoidoscopy. His questions were answered. He remains an appropriate candidate for the procedure.

## 2017-01-30 NOTE — PROCEDURES
DATE OF PROCEDURE: 1/30/17    PROCEDURE: Flexible sigmoidoscopy with biopsy    ENDOSCOPIST: Jesse Crowell M.D. PREOPERATIVE DIAGNOSIS: diarrhea    POSTOPERATIVE DIAGNOSIS: colitis    SEDATION: MAC    INSTRUMENT: [id number]    DESCRIPTION OF PROCEDURE:  After informed consent was obtained the patient was placed in the left lateral decubitus position. Intravenous sedation was administered as above. After adequate sedation had been achieved, digital rectal examination was performed. The colonoscope was then inserted through the anus and followed the course of the rectum and colon to 50 cm, where the procedure was terminated due to degree of inflammation. The colonoscope was withdrawn from that point, performing a careful survey of the mucosa. Retroflexion was performed in the rectum. FINDINGS:  Poorly prepped  Colon:  Diffuse stool coating the colon with apparent pseudomembranes. This is washed off to reveal profound confluent ulceration with few remaining pseudopolyps. A decision was made to not attempt insertion beyond 50 cm. Numerous biopsies obtained. Estimated Blood Loss:  0 cc-min    IMPRESSION:  Although apparent pseudomembranes are present, the underlying mucosa appears to be most consistent with severe ulcerative colitis. PLAN: per Dr. Serina Zee. Herlinda Coon MD    Addendum:  The patient has large external hemorrhoids. One of these is dark purple and tender despite sedation, suggesting thrombosis. I will add topical lidocaine.

## 2017-01-31 LAB
ANION GAP BLD CALC-SCNC: 10 MMOL/L (ref 7–16)
BACTERIA SPEC CULT: NORMAL
BACTERIA SPEC CULT: NORMAL
BASOPHILS # BLD AUTO: 0 K/UL (ref 0–0.2)
BASOPHILS # BLD: 0 % (ref 0–2)
BUN SERPL-MCNC: 2 MG/DL (ref 6–23)
CALCIUM SERPL-MCNC: 7.8 MG/DL (ref 8.3–10.4)
CHLORIDE SERPL-SCNC: 103 MMOL/L (ref 98–107)
CO2 SERPL-SCNC: 27 MMOL/L (ref 21–32)
CREAT SERPL-MCNC: 0.56 MG/DL (ref 0.8–1.5)
DIFFERENTIAL METHOD BLD: ABNORMAL
EOSINOPHIL # BLD: 0.1 K/UL (ref 0–0.8)
EOSINOPHIL NFR BLD: 1 % (ref 0.5–7.8)
ERYTHROCYTE [DISTWIDTH] IN BLOOD BY AUTOMATED COUNT: 20.1 % (ref 11.9–14.6)
GLUCOSE SERPL-MCNC: 63 MG/DL (ref 65–100)
HCT VFR BLD AUTO: 26.2 % (ref 41.1–50.3)
HGB BLD-MCNC: 7.7 G/DL (ref 13.6–17.2)
IMM GRANULOCYTES # BLD: 0 K/UL (ref 0–0.5)
IMM GRANULOCYTES NFR BLD AUTO: 0.2 % (ref 0–5)
LYMPHOCYTES # BLD AUTO: 14 % (ref 13–44)
LYMPHOCYTES # BLD: 1.4 K/UL (ref 0.5–4.6)
MCH RBC QN AUTO: 24.4 PG (ref 26.1–32.9)
MCHC RBC AUTO-ENTMCNC: 29.4 G/DL (ref 31.4–35)
MCV RBC AUTO: 82.9 FL (ref 79.6–97.8)
MONOCYTES # BLD: 1.1 K/UL (ref 0.1–1.3)
MONOCYTES NFR BLD AUTO: 11 % (ref 4–12)
NEUTS SEG # BLD: 7.5 K/UL (ref 1.7–8.2)
NEUTS SEG NFR BLD AUTO: 74 % (ref 43–78)
PLATELET # BLD AUTO: 627 K/UL (ref 150–450)
PMV BLD AUTO: 8.7 FL (ref 10.8–14.1)
POTASSIUM SERPL-SCNC: 3.4 MMOL/L (ref 3.5–5.1)
RBC # BLD AUTO: 3.16 M/UL (ref 4.23–5.67)
SERVICE CMNT-IMP: NORMAL
SERVICE CMNT-IMP: NORMAL
SODIUM SERPL-SCNC: 140 MMOL/L (ref 136–145)
WBC # BLD AUTO: 10.1 K/UL (ref 4.3–11.1)

## 2017-01-31 PROCEDURE — 74011000250 HC RX REV CODE- 250: Performed by: PHYSICIAN ASSISTANT

## 2017-01-31 PROCEDURE — 86923 COMPATIBILITY TEST ELECTRIC: CPT | Performed by: INTERNAL MEDICINE

## 2017-01-31 PROCEDURE — 74011250636 HC RX REV CODE- 250/636: Performed by: INTERNAL MEDICINE

## 2017-01-31 PROCEDURE — 74011250637 HC RX REV CODE- 250/637: Performed by: PHYSICIAN ASSISTANT

## 2017-01-31 PROCEDURE — 74011636637 HC RX REV CODE- 636/637: Performed by: INTERNAL MEDICINE

## 2017-01-31 PROCEDURE — 74011250637 HC RX REV CODE- 250/637: Performed by: INTERNAL MEDICINE

## 2017-01-31 PROCEDURE — 65270000029 HC RM PRIVATE

## 2017-01-31 PROCEDURE — 36415 COLL VENOUS BLD VENIPUNCTURE: CPT | Performed by: INTERNAL MEDICINE

## 2017-01-31 PROCEDURE — 77030013131 HC IV BLD ST ICUM -A

## 2017-01-31 PROCEDURE — 74011250637 HC RX REV CODE- 250/637: Performed by: NURSE PRACTITIONER

## 2017-01-31 PROCEDURE — 86900 BLOOD TYPING SEROLOGIC ABO: CPT | Performed by: INTERNAL MEDICINE

## 2017-01-31 PROCEDURE — P9016 RBC LEUKOCYTES REDUCED: HCPCS | Performed by: INTERNAL MEDICINE

## 2017-01-31 PROCEDURE — 85025 COMPLETE CBC W/AUTO DIFF WBC: CPT | Performed by: INTERNAL MEDICINE

## 2017-01-31 PROCEDURE — 80048 BASIC METABOLIC PNL TOTAL CA: CPT | Performed by: INTERNAL MEDICINE

## 2017-01-31 PROCEDURE — 30233N1 TRANSFUSION OF NONAUTOLOGOUS RED BLOOD CELLS INTO PERIPHERAL VEIN, PERCUTANEOUS APPROACH: ICD-10-PCS | Performed by: INTERNAL MEDICINE

## 2017-01-31 PROCEDURE — 36430 TRANSFUSION BLD/BLD COMPNT: CPT

## 2017-01-31 RX ORDER — HYDROCODONE BITARTRATE AND ACETAMINOPHEN 10; 325 MG/1; MG/1
1 TABLET ORAL
Status: DISCONTINUED | OUTPATIENT
Start: 2017-01-31 | End: 2017-02-07 | Stop reason: HOSPADM

## 2017-01-31 RX ORDER — HYDROCORTISONE 25 MG/G
CREAM TOPICAL 4 TIMES DAILY
Status: DISCONTINUED | OUTPATIENT
Start: 2017-01-31 | End: 2017-02-04

## 2017-01-31 RX ORDER — SODIUM CHLORIDE 9 MG/ML
250 INJECTION, SOLUTION INTRAVENOUS AS NEEDED
Status: DISCONTINUED | OUTPATIENT
Start: 2017-01-31 | End: 2017-02-07 | Stop reason: HOSPADM

## 2017-01-31 RX ADMIN — PREDNISONE 40 MG: 20 TABLET ORAL at 07:59

## 2017-01-31 RX ADMIN — Medication 10 ML: at 06:29

## 2017-01-31 RX ADMIN — SODIUM CHLORIDE 250 ML: 900 INJECTION, SOLUTION INTRAVENOUS at 17:12

## 2017-01-31 RX ADMIN — HYDROCORTISONE 2.5%: 25 CREAM TOPICAL at 18:01

## 2017-01-31 RX ADMIN — MESALAMINE 2400 MG: 1.2 TABLET, DELAYED RELEASE ORAL at 18:02

## 2017-01-31 RX ADMIN — HYDROCORTISONE 2.5%: 25 CREAM TOPICAL at 13:04

## 2017-01-31 RX ADMIN — HYDROCODONE BITARTRATE AND ACETAMINOPHEN 1 TABLET: 10; 325 TABLET ORAL at 15:58

## 2017-01-31 RX ADMIN — VANCOMYCIN HYDROCHLORIDE 250 MG: 1 INJECTION, POWDER, LYOPHILIZED, FOR SOLUTION INTRAVENOUS at 13:20

## 2017-01-31 RX ADMIN — HYDROMORPHONE HYDROCHLORIDE 1.5 MG: 1 INJECTION, SOLUTION INTRAMUSCULAR; INTRAVENOUS; SUBCUTANEOUS at 13:15

## 2017-01-31 RX ADMIN — PROMETHAZINE HYDROCHLORIDE 12.5 MG: 25 TABLET ORAL at 07:59

## 2017-01-31 RX ADMIN — MESALAMINE 2400 MG: 1.2 TABLET, DELAYED RELEASE ORAL at 08:00

## 2017-01-31 RX ADMIN — METHYLPREDNISOLONE SODIUM SUCCINATE 40 MG: 40 INJECTION, POWDER, FOR SOLUTION INTRAMUSCULAR; INTRAVENOUS at 13:04

## 2017-01-31 RX ADMIN — HYDROCORTISONE 2.5%: 25 CREAM TOPICAL at 21:59

## 2017-01-31 RX ADMIN — FOLIC ACID 1 MG: 1 TABLET ORAL at 08:00

## 2017-01-31 RX ADMIN — PANTOPRAZOLE SODIUM 40 MG: 40 TABLET, DELAYED RELEASE ORAL at 06:24

## 2017-01-31 RX ADMIN — VANCOMYCIN HYDROCHLORIDE 250 MG: 1 INJECTION, POWDER, LYOPHILIZED, FOR SOLUTION INTRAVENOUS at 17:53

## 2017-01-31 RX ADMIN — HYDROMORPHONE HYDROCHLORIDE 1.5 MG: 1 INJECTION, SOLUTION INTRAMUSCULAR; INTRAVENOUS; SUBCUTANEOUS at 07:58

## 2017-01-31 RX ADMIN — Medication 10 ML: at 13:19

## 2017-01-31 RX ADMIN — Medication 10 ML: at 22:00

## 2017-01-31 RX ADMIN — HYDROMORPHONE HYDROCHLORIDE 1.5 MG: 1 INJECTION, SOLUTION INTRAMUSCULAR; INTRAVENOUS; SUBCUTANEOUS at 04:00

## 2017-01-31 RX ADMIN — METRONIDAZOLE 500 MG: 500 INJECTION, SOLUTION INTRAVENOUS at 06:24

## 2017-01-31 RX ADMIN — METHYLPREDNISOLONE SODIUM SUCCINATE 40 MG: 40 INJECTION, POWDER, FOR SOLUTION INTRAMUSCULAR; INTRAVENOUS at 21:58

## 2017-01-31 RX ADMIN — SODIUM CHLORIDE AND POTASSIUM CHLORIDE: 9; 1.49 INJECTION, SOLUTION INTRAVENOUS at 21:58

## 2017-01-31 RX ADMIN — HYDROCODONE BITARTRATE AND ACETAMINOPHEN 1 TABLET: 7.5; 325 TABLET ORAL at 06:22

## 2017-01-31 RX ADMIN — VANCOMYCIN HYDROCHLORIDE 250 MG: 1 INJECTION, POWDER, LYOPHILIZED, FOR SOLUTION INTRAVENOUS at 06:23

## 2017-01-31 RX ADMIN — HYDROMORPHONE HYDROCHLORIDE 1.5 MG: 1 INJECTION, SOLUTION INTRAMUSCULAR; INTRAVENOUS; SUBCUTANEOUS at 17:52

## 2017-01-31 NOTE — PROGRESS NOTES
Call to blood bank spoke with French Victor, specimen received in lab, will call when blood is ready.

## 2017-01-31 NOTE — PROGRESS NOTES
Pt continues with pain requiring IV dilaudid 1.5. Severe sharp lower abdominal pain 10/10. Needed Norco for breakthrough pain this am.  No emesis.

## 2017-01-31 NOTE — PROGRESS NOTES
GI DAILY PROGRESS NOTE    Admit Date:  1/18/2017    Today's Date:  1/31/2017    CC:  UC, c difficile    Subjective:     Patient S/P flex sig yesterday with apparent pseudomembranes present, however the underlying mucosa appeared to be most consistent with severe ulcerative colitis. He reports worsening of pain today, diffusely but worse in the lower abd and rectum. Significant other at bedside; expresses frustration at his lack of improvement, lack of \"answers\" as to why he is not getting better. Appetite poor. Does not feel pain medication is lasting long enough.     Medications:   Current Facility-Administered Medications   Medication Dose Route Frequency    lactated ringers infusion  100 mL/hr IntraVENous CONTINUOUS    lidocaine (XYLOCAINE) 5 % ointment   Topical PRN    HYDROmorphone (PF) (DILAUDID) injection 1.5 mg  1.5 mg IntraVENous Q4H PRN    lidocaine (XYLOCAINE) 2 % jelly   Other QID PRN    vancomycin 50 mg/mL oral solution (compounded) 250 mg  250 mg Oral Q6H    metroNIDAZOLE (FLAGYL) IVPB premix 500 mg  500 mg IntraVENous Q8H    hydrocortisone (ANUSOL-HC) suppository 25 mg  25 mg Rectal Q12H PRN    HYDROcodone-acetaminophen (NORCO) 7.5-325 mg per tablet 1 Tab  1 Tab Oral Q4H PRN    pantoprazole (PROTONIX) tablet 40 mg  40 mg Oral ACB    predniSONE (DELTASONE) tablet 40 mg  40 mg Oral DAILY WITH BREAKFAST    dicyclomine (BENTYL) capsule 10 mg  10 mg Oral QID PRN    calcium carbonate (TUMS) chewable tablet 200 mg [elemental]  200 mg Oral TID PRN    alum-mag hydroxide-simeth (MYLANTA) oral suspension 30 mL  30 mL Oral Q4H PRN    0.9% sodium chloride infusion 250 mL  250 mL IntraVENous PRN    mesalamine (LIALDA) delayed release tablet 2,400 mg- PATIENT SUPPLIED  2.4 g Oral BID WITH MEALS    folic acid (FOLVITE) tablet 1 mg  1 mg Oral DAILY    promethazine (PHENERGAN) tablet 12.5 mg  12.5 mg Oral Q6H PRN    sodium chloride (NS) flush 5-10 mL  5-10 mL IntraVENous Q8H    sodium chloride (NS) flush 5-10 mL  5-10 mL IntraVENous PRN    ondansetron (ZOFRAN) injection 4 mg  4 mg IntraVENous Q4H PRN    zolpidem (AMBIEN) tablet 5 mg  5 mg Oral QHS PRN    0.9% sodium chloride with KCl 20 mEq/L infusion   IntraVENous CONTINUOUS    acetaminophen (TYLENOL) tablet 650 mg  650 mg Oral Q4H PRN       Review of Systems:  ROS was obtained, with pertinent positives as listed above. No chest pain or SOB. Diet:  clears    Objective:   Vitals:  Visit Vitals    /69 (BP 1 Location: Right arm, BP Patient Position: At rest)    Pulse 85    Temp 98.4 °F (36.9 °C)    Resp 17    Ht 5' 9\" (1.753 m)    Wt 94 kg (207 lb 3.2 oz)    SpO2 95%    BMI 30.6 kg/m2     Intake/Output:     01/29 1901 - 01/31 0700  In: 1592 [I.V.:3694]  Out: 0   Exam:  General appearance: alert, cooperative, no distress  Skin : pale  Lungs: clear to auscultation bilaterally anteriorly  Heart: regular rate and rhythm  Abdomen: soft, diffusely tender, worse in the lower abd.  Bowel sounds normal. No masses, no organomegaly  Extremities: extremities normal, atraumatic, no cyanosis or edema  Neuro:  alert and oriented    Data Review (Labs):    Recent Labs      01/31/17   0558  01/30/17   0719  01/29/17   1153  01/29/17   0736  01/28/17   1850   WBC  10.1  11.5*   --    --    --    HGB  7.7*  7.5*   --   8.0*  8.3*   HCT  26.2*  25.6*   --   27.4*  28.2*   PLT  627*  539*   --    --    --    MCV  82.9  83.4   --    --    --    NA  140  140  143   --    --    K  3.4*  3.8  4.1   --    --    CL  103  106  108*   --    --    CO2  27  24  27   --    --    BUN  2*  3*  4*   --    --    CREA  0.56*  0.56*  0.66*   --    --    CA  7.8*  8.1*  7.8*   --    --    GLU  63*  76  90   --    --        Assessment:     Principal Problem:    Ulcerative colitis ([facility] [address] (1/18/2017)    Active Problems:    Rectal bleed (12/1/2016)      Abdominal pain (12/1/2016)      Fever (1/12/2017)      Acute blood loss anemia (1/18/2017)      Clostridium difficile diarrhea (1/18/2017)        Plan:     39 yo male is seen with UC and c difficile. Readmitted 1/18/17, now S/P flex sig yesterday with apparent pseudomembranes present, however the underlying mucosa appeared to be most consistent with severe ulcerative colitis. Stools positive for C diff 1/12/17. He was started on Remicade therapy during a previous hospitalization on 12/9/16, and received the second dose on 12/22/16. He was due for 3rd dose on 1/19/17, held due to [address] [place] [address]. He is has been on Flagyl with Vancomycin added 1/19/17. Remains on oral prednisone 40mg daily. Hgb low but stable. 1.  Discuss pain control with Dr Yoko Liz; has Norco and Dilaudid 1.5 mg q 4 hr ordered  2. Remicade on hold  3. May need Difficid vs fecal transplant if not improving       Patient is seen and examined in collaboration with Dr. Yoko Liz. Assessment and plan as per Dr. Yoko Liz. I have seen and examined this patient, and agree with above assessment and plan. Cont oral vanco, MARY flagyl - CDiff improved  WBC nl, afebrile  Severe pain from large hemorrhoids- add anusol cr  Change prednisone to solumedrol  If no improvement, may need inpt remicade.   Ideally wait until C diff Rx complete  We discussed possible transfer to tertiary center- pt / wife prefer to stay here for now  Explained that fecal Rx will not help underlying UC- this appeared to be the primary problem on most recent Ramya Barclay MD

## 2017-01-31 NOTE — PROGRESS NOTES
Problem: Nutrition Deficit  Goal: *Optimize nutritional status  Nutrition F/U  Assessment:   · Diet order(s): 1-24: GI soft Ensure clear tid, 1-25: Clear liquid, 1-26: NPO then lactose free, 1-29: Vegetarian, then clear liquid, 1-30: NPO then clear liquid  · Pt says he tasted the Ensure clear one time but it made his stomach sick. Pt has colonoscopy on 1-26 and flex sig on 1-30 with finding c/w severe ulceratice colitis, some pseudomembranes present. · BUN 2 potentially c/w decline in protein status  · Revised Macronutrient needs:              EER: 3699-5208 kcal /day (20-25 kcal/kg actual BW)              EPR:  grams protein/day (1.2-1.5 grams/kg IBW)  Intake/Comparative Standards: Current clear liquid diet does not meet kcal or protein needs. Average intake for past 7 day(s)/2 recorded meal(s): 0%. Nutrition Diagnosis: Inadequate protein-energy iintake r/t inability to consume sufficient oral intake as evidenced by diet limited to nutritionally inadequate clear liquid diet or NPO status for procedures and now d/t severe UC, intake as above     Intervention:  Meals and snacks:  Await  progression of p.o. diet as GI status allows. Nutrition supplement therapy: Discontinue Ensure clear tid d/t pt association with symptoms. PN: Would peripheral TPN or insertion of central line for for TPN be a consideration? If TPN is pursued, please order nutrition consult for TPN management.      [facility], [address], [facility] [place] [address]

## 2017-02-01 LAB
ABO + RH BLD: NORMAL
ANION GAP BLD CALC-SCNC: 11 MMOL/L (ref 7–16)
BASOPHILS # BLD AUTO: 0 K/UL (ref 0–0.2)
BASOPHILS # BLD: 0 % (ref 0–2)
BLD PROD TYP BPU: NORMAL
BLOOD GROUP ANTIBODIES SERPL: NORMAL
BPU ID: NORMAL
BUN SERPL-MCNC: 5 MG/DL (ref 6–23)
CALCIUM SERPL-MCNC: 8.4 MG/DL (ref 8.3–10.4)
CHLORIDE SERPL-SCNC: 106 MMOL/L (ref 98–107)
CO2 SERPL-SCNC: 26 MMOL/L (ref 21–32)
CREAT SERPL-MCNC: 0.51 MG/DL (ref 0.8–1.5)
CROSSMATCH RESULT,%XM: NORMAL
DIFFERENTIAL METHOD BLD: ABNORMAL
EOSINOPHIL # BLD: 0 K/UL (ref 0–0.8)
EOSINOPHIL NFR BLD: 0 % (ref 0.5–7.8)
ERYTHROCYTE [DISTWIDTH] IN BLOOD BY AUTOMATED COUNT: 19.3 % (ref 11.9–14.6)
GLUCOSE SERPL-MCNC: 104 MG/DL (ref 65–100)
HCT VFR BLD AUTO: 28.8 % (ref 41.1–50.3)
HGB BLD-MCNC: 8.7 G/DL (ref 13.6–17.2)
IMM GRANULOCYTES # BLD: 0 K/UL (ref 0–0.5)
IMM GRANULOCYTES NFR BLD AUTO: 0.2 % (ref 0–5)
LYMPHOCYTES # BLD AUTO: 6 % (ref 13–44)
LYMPHOCYTES # BLD: 0.5 K/UL (ref 0.5–4.6)
MCH RBC QN AUTO: 25 PG (ref 26.1–32.9)
MCHC RBC AUTO-ENTMCNC: 30.2 G/DL (ref 31.4–35)
MCV RBC AUTO: 82.8 FL (ref 79.6–97.8)
MONOCYTES # BLD: 0.5 K/UL (ref 0.1–1.3)
MONOCYTES NFR BLD AUTO: 6 % (ref 4–12)
NEUTS SEG # BLD: 8.1 K/UL (ref 1.7–8.2)
NEUTS SEG NFR BLD AUTO: 88 % (ref 43–78)
PLATELET # BLD AUTO: 531 K/UL (ref 150–450)
PMV BLD AUTO: 8.9 FL (ref 10.8–14.1)
POTASSIUM SERPL-SCNC: 4.1 MMOL/L (ref 3.5–5.1)
RBC # BLD AUTO: 3.48 M/UL (ref 4.23–5.67)
SODIUM SERPL-SCNC: 143 MMOL/L (ref 136–145)
SPECIMEN EXP DATE BLD: NORMAL
STATUS OF UNIT,%ST: NORMAL
UNIT DIVISION, %UDIV: 0
WBC # BLD AUTO: 9.1 K/UL (ref 4.3–11.1)

## 2017-02-01 PROCEDURE — 85025 COMPLETE CBC W/AUTO DIFF WBC: CPT | Performed by: INTERNAL MEDICINE

## 2017-02-01 PROCEDURE — 74011250637 HC RX REV CODE- 250/637: Performed by: INTERNAL MEDICINE

## 2017-02-01 PROCEDURE — 74011250636 HC RX REV CODE- 250/636: Performed by: INTERNAL MEDICINE

## 2017-02-01 PROCEDURE — 74011250637 HC RX REV CODE- 250/637: Performed by: PHYSICIAN ASSISTANT

## 2017-02-01 PROCEDURE — 74011250637 HC RX REV CODE- 250/637: Performed by: NURSE PRACTITIONER

## 2017-02-01 PROCEDURE — 65270000029 HC RM PRIVATE

## 2017-02-01 PROCEDURE — 80048 BASIC METABOLIC PNL TOTAL CA: CPT | Performed by: INTERNAL MEDICINE

## 2017-02-01 PROCEDURE — 36415 COLL VENOUS BLD VENIPUNCTURE: CPT | Performed by: INTERNAL MEDICINE

## 2017-02-01 RX ADMIN — HYDROCORTISONE 2.5%: 25 CREAM TOPICAL at 08:04

## 2017-02-01 RX ADMIN — HYDROCODONE BITARTRATE AND ACETAMINOPHEN 1 TABLET: 10; 325 TABLET ORAL at 17:13

## 2017-02-01 RX ADMIN — VANCOMYCIN HYDROCHLORIDE 250 MG: 1 INJECTION, POWDER, LYOPHILIZED, FOR SOLUTION INTRAVENOUS at 05:48

## 2017-02-01 RX ADMIN — FOLIC ACID 1 MG: 1 TABLET ORAL at 08:00

## 2017-02-01 RX ADMIN — HYDROMORPHONE HYDROCHLORIDE 1.5 MG: 1 INJECTION, SOLUTION INTRAMUSCULAR; INTRAVENOUS; SUBCUTANEOUS at 20:24

## 2017-02-01 RX ADMIN — METHYLPREDNISOLONE SODIUM SUCCINATE 40 MG: 40 INJECTION, POWDER, FOR SOLUTION INTRAMUSCULAR; INTRAVENOUS at 20:24

## 2017-02-01 RX ADMIN — PANTOPRAZOLE SODIUM 40 MG: 40 TABLET, DELAYED RELEASE ORAL at 07:30

## 2017-02-01 RX ADMIN — VANCOMYCIN HYDROCHLORIDE 250 MG: 1 INJECTION, POWDER, LYOPHILIZED, FOR SOLUTION INTRAVENOUS at 17:10

## 2017-02-01 RX ADMIN — HYDROMORPHONE HYDROCHLORIDE 1.5 MG: 1 INJECTION, SOLUTION INTRAMUSCULAR; INTRAVENOUS; SUBCUTANEOUS at 08:18

## 2017-02-01 RX ADMIN — HYDROMORPHONE HYDROCHLORIDE 1.5 MG: 1 INJECTION, SOLUTION INTRAMUSCULAR; INTRAVENOUS; SUBCUTANEOUS at 00:18

## 2017-02-01 RX ADMIN — SODIUM CHLORIDE AND POTASSIUM CHLORIDE: 9; 1.49 INJECTION, SOLUTION INTRAVENOUS at 07:28

## 2017-02-01 RX ADMIN — MESALAMINE 2400 MG: 1.2 TABLET, DELAYED RELEASE ORAL at 17:10

## 2017-02-01 RX ADMIN — HYDROCORTISONE 2.5%: 25 CREAM TOPICAL at 17:17

## 2017-02-01 RX ADMIN — METHYLPREDNISOLONE SODIUM SUCCINATE 40 MG: 40 INJECTION, POWDER, FOR SOLUTION INTRAMUSCULAR; INTRAVENOUS at 08:00

## 2017-02-01 RX ADMIN — HYDROCORTISONE 2.5%: 25 CREAM TOPICAL at 12:53

## 2017-02-01 RX ADMIN — MESALAMINE 2400 MG: 1.2 TABLET, DELAYED RELEASE ORAL at 08:01

## 2017-02-01 RX ADMIN — SODIUM CHLORIDE AND POTASSIUM CHLORIDE: 9; 1.49 INJECTION, SOLUTION INTRAVENOUS at 17:09

## 2017-02-01 RX ADMIN — Medication 10 ML: at 22:00

## 2017-02-01 RX ADMIN — PROMETHAZINE HYDROCHLORIDE 12.5 MG: 25 TABLET ORAL at 08:20

## 2017-02-01 RX ADMIN — Medication 10 ML: at 12:50

## 2017-02-01 RX ADMIN — VANCOMYCIN HYDROCHLORIDE 250 MG: 1 INJECTION, POWDER, LYOPHILIZED, FOR SOLUTION INTRAVENOUS at 12:48

## 2017-02-01 RX ADMIN — Medication 10 ML: at 05:51

## 2017-02-01 RX ADMIN — ZOLPIDEM TARTRATE 5 MG: 5 TABLET, FILM COATED ORAL at 00:19

## 2017-02-01 RX ADMIN — VANCOMYCIN HYDROCHLORIDE 250 MG: 1 INJECTION, POWDER, LYOPHILIZED, FOR SOLUTION INTRAVENOUS at 00:18

## 2017-02-01 RX ADMIN — HYDROMORPHONE HYDROCHLORIDE 1.5 MG: 1 INJECTION, SOLUTION INTRAMUSCULAR; INTRAVENOUS; SUBCUTANEOUS at 12:48

## 2017-02-01 NOTE — PROGRESS NOTES
GI DAILY PROGRESS NOTE    Admit Date:  1/18/2017    Today's Date:  2/1/2017    CC:  UC, c difficile    Subjective:     Patient S/P flex sig 1/30/17. Reports he always feels worse he day after the colon exam.  Feeling much improved today with less abdominal pain. Cream has helped hemorrhoids; still having spots of blood with stools. Tolerating clears and wants to try full liquids. Has questions about potential Remicade treatment inpatient.     Medications:   Current Facility-Administered Medications   Medication Dose Route Frequency    methylPREDNISolone (PF) (SOLU-MEDROL) injection 40 mg  40 mg IntraVENous Q12H    hydrocortisone (ANUSOL-HC) 2.5 % rectal cream   PeriANAL QID    HYDROcodone-acetaminophen (NORCO)  mg tablet 1 Tab  1 Tab Oral Q6H PRN    0.9% sodium chloride infusion 250 mL  250 mL IntraVENous PRN    lactated ringers infusion  100 mL/hr IntraVENous CONTINUOUS    lidocaine (XYLOCAINE) 5 % ointment   Topical PRN    HYDROmorphone (PF) (DILAUDID) injection 1.5 mg  1.5 mg IntraVENous Q4H PRN    lidocaine (XYLOCAINE) 2 % jelly   Other QID PRN    vancomycin 50 mg/mL oral solution (compounded) 250 mg  250 mg Oral Q6H    hydrocortisone (ANUSOL-HC) suppository 25 mg  25 mg Rectal Q12H PRN    pantoprazole (PROTONIX) tablet 40 mg  40 mg Oral ACB    dicyclomine (BENTYL) capsule 10 mg  10 mg Oral QID PRN    calcium carbonate (TUMS) chewable tablet 200 mg [elemental]  200 mg Oral TID PRN    alum-mag hydroxide-simeth (MYLANTA) oral suspension 30 mL  30 mL Oral Q4H PRN    mesalamine (LIALDA) delayed release tablet 2,400 mg- PATIENT SUPPLIED  2.4 g Oral BID WITH MEALS    folic acid (FOLVITE) tablet 1 mg  1 mg Oral DAILY    promethazine (PHENERGAN) tablet 12.5 mg  12.5 mg Oral Q6H PRN    sodium chloride (NS) flush 5-10 mL  5-10 mL IntraVENous Q8H    sodium chloride (NS) flush 5-10 mL  5-10 mL IntraVENous PRN    ondansetron (ZOFRAN) injection 4 mg  4 mg IntraVENous Q4H PRN    zolpidem (AMBIEN) tablet 5 mg  5 mg Oral QHS PRN    0.9% sodium chloride with KCl 20 mEq/L infusion   IntraVENous CONTINUOUS       Review of Systems:  ROS was obtained, with pertinent positives as listed above. No chest pain or SOB. Diet:  clears    Objective:   Vitals:  Visit Vitals    /59 (BP 1 Location: Right arm)    Pulse 88    Temp 97.6 °F (36.4 °C)    Resp 18    Ht 5' 9\" (1.753 m)    Wt 94 kg (207 lb 3.2 oz)    SpO2 94%    BMI 30.6 kg/m2     Intake/Output:     01/30 1901 - 02/01 0700  In: 0763 [P.O.:240; I.V.:4590]  Out: -   Exam:  General appearance: alert, cooperative, no distress  Skin : pale  Lungs: clear to auscultation bilaterally anteriorly  Heart: regular rate and rhythm  Abdomen: soft, diffusely tender, worse in the lower abd. Bowel sounds normal. No masses, no organomegaly  Extremities: extremities normal, atraumatic, no cyanosis or edema  Neuro:  alert and oriented    Data Review (Labs):    Recent Labs      02/01/17   0631  01/31/17   0558  01/30/17   0719  01/29/17   1153   WBC  9.1  10.1  11.5*   --    HGB  8.7*  7.7*  7.5*   --    HCT  28.8*  26.2*  25.6*   --    PLT  531*  627*  539*   --    MCV  82.8  82.9  83.4   --    NA  143  140  140  143   K  4.1  3.4*  3.8  4.1   CL  106  103  106  108*   CO2  26  27  24  27   BUN  5*  2*  3*  4*   CREA  0.51*  0.56*  0.56*  0.66*   CA  8.4  7.8*  8.1*  7.8*   GLU  104*  63*  76  90       Assessment:     Principal Problem:    Ulcerative colitis (HCC) (1/18/2017)    Active Problems:    Rectal bleed (12/1/2016)      Abdominal pain (12/1/2016)      Fever (1/12/2017)      Acute blood loss anemia (1/18/2017)      Clostridium difficile diarrhea (1/18/2017)        Plan:     39 yo male is seen with UC and c difficile. Readmitted 1/18/17, now S/P flex sig 1/30/17 with apparent pseudomembranes present, however the underlying mucosa appeared to be most consistent with severe ulcerative colitis. Stools positive for C diff 1/12/17.  He was started on Remicade therapy during a previous hospitalization on 12/9/16, and received the second dose on 12/22/16. He was due for 3rd dose on 1/19/17, [place] due [place] [facility] [place] He is has been on Flagyl with Vancomycin added 1/19/17. Remains on oral prednisone 40mg daily. Hgb low but stable. 1.  Day # 14 Vancomycin; Flagyl dc'd yesterday  2. Transfused 1 unit PRBC yesterday with appropriate rise in hgb  3. Remicade dose was due  1/19/17; may consider giving inpatient   4. Advance to full liquids    Patient is seen and examined in collaboration with Dr. Jerzy Galindo. Assessment and plan as per Dr. Jerzy Galindo. Sammi Trinidad NP    I have seen and examined this patient, and agree with above assessment and plan. Feeling much better today p IV steroids.    Cont solumedrol, asacol  Finish another week of vanco given slow response  Advance diet as tolerated    Camacho Pandya MD

## 2017-02-02 PROCEDURE — 74011250637 HC RX REV CODE- 250/637: Performed by: NURSE PRACTITIONER

## 2017-02-02 PROCEDURE — 74011250637 HC RX REV CODE- 250/637: Performed by: INTERNAL MEDICINE

## 2017-02-02 PROCEDURE — 74011250636 HC RX REV CODE- 250/636: Performed by: INTERNAL MEDICINE

## 2017-02-02 PROCEDURE — 65270000029 HC RM PRIVATE

## 2017-02-02 PROCEDURE — 74011250637 HC RX REV CODE- 250/637: Performed by: PHYSICIAN ASSISTANT

## 2017-02-02 RX ORDER — PREDNISONE 20 MG/1
40 TABLET ORAL
Status: DISCONTINUED | OUTPATIENT
Start: 2017-02-03 | End: 2017-02-07 | Stop reason: HOSPADM

## 2017-02-02 RX ADMIN — VANCOMYCIN HYDROCHLORIDE 250 MG: 1 INJECTION, POWDER, LYOPHILIZED, FOR SOLUTION INTRAVENOUS at 23:24

## 2017-02-02 RX ADMIN — HYDROMORPHONE HYDROCHLORIDE 1.5 MG: 1 INJECTION, SOLUTION INTRAMUSCULAR; INTRAVENOUS; SUBCUTANEOUS at 13:39

## 2017-02-02 RX ADMIN — HYDROCORTISONE 2.5%: 25 CREAM TOPICAL at 00:38

## 2017-02-02 RX ADMIN — Medication 10 ML: at 14:00

## 2017-02-02 RX ADMIN — MESALAMINE 2400 MG: 1.2 TABLET, DELAYED RELEASE ORAL at 09:15

## 2017-02-02 RX ADMIN — HYDROCORTISONE 2.5%: 25 CREAM TOPICAL at 13:00

## 2017-02-02 RX ADMIN — Medication 5 ML: at 19:48

## 2017-02-02 RX ADMIN — HYDROMORPHONE HYDROCHLORIDE 1.5 MG: 1 INJECTION, SOLUTION INTRAMUSCULAR; INTRAVENOUS; SUBCUTANEOUS at 23:25

## 2017-02-02 RX ADMIN — VANCOMYCIN HYDROCHLORIDE 250 MG: 1 INJECTION, POWDER, LYOPHILIZED, FOR SOLUTION INTRAVENOUS at 06:06

## 2017-02-02 RX ADMIN — HYDROMORPHONE HYDROCHLORIDE 1.5 MG: 1 INJECTION, SOLUTION INTRAMUSCULAR; INTRAVENOUS; SUBCUTANEOUS at 06:12

## 2017-02-02 RX ADMIN — HYDROMORPHONE HYDROCHLORIDE 1.5 MG: 1 INJECTION, SOLUTION INTRAMUSCULAR; INTRAVENOUS; SUBCUTANEOUS at 17:58

## 2017-02-02 RX ADMIN — VANCOMYCIN HYDROCHLORIDE 250 MG: 1 INJECTION, POWDER, LYOPHILIZED, FOR SOLUTION INTRAVENOUS at 12:34

## 2017-02-02 RX ADMIN — ZOLPIDEM TARTRATE 5 MG: 5 TABLET, FILM COATED ORAL at 00:35

## 2017-02-02 RX ADMIN — HYDROCORTISONE 2.5%: 25 CREAM TOPICAL at 19:49

## 2017-02-02 RX ADMIN — SODIUM CHLORIDE AND POTASSIUM CHLORIDE: 9; 1.49 INJECTION, SOLUTION INTRAVENOUS at 23:24

## 2017-02-02 RX ADMIN — ZOLPIDEM TARTRATE 5 MG: 5 TABLET, FILM COATED ORAL at 23:24

## 2017-02-02 RX ADMIN — METHYLPREDNISOLONE SODIUM SUCCINATE 40 MG: 40 INJECTION, POWDER, FOR SOLUTION INTRAMUSCULAR; INTRAVENOUS at 19:48

## 2017-02-02 RX ADMIN — VANCOMYCIN HYDROCHLORIDE 250 MG: 1 INJECTION, POWDER, LYOPHILIZED, FOR SOLUTION INTRAVENOUS at 00:38

## 2017-02-02 RX ADMIN — VANCOMYCIN HYDROCHLORIDE 250 MG: 1 INJECTION, POWDER, LYOPHILIZED, FOR SOLUTION INTRAVENOUS at 17:10

## 2017-02-02 RX ADMIN — HYDROMORPHONE HYDROCHLORIDE 1.5 MG: 1 INJECTION, SOLUTION INTRAMUSCULAR; INTRAVENOUS; SUBCUTANEOUS at 00:34

## 2017-02-02 RX ADMIN — MESALAMINE 2400 MG: 1.2 TABLET, DELAYED RELEASE ORAL at 17:09

## 2017-02-02 RX ADMIN — SODIUM CHLORIDE AND POTASSIUM CHLORIDE: 9; 1.49 INJECTION, SOLUTION INTRAVENOUS at 13:00

## 2017-02-02 RX ADMIN — PANTOPRAZOLE SODIUM 40 MG: 40 TABLET, DELAYED RELEASE ORAL at 05:38

## 2017-02-02 RX ADMIN — FOLIC ACID 1 MG: 1 TABLET ORAL at 09:15

## 2017-02-02 RX ADMIN — METHYLPREDNISOLONE SODIUM SUCCINATE 40 MG: 40 INJECTION, POWDER, FOR SOLUTION INTRAMUSCULAR; INTRAVENOUS at 09:15

## 2017-02-02 RX ADMIN — HYDROCORTISONE 2.5%: 25 CREAM TOPICAL at 18:00

## 2017-02-02 RX ADMIN — HYDROCORTISONE 2.5%: 25 CREAM TOPICAL at 09:00

## 2017-02-02 RX ADMIN — SODIUM CHLORIDE AND POTASSIUM CHLORIDE: 9; 1.49 INJECTION, SOLUTION INTRAVENOUS at 02:56

## 2017-02-02 NOTE — ACP (ADVANCE CARE PLANNING)
Power of [employer] for [employer] received request to offer assistance with [address]. Spoke with nurse to ensure that patient was sufficiently alert and oriented to proceed with consult. Reviewed information with patient. Patient stated that he desired to review the document with his wife and declined completing an Advance Medical Directive. Mr. Jojo Norwood says he will notify his nurse when he desires  to return. Rev.  Leopoldo Sharma MDiv, BS  Board Certified

## 2017-02-02 NOTE — PROGRESS NOTES
Power of [employer] for [employer] received request to offer assistance with [address]. Spoke with nurse to ensure that patient was sufficiently alert and oriented to proceed with consult. Reviewed information with patient. Patient stated that he desired to review the document with his wife and declined completing an Advance Medical Directive. Mr. Jayne Hatchet says he will notify his nurse when he desires  to return. Rev.  Josefina Pak MDiv, BS  Board Certified

## 2017-02-02 NOTE — PROGRESS NOTES
Problem: Nutrition Deficit  Goal: *Optimize nutritional status  Nutrition F/U  Assessment:   · Diet order(s): 1-24: GI soft Ensure clear tid, 1-25: Clear liquid, 1-26: NPO then lactose free, 1-29: Vegetarian, then clear liquid, 1-30: NPO then clear liquid, 2-2: GI soft  · Pt reports that his abdominal pain has subsided so he asked for his diet to be upgraded. Pt received GI soft tray at breakfast this morning and declines any pain or cramping at this time. · BUN 5- trending up but low BUN is potentially c/w decline in protein status. Pt states that he is a vegetarian. Discussed how most plant based protein sources are higher in fiber and how pt needs to avoid excessive amounts of fiber due to his severe ulcerative colitis. Pt states he thought that it was harder to digest meat so that was the main reason for avoiding meat in the past. Pt states that he would like to start trying small amounts of meat at his meals. · Pt has been provided with numerous educational handouts on diets and ulcerative colitis. Reinforced need to follow these diet recommendations to prevent further flare ups. Revised Macronutrient needs:  EER: 4059-9080 kcal /day (20-25 kcal/kg actual BW)  EPR:  grams protein/day (1.2-1.5 grams/kg IBW)  Intake/Comparative Standards: Current po intakes have averaged 0% consumption. This does not meet estimated kcal or protein needs.        Nutrition Diagnosis: Inadequate protein-energy intake related to inability to consume sufficient oral intake as evidenced by diet limited to nutritionally inadequate clear liquid diet or NPO status for procedures and now d/t severe UC, intake as above      Intervention:  Meals and snacks: continue to advance diet as medically appropriate, add low fiber restriction to diet  Nutrition supplement therapy: None at this time due to pt complaining that previous supplements made him \"sick\"  PN: If pt is unable to tolerate GI soft-low fiber diet consider TPN for bowel rest. If TPN is warranted please consult Dietitian. Arminda Manzano [address], [address], [phone]

## 2017-02-02 NOTE — PROGRESS NOTES
GI DAILY PROGRESS NOTE    Admit Date:  1/18/2017    Today's Date:  2/2/2017    CC:  UC, c difficile    Subjective:     Patient continues to feel improved. No abdominal pain today but still with significant rectal pain. Still having loose stools but lessened frequency. He is tolerating gi soft diet. Medications:   Current Facility-Administered Medications   Medication Dose Route Frequency    methylPREDNISolone (PF) (SOLU-MEDROL) injection 40 mg  40 mg IntraVENous Q12H    hydrocortisone (ANUSOL-HC) 2.5 % rectal cream   PeriANAL QID    HYDROcodone-acetaminophen (NORCO)  mg tablet 1 Tab  1 Tab Oral Q6H PRN    0.9% sodium chloride infusion 250 mL  250 mL IntraVENous PRN    lidocaine (XYLOCAINE) 5 % ointment   Topical PRN    HYDROmorphone (PF) (DILAUDID) injection 1.5 mg  1.5 mg IntraVENous Q4H PRN    lidocaine (XYLOCAINE) 2 % jelly   Other QID PRN    vancomycin 50 mg/mL oral solution (compounded) 250 mg  250 mg Oral Q6H    hydrocortisone (ANUSOL-HC) suppository 25 mg  25 mg Rectal Q12H PRN    pantoprazole (PROTONIX) tablet 40 mg  40 mg Oral ACB    dicyclomine (BENTYL) capsule 10 mg  10 mg Oral QID PRN    calcium carbonate (TUMS) chewable tablet 200 mg [elemental]  200 mg Oral TID PRN    alum-mag hydroxide-simeth (MYLANTA) oral suspension 30 mL  30 mL Oral Q4H PRN    mesalamine (LIALDA) delayed release tablet 2,400 mg- PATIENT SUPPLIED  2.4 g Oral BID WITH MEALS    folic acid (FOLVITE) tablet 1 mg  1 mg Oral DAILY    promethazine (PHENERGAN) tablet 12.5 mg  12.5 mg Oral Q6H PRN    sodium chloride (NS) flush 5-10 mL  5-10 mL IntraVENous Q8H    sodium chloride (NS) flush 5-10 mL  5-10 mL IntraVENous PRN    ondansetron (ZOFRAN) injection 4 mg  4 mg IntraVENous Q4H PRN    zolpidem (AMBIEN) tablet 5 mg  5 mg Oral QHS PRN    0.9% sodium chloride with KCl 20 mEq/L infusion   IntraVENous CONTINUOUS       Review of Systems:  ROS was obtained, with pertinent positives as listed above.   No chest pain or SOB. Diet:  clears    Objective:   Vitals:  Visit Vitals    /89    Pulse 71    Temp 97.8 °F (36.6 °C)    Resp 18    Ht 5' 9\" (1.753 m)    Wt 94 kg (207 lb 3.2 oz)    SpO2 94%    BMI 30.6 kg/m2     Intake/Output:  02/02 0701 - 02/02 1900  In: 240 [P.O.:240]  Out: -   01/31 1901 - 02/02 0700  In: 4073 [P.O.:480; I.V.:2310]  Out: -   Exam:  General appearance: alert, cooperative, no distress  Skin : pale  Lungs: clear to auscultation bilaterally anteriorly  Heart: regular rate and rhythm  Abdomen: soft, non tender to exam.  Bowel sounds normal. No masses, no organomegaly  Extremities: extremities normal, atraumatic, no cyanosis or edema  Neuro:  alert and oriented    Data Review (Labs):    Recent Labs      02/01/17   0631  01/31/17   0558   WBC  9.1  10.1   HGB  8.7*  7.7*   HCT  28.8*  26.2*   PLT  531*  627*   MCV  82.8  82.9   NA  143  140   K  4.1  3.4*   CL  106  103   CO2  26  27   BUN  5*  2*   CREA  0.51*  0.56*   CA  8.4  7.8*   GLU  104*  63*       Assessment:     Principal Problem:    Ulcerative colitis ([facility] [address] (1/18/2017)    Active Problems:    Rectal bleed (12/1/2016)      Abdominal pain (12/1/2016)      Fever (1/12/2017)      Acute blood loss anemia (1/18/2017)      Clostridium difficile diarrhea (1/18/2017)        Plan:     41 yo male is seen with UC and c difficile. Readmitted 1/18/17, now S/P flex sig 1/30/17 with apparent pseudomembranes present, however the underlying mucosa appeared to be most consistent with severe ulcerative colitis. Stools positive for C diff 1/12/17. He was started on Remicade therapy during a previous hospitalization on 12/9/16, and received the second dose on 12/22/16. He was due for 3rd dose on 1/19/17, held due to [address] [place] [address]. He is has been on Flagyl with Vancomycin added 1/19/17. Changed from oral to IV steroids 1/31/17, now with SoluMedrol 40 mg IV q12 hr.  Hgb low but stable following 1 unit PRBC transfusion earlier this week.     1.  Day # 15 Vancomycin; Flagyl dc'd 1/31/17. Continue Vancomycin for 21 days  2. Third Remicade dose was due 1/19/17; may consider giving inpatient pending clinical status  3. Nutritionist following for dietary recommendations    Patient is seen and examined in collaboration with Dr. Radha Antoine. Assessment and plan as per Dr. Radha Antoine. Luana Nevarez NP    I have seen and examined this patient, and agree with above assessment and plan. Continued marked improvement  No TTP , abdom benign today. Diarrhea resolved w/ IV steroids  COnt Vanco x 21d total  Plan to give remicade tomorrow if stable  Switch steroids to po tomorrow  Cont reg diet  Anusol cream for large hemorrhoids, gradual improvement  Will contact office for recent remicade trough/Ab's  Pt concerned about paying for remicade as inpt, will ask social work to see.     Facundo Michaels MD

## 2017-02-03 LAB
ANION GAP BLD CALC-SCNC: 8 MMOL/L (ref 7–16)
BUN SERPL-MCNC: 3 MG/DL (ref 6–23)
CALCIUM SERPL-MCNC: 8.2 MG/DL (ref 8.3–10.4)
CHLORIDE SERPL-SCNC: 108 MMOL/L (ref 98–107)
CO2 SERPL-SCNC: 29 MMOL/L (ref 21–32)
CREAT SERPL-MCNC: 0.5 MG/DL (ref 0.8–1.5)
ERYTHROCYTE [DISTWIDTH] IN BLOOD BY AUTOMATED COUNT: 19.6 % (ref 11.9–14.6)
GLUCOSE SERPL-MCNC: 114 MG/DL (ref 65–100)
HCT VFR BLD AUTO: 27.3 % (ref 41.1–50.3)
HGB BLD-MCNC: 8.1 G/DL (ref 13.6–17.2)
MCH RBC QN AUTO: 24.5 PG (ref 26.1–32.9)
MCHC RBC AUTO-ENTMCNC: 29.7 G/DL (ref 31.4–35)
MCV RBC AUTO: 82.7 FL (ref 79.6–97.8)
PLATELET # BLD AUTO: 529 K/UL (ref 150–450)
PMV BLD AUTO: 9 FL (ref 10.8–14.1)
POTASSIUM SERPL-SCNC: 4.1 MMOL/L (ref 3.5–5.1)
RBC # BLD AUTO: 3.3 M/UL (ref 4.23–5.67)
SODIUM SERPL-SCNC: 145 MMOL/L (ref 136–145)
WBC # BLD AUTO: 12.4 K/UL (ref 4.3–11.1)

## 2017-02-03 PROCEDURE — 74011250637 HC RX REV CODE- 250/637: Performed by: PHYSICIAN ASSISTANT

## 2017-02-03 PROCEDURE — 74011636637 HC RX REV CODE- 636/637: Performed by: INTERNAL MEDICINE

## 2017-02-03 PROCEDURE — 74011250637 HC RX REV CODE- 250/637: Performed by: NURSE PRACTITIONER

## 2017-02-03 PROCEDURE — 74011250637 HC RX REV CODE- 250/637: Performed by: INTERNAL MEDICINE

## 2017-02-03 PROCEDURE — 36415 COLL VENOUS BLD VENIPUNCTURE: CPT | Performed by: INTERNAL MEDICINE

## 2017-02-03 PROCEDURE — 74011250636 HC RX REV CODE- 250/636: Performed by: INTERNAL MEDICINE

## 2017-02-03 PROCEDURE — 85027 COMPLETE CBC AUTOMATED: CPT | Performed by: INTERNAL MEDICINE

## 2017-02-03 PROCEDURE — 65270000029 HC RM PRIVATE

## 2017-02-03 PROCEDURE — 74011250636 HC RX REV CODE- 250/636: Performed by: NURSE PRACTITIONER

## 2017-02-03 PROCEDURE — 80048 BASIC METABOLIC PNL TOTAL CA: CPT | Performed by: INTERNAL MEDICINE

## 2017-02-03 RX ADMIN — Medication 10 ML: at 06:30

## 2017-02-03 RX ADMIN — VANCOMYCIN HYDROCHLORIDE 250 MG: 1 INJECTION, POWDER, LYOPHILIZED, FOR SOLUTION INTRAVENOUS at 17:28

## 2017-02-03 RX ADMIN — HYDROCORTISONE 2.5%: 25 CREAM TOPICAL at 17:27

## 2017-02-03 RX ADMIN — HYDROMORPHONE HYDROCHLORIDE 1.5 MG: 1 INJECTION, SOLUTION INTRAMUSCULAR; INTRAVENOUS; SUBCUTANEOUS at 09:05

## 2017-02-03 RX ADMIN — HYDROCODONE BITARTRATE AND ACETAMINOPHEN 1 TABLET: 10; 325 TABLET ORAL at 21:39

## 2017-02-03 RX ADMIN — HYDROMORPHONE HYDROCHLORIDE 1.5 MG: 1 INJECTION, SOLUTION INTRAMUSCULAR; INTRAVENOUS; SUBCUTANEOUS at 17:16

## 2017-02-03 RX ADMIN — HYDROMORPHONE HYDROCHLORIDE 1.5 MG: 1 INJECTION, SOLUTION INTRAMUSCULAR; INTRAVENOUS; SUBCUTANEOUS at 13:03

## 2017-02-03 RX ADMIN — HYDROCORTISONE 2.5%: 25 CREAM TOPICAL at 09:00

## 2017-02-03 RX ADMIN — SODIUM CHLORIDE AND POTASSIUM CHLORIDE: 9; 1.49 INJECTION, SOLUTION INTRAVENOUS at 09:06

## 2017-02-03 RX ADMIN — ZOLPIDEM TARTRATE 5 MG: 5 TABLET, FILM COATED ORAL at 21:39

## 2017-02-03 RX ADMIN — VANCOMYCIN HYDROCHLORIDE 250 MG: 1 INJECTION, POWDER, LYOPHILIZED, FOR SOLUTION INTRAVENOUS at 13:05

## 2017-02-03 RX ADMIN — Medication 10 ML: at 22:00

## 2017-02-03 RX ADMIN — HYDROMORPHONE HYDROCHLORIDE 1.5 MG: 1 INJECTION, SOLUTION INTRAMUSCULAR; INTRAVENOUS; SUBCUTANEOUS at 04:25

## 2017-02-03 RX ADMIN — MESALAMINE 2400 MG: 1.2 TABLET, DELAYED RELEASE ORAL at 09:04

## 2017-02-03 RX ADMIN — FOLIC ACID 1 MG: 1 TABLET ORAL at 09:04

## 2017-02-03 RX ADMIN — HYDROCORTISONE 2.5%: 25 CREAM TOPICAL at 13:00

## 2017-02-03 RX ADMIN — HYDROCORTISONE 2.5%: 25 CREAM TOPICAL at 22:00

## 2017-02-03 RX ADMIN — MESALAMINE 2400 MG: 1.2 TABLET, DELAYED RELEASE ORAL at 17:27

## 2017-02-03 RX ADMIN — PANTOPRAZOLE SODIUM 40 MG: 40 TABLET, DELAYED RELEASE ORAL at 06:27

## 2017-02-03 RX ADMIN — INFLIXIMAB 500 MG: 100 INJECTION, POWDER, LYOPHILIZED, FOR SOLUTION INTRAVENOUS at 17:24

## 2017-02-03 RX ADMIN — PREDNISONE 40 MG: 20 TABLET ORAL at 09:04

## 2017-02-03 RX ADMIN — Medication 10 ML: at 14:00

## 2017-02-03 RX ADMIN — VANCOMYCIN HYDROCHLORIDE 250 MG: 1 INJECTION, POWDER, LYOPHILIZED, FOR SOLUTION INTRAVENOUS at 06:27

## 2017-02-03 NOTE — PROGRESS NOTES
Spoke to Mr. Becky Moncada in room 636 (known from previous admit). He is a  working with paints/coatings for aircraft, and pursuing his [employer] through [employer]. His third dose of Remicade was to have been January 19, 2017, but due to C diff, he said it was not given at  [facility] (Dr. Romayne Hun) as planned, and may be given here, and he is concerned about how this may effect his approval he has received from [employer] for Remicade assistance. Advised him to call the  [employer] and tell them that, and he said he would. No other needs identified at this point.

## 2017-02-03 NOTE — PROGRESS NOTES
Patient restingin bed. Remicade started at 10cc/hr. Patient complained of pain Dilaudid 1.5mg given  Pain 9/10.   Visit Vitals    BP (!) 154/93 (BP 1 Location: Right arm, BP Patient Position: At rest)    Pulse 73    Temp 97.9 °F (36.6 °C)    Resp 18    Ht 5' 9\" (1.753 m)    Wt 94 kg (207 lb 3.2 oz)    SpO2 98%    BMI 30.6 kg/m2

## 2017-02-03 NOTE — PROGRESS NOTES
GI DAILY PROGRESS NOTE    Admit Date:  1/18/2017    Today's Date:  2/3/2017    CC:  UC, c difficile    Subjective:     Patient continues to feel improved. Tolerating diet. Concerned about IV site that appears to be infiltrating. Stools loose but not as frequent. Small amount of red blood with most stools. Agreeable to Remicade infusion today.     Medications:   Current Facility-Administered Medications   Medication Dose Route Frequency    inFLIXimab (REMICADE) 470 mg in 0.9% sodium chloride 250 mL infusion  5 mg/kg IntraVENous ONCE    predniSONE (DELTASONE) tablet 40 mg  40 mg Oral DAILY WITH BREAKFAST    hydrocortisone (ANUSOL-HC) 2.5 % rectal cream   PeriANAL QID    HYDROcodone-acetaminophen (NORCO)  mg tablet 1 Tab  1 Tab Oral Q6H PRN    0.9% sodium chloride infusion 250 mL  250 mL IntraVENous PRN    lidocaine (XYLOCAINE) 5 % ointment   Topical PRN    HYDROmorphone (PF) (DILAUDID) injection 1.5 mg  1.5 mg IntraVENous Q4H PRN    lidocaine (XYLOCAINE) 2 % jelly   Other QID PRN    vancomycin 50 mg/mL oral solution (compounded) 250 mg  250 mg Oral Q6H    hydrocortisone (ANUSOL-HC) suppository 25 mg  25 mg Rectal Q12H PRN    pantoprazole (PROTONIX) tablet 40 mg  40 mg Oral ACB    dicyclomine (BENTYL) capsule 10 mg  10 mg Oral QID PRN    calcium carbonate (TUMS) chewable tablet 200 mg [elemental]  200 mg Oral TID PRN    alum-mag hydroxide-simeth (MYLANTA) oral suspension 30 mL  30 mL Oral Q4H PRN    mesalamine (LIALDA) delayed release tablet 2,400 mg- PATIENT SUPPLIED  2.4 g Oral BID WITH MEALS    folic acid (FOLVITE) tablet 1 mg  1 mg Oral DAILY    promethazine (PHENERGAN) tablet 12.5 mg  12.5 mg Oral Q6H PRN    sodium chloride (NS) flush 5-10 mL  5-10 mL IntraVENous Q8H    sodium chloride (NS) flush 5-10 mL  5-10 mL IntraVENous PRN    ondansetron (ZOFRAN) injection 4 mg  4 mg IntraVENous Q4H PRN    zolpidem (AMBIEN) tablet 5 mg  5 mg Oral QHS PRN    0.9% sodium chloride with KCl 20 mEq/L infusion   IntraVENous CONTINUOUS       Review of Systems:  ROS was obtained, with pertinent positives as listed above. No chest pain or SOB. Diet:  Gi soft    Objective:   Vitals:  Visit Vitals    /79 (BP 1 Location: Right arm, BP Patient Position: At rest)    Pulse 68    Temp 98.7 °F (37.1 °C)    Resp 14    Ht 5' 9\" (1.753 m)    Wt 94 kg (207 lb 3.2 oz)    SpO2 98%    BMI 30.6 kg/m2     Intake/Output:     02/01 1901 - 02/03 0700  In: 0091 [P.O.:960; I.V.:3381]  Out: -   Exam:  General appearance: alert, cooperative, no distress  Skin : pale  Lungs: clear to auscultation bilaterally anteriorly  Heart: regular rate and rhythm  Abdomen: soft, non tender to exam.  Bowel sounds normal. No masses, no organomegaly  Extremities: extremities normal, atraumatic, no cyanosis or edema  Neuro:  alert and oriented    Data Review (Labs):    Recent Labs      02/03/17   0610  02/01/17   0631   WBC  12.4*  9.1   HGB  8.1*  8.7*   HCT  27.3*  28.8*   PLT  529*  531*   MCV  82.7  82.8   NA  145  143   K  4.1  4.1   CL  108*  106   CO2  29  26   BUN  3*  5*   CREA  0.50*  0.51*   CA  8.2*  8.4   GLU  114*  104*       Assessment:     Principal Problem:    Ulcerative colitis ([facility] [address] (1/18/2017)    Active Problems:    Rectal bleed (12/1/2016)      Abdominal pain (12/1/2016)      Fever (1/12/2017)      Acute blood loss anemia (1/18/2017)      Clostridium difficile diarrhea (1/18/2017)        Plan:     41 yo male is seen with UC and c difficile. Readmitted 1/18/17, now S/P flex sig 1/30/17 with apparent pseudomembranes present, however the underlying mucosa appeared to be most consistent with severe ulcerative colitis. Stools positive for C diff 1/12/17. He was started on Remicade therapy during a previous hospitalization on 12/9/16, and received the second dose on 12/22/16. He was due for 3rd dose on 1/19/17, held due to [address]. He is has been on Flagyl with Vancomycin added 1/19/17.   Changed from oral to IV steroids 1/31/17, now with SoluMedrol 40 mg IV q12 hr.  Hgb low but stable following 1 unit PRBC transfusion earlier this week. 1.  Day # 16 Vancomycin; Flagyl dc'd 1/31/17. Continue Vancomycin for 21 days  2. Give third Remicade infusion today. 3.  Change to oral steroids today. Patient is seen and examined in collaboration with Dr. Oscar Olvera. Assessment and plan as per Dr. Oscar Olvera. Rizwana Sandoval NP        I have seen and examined this patient, and agree with above assessment and plan. Stools more formed now.    Consider stool softener if needed to avoid straining w/ hemorrhoids  Possibly home in 1-2 days    Janak Ibarra MD

## 2017-02-03 NOTE — PROGRESS NOTES
No PIV access currently. RN x2 attempted. VAT was called for their best efforts. They have 2 PICCs ahead of this request, and will come asa. Thank you very much VAT for your efforts. PharmD updated. 1456- Rapid Admission RN,Nano GARCIA also attempted PIV twice, thank you Petey Mcneill. Awaiting VAT.

## 2017-02-04 PROBLEM — K64.8 PROLAPSED HEMORRHOIDS: Status: ACTIVE | Noted: 2017-02-04

## 2017-02-04 PROBLEM — Z79.52 CURRENT CHRONIC USE OF SYSTEMIC STEROIDS: Status: ACTIVE | Noted: 2017-02-04

## 2017-02-04 PROCEDURE — 74011250637 HC RX REV CODE- 250/637: Performed by: INTERNAL MEDICINE

## 2017-02-04 PROCEDURE — 74011250636 HC RX REV CODE- 250/636: Performed by: INTERNAL MEDICINE

## 2017-02-04 PROCEDURE — 74011250637 HC RX REV CODE- 250/637: Performed by: PHYSICIAN ASSISTANT

## 2017-02-04 PROCEDURE — 74011250637 HC RX REV CODE- 250/637: Performed by: NURSE PRACTITIONER

## 2017-02-04 PROCEDURE — 65270000029 HC RM PRIVATE

## 2017-02-04 PROCEDURE — 74011636637 HC RX REV CODE- 636/637: Performed by: INTERNAL MEDICINE

## 2017-02-04 PROCEDURE — 74011250637 HC RX REV CODE- 250/637: Performed by: SURGERY

## 2017-02-04 PROCEDURE — 94762 N-INVAS EAR/PLS OXIMTRY CONT: CPT

## 2017-02-04 RX ORDER — HYDROMORPHONE HYDROCHLORIDE 1 MG/ML
2 INJECTION, SOLUTION INTRAMUSCULAR; INTRAVENOUS; SUBCUTANEOUS
Status: DISCONTINUED | OUTPATIENT
Start: 2017-02-04 | End: 2017-02-07 | Stop reason: HOSPADM

## 2017-02-04 RX ORDER — DICYCLOMINE HYDROCHLORIDE 10 MG/1
10 CAPSULE ORAL 4 TIMES DAILY
Status: DISCONTINUED | OUTPATIENT
Start: 2017-02-04 | End: 2017-02-07 | Stop reason: HOSPADM

## 2017-02-04 RX ORDER — HYDROCORTISONE 25 MG/G
CREAM TOPICAL
Status: DISCONTINUED | OUTPATIENT
Start: 2017-02-04 | End: 2017-02-07 | Stop reason: HOSPADM

## 2017-02-04 RX ADMIN — Medication 10 ML: at 13:37

## 2017-02-04 RX ADMIN — MESALAMINE 2400 MG: 1.2 TABLET, DELAYED RELEASE ORAL at 08:02

## 2017-02-04 RX ADMIN — SODIUM CHLORIDE AND POTASSIUM CHLORIDE: 9; 1.49 INJECTION, SOLUTION INTRAVENOUS at 17:41

## 2017-02-04 RX ADMIN — HYDROCORTISONE 2.5%: 25 CREAM TOPICAL at 17:35

## 2017-02-04 RX ADMIN — HYDROCORTISONE 2.5%: 25 CREAM TOPICAL at 20:00

## 2017-02-04 RX ADMIN — HYDROCORTISONE 2.5%: 25 CREAM TOPICAL at 13:00

## 2017-02-04 RX ADMIN — MESALAMINE 2400 MG: 1.2 TABLET, DELAYED RELEASE ORAL at 17:36

## 2017-02-04 RX ADMIN — VANCOMYCIN HYDROCHLORIDE 250 MG: 1 INJECTION, POWDER, LYOPHILIZED, FOR SOLUTION INTRAVENOUS at 13:37

## 2017-02-04 RX ADMIN — HYDROMORPHONE HYDROCHLORIDE 1.5 MG: 1 INJECTION, SOLUTION INTRAMUSCULAR; INTRAVENOUS; SUBCUTANEOUS at 01:25

## 2017-02-04 RX ADMIN — HYDROCORTISONE 2.5%: 25 CREAM TOPICAL at 08:02

## 2017-02-04 RX ADMIN — VANCOMYCIN HYDROCHLORIDE 250 MG: 1 INJECTION, POWDER, LYOPHILIZED, FOR SOLUTION INTRAVENOUS at 02:01

## 2017-02-04 RX ADMIN — HYDROMORPHONE HYDROCHLORIDE 2 MG: 1 INJECTION, SOLUTION INTRAMUSCULAR; INTRAVENOUS; SUBCUTANEOUS at 20:40

## 2017-02-04 RX ADMIN — VANCOMYCIN HYDROCHLORIDE 250 MG: 1 INJECTION, POWDER, LYOPHILIZED, FOR SOLUTION INTRAVENOUS at 06:00

## 2017-02-04 RX ADMIN — HYDROMORPHONE HYDROCHLORIDE 2 MG: 1 INJECTION, SOLUTION INTRAMUSCULAR; INTRAVENOUS; SUBCUTANEOUS at 10:23

## 2017-02-04 RX ADMIN — DICYCLOMINE HYDROCHLORIDE 10 MG: 10 CAPSULE ORAL at 20:53

## 2017-02-04 RX ADMIN — HYDROMORPHONE HYDROCHLORIDE 1.5 MG: 1 INJECTION, SOLUTION INTRAMUSCULAR; INTRAVENOUS; SUBCUTANEOUS at 05:48

## 2017-02-04 RX ADMIN — HYDROMORPHONE HYDROCHLORIDE 2 MG: 1 INJECTION, SOLUTION INTRAMUSCULAR; INTRAVENOUS; SUBCUTANEOUS at 15:49

## 2017-02-04 RX ADMIN — VANCOMYCIN HYDROCHLORIDE 250 MG: 1 INJECTION, POWDER, LYOPHILIZED, FOR SOLUTION INTRAVENOUS at 17:35

## 2017-02-04 RX ADMIN — PANTOPRAZOLE SODIUM 40 MG: 40 TABLET, DELAYED RELEASE ORAL at 05:47

## 2017-02-04 RX ADMIN — ZOLPIDEM TARTRATE 5 MG: 5 TABLET, FILM COATED ORAL at 23:27

## 2017-02-04 RX ADMIN — FOLIC ACID 1 MG: 1 TABLET ORAL at 08:02

## 2017-02-04 RX ADMIN — PREDNISONE 40 MG: 20 TABLET ORAL at 08:02

## 2017-02-04 RX ADMIN — HYDROCORTISONE 2.5%: 25 CREAM TOPICAL at 22:00

## 2017-02-04 RX ADMIN — DICYCLOMINE HYDROCHLORIDE 10 MG: 10 CAPSULE ORAL at 13:34

## 2017-02-04 RX ADMIN — DICYCLOMINE HYDROCHLORIDE 10 MG: 10 CAPSULE ORAL at 17:35

## 2017-02-04 RX ADMIN — DICYCLOMINE HYDROCHLORIDE 10 MG: 10 CAPSULE ORAL at 05:47

## 2017-02-04 RX ADMIN — VANCOMYCIN HYDROCHLORIDE 250 MG: 1 INJECTION, POWDER, LYOPHILIZED, FOR SOLUTION INTRAVENOUS at 23:26

## 2017-02-04 NOTE — PROGRESS NOTES
GI DAILY PROGRESS NOTE    Admit Date:  1/18/2017    Today's Date:  2/4/2017    CC:  UC, C difficile    Subjective:     Patient continues to feel improved. Tolerating diet. Stools more formed. Severe pain with  Small amount of red blood with most stools. Agreeable to Remicade infusion today. Medications:   Current Facility-Administered Medications   Medication Dose Route Frequency    predniSONE (DELTASONE) tablet 40 mg  40 mg Oral DAILY WITH BREAKFAST    hydrocortisone (ANUSOL-HC) 2.5 % rectal cream   PeriANAL QID    HYDROcodone-acetaminophen (NORCO)  mg tablet 1 Tab  1 Tab Oral Q6H PRN    0.9% sodium chloride infusion 250 mL  250 mL IntraVENous PRN    lidocaine (XYLOCAINE) 5 % ointment   Topical PRN    HYDROmorphone (PF) (DILAUDID) injection 1.5 mg  1.5 mg IntraVENous Q4H PRN    lidocaine (XYLOCAINE) 2 % jelly   Other QID PRN    vancomycin 50 mg/mL oral solution (compounded) 250 mg  250 mg Oral Q6H    hydrocortisone (ANUSOL-HC) suppository 25 mg  25 mg Rectal Q12H PRN    pantoprazole (PROTONIX) tablet 40 mg  40 mg Oral ACB    dicyclomine (BENTYL) capsule 10 mg  10 mg Oral QID PRN    calcium carbonate (TUMS) chewable tablet 200 mg [elemental]  200 mg Oral TID PRN    alum-mag hydroxide-simeth (MYLANTA) oral suspension 30 mL  30 mL Oral Q4H PRN    mesalamine (LIALDA) delayed release tablet 2,400 mg- PATIENT SUPPLIED  2.4 g Oral BID WITH MEALS    folic acid (FOLVITE) tablet 1 mg  1 mg Oral DAILY    promethazine (PHENERGAN) tablet 12.5 mg  12.5 mg Oral Q6H PRN    sodium chloride (NS) flush 5-10 mL  5-10 mL IntraVENous Q8H    sodium chloride (NS) flush 5-10 mL  5-10 mL IntraVENous PRN    ondansetron (ZOFRAN) injection 4 mg  4 mg IntraVENous Q4H PRN    zolpidem (AMBIEN) tablet 5 mg  5 mg Oral QHS PRN    0.9% sodium chloride with KCl 20 mEq/L infusion   IntraVENous CONTINUOUS       Review of Systems:  ROS was obtained, with pertinent positives as listed above.   No chest pain or SOB.    Diet:  Gi soft    Objective:   Vitals:  Visit Vitals    /81 (BP 1 Location: Right arm, BP Patient Position: At rest)    Pulse 73    Temp 98.2 °F (36.8 °C)    Resp 18    Ht 5' 9\" (1.753 m)    Wt 94 kg (207 lb 3.2 oz)    SpO2 97%    BMI 30.6 kg/m2     Intake/Output:     02/02 1901 - 02/04 0700  In: 6942 [P.O.:360; I.V.:2918]  Out: -   Exam:  General appearance: alert, cooperative, no distress  Skin : pale  Lungs: clear to auscultation bilaterally anteriorly  Heart: regular rate and rhythm  Abdomen: soft, non tender to exam.  Bowel sounds normal. No masses, no organomegaly  Extremities: extremities normal, atraumatic, no cyanosis or edema  Neuro:  alert and oriented    Data Review (Labs):    Recent Labs      02/03/17   0610   WBC  12.4*   HGB  8.1*   HCT  27.3*   PLT  529*   MCV  82.7   NA  145   K  4.1   CL  108*   CO2  29   BUN  3*   CREA  0.50*   CA  8.2*   GLU  114*     Flex sig 1/30/17 with severe ulcerative colitis, C diff improved        Assessment:     Principal Problem:    Ulcerative colitis ([address] (1/18/2017)    Active Problems:    Rectal bleed (12/1/2016)      Abdominal pain (12/1/2016)      Fever (1/12/2017)      Acute blood loss anemia (1/18/2017)      Clostridium difficile diarrhea (1/18/2017)    External hemorrhoids    Plan:     Stools more formed now. S/P Remicade yesterday for UC with no difficulty  Severe pain from hemorrhoids, requiring dilaudid  Treating w/ steroid cream and lidocane gel for the past week w/ no improvement  Suspect thombosed hemorrhoid - may need decompression. Will as surgery to see again  WBC up slightly today- may be steroids. Afebrile. Cont 5-asa. Make bentyl qid scheduled.     Megan Mcmillan MD

## 2017-02-04 NOTE — CONSULTS
[facility]  [facility] [address] [place] [address], [address] [facility]  [phone]    Office Note/H&P/Consult Note   Paul Mcgarry   MRN: [id number]     : 1970        HPI: Paul Mcgarry is a 40 y.o. male who we were consulted to see regarding large, painful prolapsed hemorrhoidal disease in the setting of rectal bleeding and U.C on immune suppression. He has a PMHx of Ulcerative Colitis, C.diff colitis and was admitted for drop in HGB to 7.0 with hypotension and bloody stools. He had lower abdominal pain with increase in blood per rectum and began feeling \"faint\". He fell at home and his wife was concerned and checked his BP which was in the 00'T systolic and his temp was 39C. She called 911. On arrival, he was hypotensive with BP 91/54, Temp 37.1C. He has received NS bolus. BP has improved to 109/57. HGB is 7.0 which is a drop from 8.4 yesterday. He has agreed to transfusion. WBC is up to 26.8 from 12.7. He reports lower abdominal cramping pain. Reports increase in number of stools overnight with increasing amount of blood in stool. Potassium is 3.0 on arrival and he has been replaced orally. He has been admitted and treated by GI. He is on Remicade and Prednisone.   he has developed painful, prolapsed hemorrhoids while hospitalized. The pain is severe at time as and without radiation. Nothing seems to make it better. It is worse with pressure or palpation and he has had multiple BMs per day which makes the pain-issues worse. He has no recent associated fever.       Past Medical History   Diagnosis Date    Ulcerative colitis [facility])      Past Surgical History   Procedure Laterality Date    Flexible sigmoidoscopy N/A 2016     SIGMOIDOSCOPY FLEXIBLE performed by Ira Smith MD at [facility] ENDOSCOPY    Flexible sigmoidoscopy N/A 2017     SIGMOIDOSCOPY FLEXIBLE performed by Dae Trotter MD at [facility] ENDOSCOPY    Flexible sigmoidoscopy N/A 2017 SIGMOIDOSCOPY FLEXIBLE/ 31/ 636 performed by Martha Mccarthy MD at [facility] ENDOSCOPY    Flexible sigmoidoscopy N/A 1/30/2017     SIGMOIDOSCOPY FLEXIBLE performed by Brigitte Booth MD at [facility] ENDOSCOPY     Current Facility-Administered Medications   Medication Dose Route Frequency    dicyclomine (BENTYL) capsule 10 mg  10 mg Oral QID    HYDROmorphone (PF) (DILAUDID) injection 2 mg  2 mg IntraVENous Q4H PRN    predniSONE (DELTASONE) tablet 40 mg  40 mg Oral DAILY WITH BREAKFAST    hydrocortisone (ANUSOL-HC) 2.5 % rectal cream   PeriANAL QID    HYDROcodone-acetaminophen (NORCO)  mg tablet 1 Tab  1 Tab Oral Q6H PRN    0.9% sodium chloride infusion 250 mL  250 mL IntraVENous PRN    lidocaine (XYLOCAINE) 5 % ointment   Topical PRN    lidocaine (XYLOCAINE) 2 % jelly   Other QID PRN    vancomycin 50 mg/mL oral solution (compounded) 250 mg  250 mg Oral Q6H    hydrocortisone (ANUSOL-HC) suppository 25 mg  25 mg Rectal Q12H PRN    pantoprazole (PROTONIX) tablet 40 mg  40 mg Oral ACB    calcium carbonate (TUMS) chewable tablet 200 mg [elemental]  200 mg Oral TID PRN    alum-mag hydroxide-simeth (MYLANTA) oral suspension 30 mL  30 mL Oral Q4H PRN    mesalamine (LIALDA) delayed release tablet 2,400 mg- PATIENT SUPPLIED  2.4 g Oral BID WITH MEALS    folic acid (FOLVITE) tablet 1 mg  1 mg Oral DAILY    promethazine (PHENERGAN) tablet 12.5 mg  12.5 mg Oral Q6H PRN    sodium chloride (NS) flush 5-10 mL  5-10 mL IntraVENous Q8H    sodium chloride (NS) flush 5-10 mL  5-10 mL IntraVENous PRN    ondansetron (ZOFRAN) injection 4 mg  4 mg IntraVENous Q4H PRN    zolpidem (AMBIEN) tablet 5 mg  5 mg Oral QHS PRN    0.9% sodium chloride with KCl 20 mEq/L infusion   IntraVENous CONTINUOUS     ALLERGIES:  Ibuprofen and Sulfa (sulfonamide antibiotics)    Social History     Social History    Marital status:      Spouse name: N/A    Number of children: N/A    Years of education: N/A     Social History Main Topics    Smoking status: Never Smoker    Smokeless tobacco: None    Alcohol use Yes    Drug use: No    Sexual activity: Not Asked     Other Topics Concern    None     Social History Narrative     History   Smoking Status    Never Smoker   Smokeless Tobacco    Not on file     History reviewed. No pertinent family history. ROS: The patient has no difficulty with chest pain or shortness of breath. No fever or chills. Comprehensive review of systems was otherwise unremarkable except as noted above. Physical Exam:   Constitutional: Alert oriented cooperative patient in no acute distress. Visit Vitals    /74 (BP 1 Location: Right arm, BP Patient Position: At rest)    Pulse 68    Temp 97.9 °F (36.6 °C)    Resp 16    Ht 5' 9\" (1.753 m)    Wt 207 lb 3.2 oz (94 kg)    SpO2 98%    BMI 30.6 kg/m2     Eyes:Sclera are clear. ENMT: no obvious neck masses, no ear or lip lesions  CV: RRR. Normal perfusion  Resp: No JVD. Breathing is  non-labored. GI: soft and non-distended   Multiple large, prolapsed circumferential hemorrhoids    Musculoskeletal: unremarkable with normal function. Neuro:  No obvious focal deficits  Psychiatric: normal affect and mood, no memory impairment      Labs:  Lab Results   Component Value Date/Time    WBC 12.4 02/03/2017 06:10 AM    PLATELET 870 30/79/1602 06:10 AM    Sodium 145 02/03/2017 06:10 AM    Potassium 4.1 02/03/2017 06:10 AM    Chloride 108 02/03/2017 06:10 AM    CO2 29 02/03/2017 06:10 AM    BUN 3 02/03/2017 06:10 AM    Creatinine 0.50 02/03/2017 06:10 AM    Glucose 114 02/03/2017 06:10 AM    INR 1.2 01/18/2017 02:00 PM    Bilirubin, total 0.2 01/21/2017 07:40 AM    AST (SGOT) 7 01/21/2017 07:40 AM    ALT (SGPT) 9 01/21/2017 07:40 AM    Alk. phosphatase 55 01/21/2017 07:40 AM    Lipase 60 01/12/2017 10:45 AM       No results found for this or any previous visit.       Assessment/Plan:     )Ayesha Aponte is a 40 y.o. male who has signs and symptoms consistent with the below issues:  Problem List  Date Reviewed: 1/21/2017          Codes Class Noted    Prolapsed hemorrhoids ICD-10-CM: K64.8  ICD-9-CM: 455.8  2/4/2017        Current chronic use of systemic steroids ICD-10-CM: Z79.52  ICD-9-CM: V58.65  2/4/2017        Acute blood loss anemia ICD-10-CM: D62  ICD-9-CM: 285.1  1/18/2017        * (Principal)Ulcerative colitis ([facility] ICD-10-CM: K51.90  ICD-9-CM: 556.9  1/18/2017        Clostridium difficile diarrhea ICD-10-CM: A04.7  ICD-9-CM: 008.45  1/18/2017        Rectal bleeding ICD-10-CM: K62.5  ICD-9-CM: 569.3  1/12/2017        Colitis, ulcerative chronic ([facility] ICD-10-CM: K51.90  ICD-9-CM: 556.9  1/12/2017        Fever ICD-10-CM: R50.9  ICD-9-CM: 780.60  1/12/2017        Ulcerative colitis, chronic ([facility] ICD-10-CM: K51.90  ICD-9-CM: 556.9  1/12/2017        UC (ulcerative colitis) ([facility] ICD-10-CM: K51.90  ICD-9-CM: 556.9  12/1/2016        Rectal bleed ICD-10-CM: K62.5  ICD-9-CM: 569.3  12/1/2016        Diarrhea ICD-10-CM: R19.7  ICD-9-CM: 787.91  12/1/2016        Abdominal pain ICD-10-CM: R10.9  ICD-9-CM: 789.00  12/1/2016              Copious applications of 2.5% hydrocortisone the perianal hemorrhoids. These are swollen, prolapsed, and edematous but not thrombosed at this point.     I will watch with you and offer I&D if he develops thrombosis  Ideally would be able to manage this non-operatively given the Remicade/Prednisone/Immune suppression   to lower risk of a post-op wound sepsis      Ric Denney MD,  FACS

## 2017-02-05 PROCEDURE — 65270000029 HC RM PRIVATE

## 2017-02-05 PROCEDURE — 74011250637 HC RX REV CODE- 250/637: Performed by: PHYSICIAN ASSISTANT

## 2017-02-05 PROCEDURE — 74011250637 HC RX REV CODE- 250/637: Performed by: INTERNAL MEDICINE

## 2017-02-05 PROCEDURE — 94762 N-INVAS EAR/PLS OXIMTRY CONT: CPT

## 2017-02-05 PROCEDURE — 74011636637 HC RX REV CODE- 636/637: Performed by: INTERNAL MEDICINE

## 2017-02-05 PROCEDURE — 74011250636 HC RX REV CODE- 250/636: Performed by: INTERNAL MEDICINE

## 2017-02-05 PROCEDURE — 74011250637 HC RX REV CODE- 250/637: Performed by: SURGERY

## 2017-02-05 PROCEDURE — 74011250637 HC RX REV CODE- 250/637: Performed by: NURSE PRACTITIONER

## 2017-02-05 RX ADMIN — MESALAMINE 2400 MG: 1.2 TABLET, DELAYED RELEASE ORAL at 08:44

## 2017-02-05 RX ADMIN — HYDROCORTISONE 2.5%: 25 CREAM TOPICAL at 06:00

## 2017-02-05 RX ADMIN — HYDROMORPHONE HYDROCHLORIDE 2 MG: 1 INJECTION, SOLUTION INTRAMUSCULAR; INTRAVENOUS; SUBCUTANEOUS at 20:58

## 2017-02-05 RX ADMIN — HYDROCORTISONE 2.5%: 25 CREAM TOPICAL at 08:43

## 2017-02-05 RX ADMIN — HYDROMORPHONE HYDROCHLORIDE 2 MG: 1 INJECTION, SOLUTION INTRAMUSCULAR; INTRAVENOUS; SUBCUTANEOUS at 13:14

## 2017-02-05 RX ADMIN — DICYCLOMINE HYDROCHLORIDE 10 MG: 10 CAPSULE ORAL at 13:13

## 2017-02-05 RX ADMIN — HYDROCORTISONE 2.5%: 25 CREAM TOPICAL at 22:00

## 2017-02-05 RX ADMIN — DICYCLOMINE HYDROCHLORIDE 10 MG: 10 CAPSULE ORAL at 08:43

## 2017-02-05 RX ADMIN — HYDROCORTISONE 2.5%: 25 CREAM TOPICAL at 20:00

## 2017-02-05 RX ADMIN — HYDROCORTISONE 2.5%: 25 CREAM TOPICAL at 12:00

## 2017-02-05 RX ADMIN — VANCOMYCIN HYDROCHLORIDE 250 MG: 1 INJECTION, POWDER, LYOPHILIZED, FOR SOLUTION INTRAVENOUS at 23:04

## 2017-02-05 RX ADMIN — HYDROMORPHONE HYDROCHLORIDE 2 MG: 1 INJECTION, SOLUTION INTRAMUSCULAR; INTRAVENOUS; SUBCUTANEOUS at 05:59

## 2017-02-05 RX ADMIN — HYDROCORTISONE 2.5%: 25 CREAM TOPICAL at 16:00

## 2017-02-05 RX ADMIN — HYDROCORTISONE 2.5%: 25 CREAM TOPICAL at 10:00

## 2017-02-05 RX ADMIN — Medication 10 ML: at 14:00

## 2017-02-05 RX ADMIN — MESALAMINE 2400 MG: 1.2 TABLET, DELAYED RELEASE ORAL at 17:31

## 2017-02-05 RX ADMIN — HYDROCORTISONE 2.5%: 25 CREAM TOPICAL at 00:43

## 2017-02-05 RX ADMIN — FOLIC ACID 1 MG: 1 TABLET ORAL at 08:43

## 2017-02-05 RX ADMIN — ZOLPIDEM TARTRATE 5 MG: 5 TABLET, FILM COATED ORAL at 23:04

## 2017-02-05 RX ADMIN — PANTOPRAZOLE SODIUM 40 MG: 40 TABLET, DELAYED RELEASE ORAL at 05:54

## 2017-02-05 RX ADMIN — Medication 10 ML: at 22:00

## 2017-02-05 RX ADMIN — HYDROCORTISONE 2.5%: 25 CREAM TOPICAL at 18:00

## 2017-02-05 RX ADMIN — SODIUM CHLORIDE AND POTASSIUM CHLORIDE: 9; 1.49 INJECTION, SOLUTION INTRAVENOUS at 08:49

## 2017-02-05 RX ADMIN — PREDNISONE 40 MG: 20 TABLET ORAL at 08:43

## 2017-02-05 RX ADMIN — VANCOMYCIN HYDROCHLORIDE 250 MG: 1 INJECTION, POWDER, LYOPHILIZED, FOR SOLUTION INTRAVENOUS at 05:54

## 2017-02-05 RX ADMIN — VANCOMYCIN HYDROCHLORIDE 250 MG: 1 INJECTION, POWDER, LYOPHILIZED, FOR SOLUTION INTRAVENOUS at 17:30

## 2017-02-05 RX ADMIN — DICYCLOMINE HYDROCHLORIDE 10 MG: 10 CAPSULE ORAL at 20:58

## 2017-02-05 RX ADMIN — VANCOMYCIN HYDROCHLORIDE 250 MG: 1 INJECTION, POWDER, LYOPHILIZED, FOR SOLUTION INTRAVENOUS at 13:18

## 2017-02-05 RX ADMIN — DICYCLOMINE HYDROCHLORIDE 10 MG: 10 CAPSULE ORAL at 17:30

## 2017-02-05 RX ADMIN — HYDROCORTISONE 2.5%: 25 CREAM TOPICAL at 14:00

## 2017-02-05 NOTE — PROGRESS NOTES
Pt re-instructed on sitz bath per order, sitz bath BID. Pt reports he is using sitz bath as ordered and will do again tonight.

## 2017-02-05 NOTE — PROGRESS NOTES
GI DAILY PROGRESS NOTE    Admit Date:  1/18/2017    Today's Date:  2/5/2017    CC:  UC, C diff    Subjective:     Patient has had decreased abdominal pain since Remicade on 3 Feb 2017, but not resolved. He continues to have rectal pain, describing the overall pain as \"80%\" from the rectal pain and \"20%\" from the abd pain. He denies any nausea or vomiting and is tolerating his diet. He has had about 2 BMs, loose but becoming more formed, and with only drops of BRB. The hemorrhoid cream is decreasing the pain, but not resolving it.     Medications:   Current Facility-Administered Medications   Medication Dose Route Frequency    dicyclomine (BENTYL) capsule 10 mg  10 mg Oral QID    HYDROmorphone (PF) (DILAUDID) injection 2 mg  2 mg IntraVENous Q4H PRN    hydrocortisone (ANUSOL-HC) 2.5 % rectal cream   PeriANAL Q2HWA    predniSONE (DELTASONE) tablet 40 mg  40 mg Oral DAILY WITH BREAKFAST    HYDROcodone-acetaminophen (NORCO)  mg tablet 1 Tab  1 Tab Oral Q6H PRN    0.9% sodium chloride infusion 250 mL  250 mL IntraVENous PRN    lidocaine (XYLOCAINE) 5 % ointment   Topical PRN    lidocaine (XYLOCAINE) 2 % jelly   Other QID PRN    vancomycin 50 mg/mL oral solution (compounded) 250 mg  250 mg Oral Q6H    hydrocortisone (ANUSOL-HC) suppository 25 mg  25 mg Rectal Q12H PRN    pantoprazole (PROTONIX) tablet 40 mg  40 mg Oral ACB    calcium carbonate (TUMS) chewable tablet 200 mg [elemental]  200 mg Oral TID PRN    alum-mag hydroxide-simeth (MYLANTA) oral suspension 30 mL  30 mL Oral Q4H PRN    mesalamine (LIALDA) delayed release tablet 2,400 mg- PATIENT SUPPLIED  2.4 g Oral BID WITH MEALS    folic acid (FOLVITE) tablet 1 mg  1 mg Oral DAILY    promethazine (PHENERGAN) tablet 12.5 mg  12.5 mg Oral Q6H PRN    sodium chloride (NS) flush 5-10 mL  5-10 mL IntraVENous Q8H    sodium chloride (NS) flush 5-10 mL  5-10 mL IntraVENous PRN    ondansetron (ZOFRAN) injection 4 mg  4 mg IntraVENous Q4H PRN    zolpidem (AMBIEN) tablet 5 mg  5 mg Oral QHS PRN    0.9% sodium chloride with KCl 20 mEq/L infusion   IntraVENous CONTINUOUS       Review of Systems:  ROS was obtained, with pertinent positives as listed above. No chest pain or SOB. Diet:  GI soft    Objective:   Vitals:  Visit Vitals    /68 (BP 1 Location: Right arm, BP Patient Position: At rest)    Pulse 70    Temp 98.2 °F (36.8 °C)    Resp 14    Ht 5' 9\" (1.753 m)    Wt 94 kg (207 lb 3.2 oz)    SpO2 98%    BMI 30.6 kg/m2     Intake/Output:     02/03 1901 - 02/05 0700  In: 9301 [P.O.:120; I.V.:2383]  Out: -   Exam:  General appearance: alert, cooperative, no distress, lying in bed  Lungs: clear to auscultation bilaterally anteriorly  Heart: regular rate and rhythm  Abdomen: soft, mildly tender over lower abd. Bowel sounds normal. No masses, no organomegaly  Neuro:  alert and oriented    Data Review (Labs):    Recent Labs      02/03/17   0610   WBC  12.4*   HGB  8.1*   HCT  27.3*   PLT  529*   MCV  82.7   NA  145   K  4.1   CL  108*   CO2  29   BUN  3*   CREA  0.50*   CA  8.2*   GLU  114*     Colon bxs 30 Jan 2017: COLON BIOPSY: ACUTE COLITIS WITH ULCERATION. Assessment:     Principal Problem:    Ulcerative colitis ([address] (1/18/2017)    Active Problems:    Rectal bleed (12/1/2016)      Abdominal pain (12/1/2016)      Fever (1/12/2017)      Acute blood loss anemia (1/18/2017)      Clostridium difficile diarrhea (1/18/2017)      Prolapsed hemorrhoids (2/4/2017)      Current chronic use of systemic steroids (2/4/2017)    39 yo male of Dr. Claudette Hunting with PMH of UC, C diff, who was readmitted on 1/18/17 for anemia, hypotension, rectal bleeding with UC and C difficile. S/P Flex sig 1/30/17 with apparent pseudomembranes present, however the underlying mucosa appeared to be most consistent with severe ulcerative colitis. Bxs with acute colitis and ulceration. Stools positive for C diff 1/12/17.  He was started on Remicade therapy during a previous hospitalization on 12/9/16, and received the second dose on 12/22/16. He was due for 3rd dose on 1/19/17, but held due to [address] [place] [address]. He has been on Flagyl, with Vancomycin added 1/19/17, and was changed from oral to IV steroids 1/31/17. He has been back on po steroids since 3 Feb 2017. Hgb low, but stable following 1 unit PRBC transfusion 31 Jan 2017. Remicade was given on 3 Feb 2017. He is slowly improving and hemorrhoids have now been the more significant source of pain, though improving with Anusol cream.    Plan:     -Bentyl 10 mg po 4 times a day.  -Lialda 2.4 gms po BID. -Protonix 40 mg po daily.  -Prednisone 40 mg po daily. -Vanc 250 mg solution po Q6hrs.  -Anusol cream apply Q2hrs while awake per surgery. Appreciate surgery evaluation of hemorrhoids.  -Recheck CBC and BMP. -GI soft diet.  -Follow. Patient is seen and examined in collaboration with Dr. Dolores Pablo. Assessment and plan as per Dr. Raquel Reyes. Emily Barbour PA-C  [facility]    I have seen and examined this patient, and agree with above assessment and plan. When asked about bowel movements, he reports multiple bowel movements daily, but these are actually \"just gas\". Only 1-2 stools daily. More form, minimal blood.   Significant pain with bowel movements or gas from hemorrhoids  Currently reports 80% of his pain is from hemorrhoids, 20% left lower quadrant abdomen  Continue oral steroids  Head to increase dilaudid yesterday for hemorrhoid pain  We discussed switching to oral regimen in the next one to 2 days  Once this is accomplished, potential discharge home  Continue Remicade every 8 weeks, continue Francheska Flores MD

## 2017-02-05 NOTE — PROGRESS NOTES
[facility]  [address] [place] [address]  [phone]    Office Note/H&P/Consult Note   Nelson Hassan   MRN: [id number]     : 1970        HPI: Nelson Hassan is a 40 y.o. male who we were consulted to see regarding large, painful prolapsed hemorrhoidal disease in the setting of rectal bleeding and U.C on immune suppression. He has a PMHx of Ulcerative Colitis, C.diff colitis and was admitted for drop in HGB to 7.0 with hypotension and bloody stools. He had lower abdominal pain with increase in blood per rectum and began feeling \"faint\". He fell at home and his wife was concerned and checked his BP which was in the 23'A systolic and his temp was 39C. She called 911. On arrival, he was hypotensive with BP 91/54, Temp 37.1C. He has received NS bolus. BP has improved to 109/57. HGB is 7.0 which is a drop from 8.4 yesterday. He has agreed to transfusion. WBC is up to 26.8 from 12.7. He reports lower abdominal cramping pain. Reports increase in number of stools overnight with increasing amount of blood in stool. Potassium is 3.0 on arrival and he has been replaced orally. He has been admitted and treated by GI. He is on Remicade and Prednisone.   he has developed painful, prolapsed hemorrhoids while hospitalized. The pain is severe at time as and without radiation. Nothing seems to make it better. It is worse with pressure or palpation and he has had multiple BMs per day which makes the pain-issues worse. He has no recent associated fever.       Past Medical History   Diagnosis Date    Ulcerative colitis [facility])      Past Surgical History   Procedure Laterality Date    Flexible sigmoidoscopy N/A 2016     SIGMOIDOSCOPY FLEXIBLE performed by Milly Chavez MD at [facility] ENDOSCOPY    Flexible sigmoidoscopy N/A 2017     SIGMOIDOSCOPY FLEXIBLE performed by Kofi Drake MD at [facility] ENDOSCOPY    Flexible sigmoidoscopy N/A 2017 SIGMOIDOSCOPY FLEXIBLE/ 31/ 636 performed by Eliezer Whiteside MD at [facility] ENDOSCOPY    Flexible sigmoidoscopy N/A 1/30/2017     SIGMOIDOSCOPY FLEXIBLE performed by Vivien Benitez MD at [facility] ENDOSCOPY     Current Facility-Administered Medications   Medication Dose Route Frequency    dicyclomine (BENTYL) capsule 10 mg  10 mg Oral QID    HYDROmorphone (PF) (DILAUDID) injection 2 mg  2 mg IntraVENous Q4H PRN    hydrocortisone (ANUSOL-HC) 2.5 % rectal cream   PeriANAL Q2HWA    predniSONE (DELTASONE) tablet 40 mg  40 mg Oral DAILY WITH BREAKFAST    HYDROcodone-acetaminophen (NORCO)  mg tablet 1 Tab  1 Tab Oral Q6H PRN    0.9% sodium chloride infusion 250 mL  250 mL IntraVENous PRN    lidocaine (XYLOCAINE) 5 % ointment   Topical PRN    lidocaine (XYLOCAINE) 2 % jelly   Other QID PRN    vancomycin 50 mg/mL oral solution (compounded) 250 mg  250 mg Oral Q6H    hydrocortisone (ANUSOL-HC) suppository 25 mg  25 mg Rectal Q12H PRN    pantoprazole (PROTONIX) tablet 40 mg  40 mg Oral ACB    calcium carbonate (TUMS) chewable tablet 200 mg [elemental]  200 mg Oral TID PRN    alum-mag hydroxide-simeth (MYLANTA) oral suspension 30 mL  30 mL Oral Q4H PRN    mesalamine (LIALDA) delayed release tablet 2,400 mg- PATIENT SUPPLIED  2.4 g Oral BID WITH MEALS    folic acid (FOLVITE) tablet 1 mg  1 mg Oral DAILY    promethazine (PHENERGAN) tablet 12.5 mg  12.5 mg Oral Q6H PRN    sodium chloride (NS) flush 5-10 mL  5-10 mL IntraVENous Q8H    sodium chloride (NS) flush 5-10 mL  5-10 mL IntraVENous PRN    ondansetron (ZOFRAN) injection 4 mg  4 mg IntraVENous Q4H PRN    zolpidem (AMBIEN) tablet 5 mg  5 mg Oral QHS PRN    0.9% sodium chloride with KCl 20 mEq/L infusion   IntraVENous CONTINUOUS     ALLERGIES:  Ibuprofen and Sulfa (sulfonamide antibiotics)    Social History     Social History    Marital status:      Spouse name: N/A    Number of children: N/A    Years of education: N/A     Social History Main Topics    Smoking status: Never Smoker    Smokeless tobacco: None    Alcohol use Yes    Drug use: No    Sexual activity: Not Asked     Other Topics Concern    None     Social History Narrative     History   Smoking Status    Never Smoker   Smokeless Tobacco    Not on file     History reviewed. No pertinent family history. ROS: The patient has no difficulty with chest pain or shortness of breath. No fever or chills. Comprehensive review of systems was otherwise unremarkable except as noted above. Physical Exam:   Constitutional: Alert oriented cooperative patient in no acute distress. Visit Vitals    /66 (BP 1 Location: Right arm, BP Patient Position: At rest)    Pulse 61    Temp 98.1 °F (36.7 °C)    Resp 14    Ht 5' 9\" (1.753 m)    Wt 207 lb 3.2 oz (94 kg)    SpO2 98%    BMI 30.6 kg/m2     Eyes:Sclera are clear. ENMT: no obvious neck masses, no ear or lip lesions  CV: RRR. Normal perfusion  Resp: No JVD. Breathing is  non-labored. GI: soft and non-distended   Multiple large, prolapsed circumferential hemorrhoids; softer than on 2/4/17    Musculoskeletal: unremarkable with normal function. Neuro:  No obvious focal deficits  Psychiatric: normal affect and mood, no memory impairment      Labs:  Lab Results   Component Value Date/Time    WBC 12.4 02/03/2017 06:10 AM    PLATELET 952 73/28/1411 06:10 AM    Sodium 145 02/03/2017 06:10 AM    Potassium 4.1 02/03/2017 06:10 AM    Chloride 108 02/03/2017 06:10 AM    CO2 29 02/03/2017 06:10 AM    BUN 3 02/03/2017 06:10 AM    Creatinine 0.50 02/03/2017 06:10 AM    Glucose 114 02/03/2017 06:10 AM    INR 1.2 01/18/2017 02:00 PM    Bilirubin, total 0.2 01/21/2017 07:40 AM    AST (SGOT) 7 01/21/2017 07:40 AM    ALT (SGPT) 9 01/21/2017 07:40 AM    Alk. phosphatase 55 01/21/2017 07:40 AM    Lipase 60 01/12/2017 10:45 AM       No results found for this or any previous visit.       Assessment/Plan:     )Shaunna Oliveros is a 40 y.o. male who has signs and symptoms consistent with the below issues:  Problem List  Date Reviewed: 1/21/2017          Codes Class Noted    Prolapsed hemorrhoids ICD-10-CM: K64.8  ICD-9-CM: 455.8  2/4/2017        Current chronic use of systemic steroids ICD-10-CM: Z79.52  ICD-9-CM: V58.65  2/4/2017        Acute blood loss anemia ICD-10-CM: D62  ICD-9-CM: 285.1  1/18/2017        * (Principal)Ulcerative colitis ([facility] ICD-10-CM: K51.90  ICD-9-CM: 556.9  1/18/2017        Clostridium difficile diarrhea ICD-10-CM: A04.7  ICD-9-CM: 008.45  1/18/2017        Rectal bleeding ICD-10-CM: K62.5  ICD-9-CM: 569.3  1/12/2017        Colitis, ulcerative chronic ([facility] ICD-10-CM: K51.90  ICD-9-CM: 556.9  1/12/2017        Fever ICD-10-CM: R50.9  ICD-9-CM: 780.60  1/12/2017        Ulcerative colitis, chronic ([facility] ICD-10-CM: K51.90  ICD-9-CM: 556.9  1/12/2017        UC (ulcerative colitis) ([facility] ICD-10-CM: K51.90  ICD-9-CM: 556.9  12/1/2016        Rectal bleed ICD-10-CM: K62.5  ICD-9-CM: 569.3  12/1/2016        Diarrhea ICD-10-CM: R19.7  ICD-9-CM: 787.91  12/1/2016        Abdominal pain ICD-10-CM: R10.9  ICD-9-CM: 789.00  12/1/2016              Copious applications of 8.0% hydrocortisone the perianal hemorrhoids. These are swollen, prolapsed, and edematous but not thrombosed at this point.   Add warm Sitz baths (in whirpool)    I will watch with you and offer I&D if he develops thrombosis  Ideally would be able to manage this non-operatively given the Remicade/Prednisone/Immune suppression   to lower risk of a post-op wound sepsis      Chloé Monroe MD,  FACS

## 2017-02-06 VITALS
HEIGHT: 69 IN | TEMPERATURE: 97.5 F | BODY MASS INDEX: 30.69 KG/M2 | HEART RATE: 78 BPM | DIASTOLIC BLOOD PRESSURE: 63 MMHG | RESPIRATION RATE: 18 BRPM | OXYGEN SATURATION: 98 % | SYSTOLIC BLOOD PRESSURE: 103 MMHG | WEIGHT: 207.2 LBS

## 2017-02-06 LAB
ERYTHROCYTE [DISTWIDTH] IN BLOOD BY AUTOMATED COUNT: 19.5 % (ref 11.9–14.6)
HCT VFR BLD AUTO: 28.2 % (ref 41.1–50.3)
HGB BLD-MCNC: 8.3 G/DL (ref 13.6–17.2)
MCH RBC QN AUTO: 24.3 PG (ref 26.1–32.9)
MCHC RBC AUTO-ENTMCNC: 29.4 G/DL (ref 31.4–35)
MCV RBC AUTO: 82.7 FL (ref 79.6–97.8)
PLATELET # BLD AUTO: 492 K/UL (ref 150–450)
PMV BLD AUTO: 8.9 FL (ref 10.8–14.1)
RBC # BLD AUTO: 3.41 M/UL (ref 4.23–5.67)
WBC # BLD AUTO: 12.4 K/UL (ref 4.3–11.1)

## 2017-02-06 PROCEDURE — 85027 COMPLETE CBC AUTOMATED: CPT | Performed by: INTERNAL MEDICINE

## 2017-02-06 PROCEDURE — 74011250637 HC RX REV CODE- 250/637: Performed by: PHYSICIAN ASSISTANT

## 2017-02-06 PROCEDURE — 97161 PT EVAL LOW COMPLEX 20 MIN: CPT

## 2017-02-06 PROCEDURE — 74011636637 HC RX REV CODE- 636/637: Performed by: INTERNAL MEDICINE

## 2017-02-06 PROCEDURE — 36415 COLL VENOUS BLD VENIPUNCTURE: CPT | Performed by: INTERNAL MEDICINE

## 2017-02-06 PROCEDURE — 97597 DBRDMT OPN WND 1ST 20 CM/<: CPT

## 2017-02-06 PROCEDURE — 74011250636 HC RX REV CODE- 250/636: Performed by: INTERNAL MEDICINE

## 2017-02-06 PROCEDURE — 94762 N-INVAS EAR/PLS OXIMTRY CONT: CPT

## 2017-02-06 PROCEDURE — 74011250637 HC RX REV CODE- 250/637: Performed by: INTERNAL MEDICINE

## 2017-02-06 PROCEDURE — 74011250637 HC RX REV CODE- 250/637: Performed by: NURSE PRACTITIONER

## 2017-02-06 RX ORDER — PREDNISONE 10 MG/1
TABLET ORAL
Qty: 70 TAB | Refills: 0 | Status: SHIPPED | OUTPATIENT
Start: 2017-02-06 | End: 2018-07-17

## 2017-02-06 RX ORDER — HYDROCORTISONE 25 MG/G
1 CREAM TOPICAL AS NEEDED
Qty: 30 G | Refills: 2 | Status: SHIPPED | OUTPATIENT
Start: 2017-02-06 | End: 2018-07-17

## 2017-02-06 RX ORDER — HYDROCORTISONE ACETATE 25 MG/1
25 SUPPOSITORY RECTAL EVERY 12 HOURS
Qty: 14 SUPPOSITORY | Refills: 2 | Status: SHIPPED | OUTPATIENT
Start: 2017-02-06 | End: 2018-07-17

## 2017-02-06 RX ADMIN — PREDNISONE 40 MG: 20 TABLET ORAL at 08:13

## 2017-02-06 RX ADMIN — HYDROCORTISONE 2.5%: 25 CREAM TOPICAL at 10:00

## 2017-02-06 RX ADMIN — VANCOMYCIN HYDROCHLORIDE 250 MG: 1 INJECTION, POWDER, LYOPHILIZED, FOR SOLUTION INTRAVENOUS at 05:57

## 2017-02-06 RX ADMIN — HYDROCORTISONE 2.5%: 25 CREAM TOPICAL at 12:00

## 2017-02-06 RX ADMIN — VANCOMYCIN HYDROCHLORIDE 250 MG: 1 INJECTION, POWDER, LYOPHILIZED, FOR SOLUTION INTRAVENOUS at 11:46

## 2017-02-06 RX ADMIN — HYDROCORTISONE 2.5%: 25 CREAM TOPICAL at 08:00

## 2017-02-06 RX ADMIN — DICYCLOMINE HYDROCHLORIDE 10 MG: 10 CAPSULE ORAL at 15:13

## 2017-02-06 RX ADMIN — DICYCLOMINE HYDROCHLORIDE 10 MG: 10 CAPSULE ORAL at 08:13

## 2017-02-06 RX ADMIN — SODIUM CHLORIDE AND POTASSIUM CHLORIDE: 9; 1.49 INJECTION, SOLUTION INTRAVENOUS at 08:12

## 2017-02-06 RX ADMIN — MESALAMINE 2400 MG: 1.2 TABLET, DELAYED RELEASE ORAL at 08:15

## 2017-02-06 RX ADMIN — HYDROCORTISONE 2.5%: 25 CREAM TOPICAL at 18:00

## 2017-02-06 RX ADMIN — FOLIC ACID 1 MG: 1 TABLET ORAL at 08:13

## 2017-02-06 RX ADMIN — HYDROCORTISONE 2.5%: 25 CREAM TOPICAL at 06:00

## 2017-02-06 RX ADMIN — HYDROMORPHONE HYDROCHLORIDE 2 MG: 1 INJECTION, SOLUTION INTRAMUSCULAR; INTRAVENOUS; SUBCUTANEOUS at 04:14

## 2017-02-06 RX ADMIN — HYDROCODONE BITARTRATE AND ACETAMINOPHEN 1 TABLET: 10; 325 TABLET ORAL at 17:54

## 2017-02-06 RX ADMIN — MESALAMINE 2400 MG: 1.2 TABLET, DELAYED RELEASE ORAL at 17:56

## 2017-02-06 RX ADMIN — PANTOPRAZOLE SODIUM 40 MG: 40 TABLET, DELAYED RELEASE ORAL at 05:57

## 2017-02-06 RX ADMIN — HYDROCORTISONE 2.5%: 25 CREAM TOPICAL at 16:00

## 2017-02-06 RX ADMIN — HYDROCORTISONE 2.5%: 25 CREAM TOPICAL at 14:00

## 2017-02-06 RX ADMIN — Medication 10 ML: at 06:00

## 2017-02-06 RX ADMIN — VANCOMYCIN HYDROCHLORIDE 250 MG: 1 INJECTION, POWDER, LYOPHILIZED, FOR SOLUTION INTRAVENOUS at 17:55

## 2017-02-06 RX ADMIN — HYDROMORPHONE HYDROCHLORIDE 2 MG: 1 INJECTION, SOLUTION INTRAMUSCULAR; INTRAVENOUS; SUBCUTANEOUS at 09:09

## 2017-02-06 NOTE — PROGRESS NOTES
Problem: Mobility Impaired (Adult and Pediatric)  Goal: *Acute Goals and Plan of Care (Insert Text)  1. Mr. Geoff Kyle will report 0/10 pain in affected area within 7 days. WOUNDCARE ASSESSMENT  [X]  Initial Assessment            [ ]  7th Visit                   [ ]  Recertification        [ ]  Discharge     NAME/AGE/GENDER: Valerie Mortimer is a 40 y.o. male  DATE: 2/6/2017  PRIMARY DIAGNOSIS: Rectal bleeding [K62.5]  Clostridium difficile diarrhea [A04.7]  Diarrhea, unspecified type [R19.7]  Ulcerative colitis with complication, unspecified location ([address] [K51.919]  Clostridium difficile colitis [A04.7]  H/O Clostridium difficile infection [Z86.19] Ulcerative colitis ([address]  Procedure(s) (LRB):  SIGMOIDOSCOPY FLEXIBLE (N/A)  COLON BIOPSY (N/A) 7 Days Post-Op      History of Present Illness: is a 40year old male admitted from home with ulcerative colitis and cdiff. Has a history of hemorrhoids which have prolapsed since admission.        Past Medical History   Diagnosis Date    Ulcerative colitis [facility])        Past Surgical History   Procedure Laterality Date    Flexible sigmoidoscopy N/A 12/5/2016       SIGMOIDOSCOPY FLEXIBLE performed by Fco Scott MD at [facility] ENDOSCOPY    Flexible sigmoidoscopy N/A 1/20/2017       SIGMOIDOSCOPY FLEXIBLE performed by Triston Syed MD at [facility] ENDOSCOPY    Flexible sigmoidoscopy N/A 1/26/2017       SIGMOIDOSCOPY FLEXIBLE/ 31/ 636 performed by Alma Delia Gar MD at [facility] ENDOSCOPY    Flexible sigmoidoscopy N/A 1/30/2017       SIGMOIDOSCOPY FLEXIBLE performed by Gemma Tarango MD at [facility] ENDOSCOPY      Patient Active Problem List   Diagnosis Code    UC (ulcerative colitis) ([address] K51.90    Rectal bleed K62.5    Diarrhea R19.7    Abdominal pain R10.9    Rectal bleeding K62.5    Colitis, ulcerative chronic ([address] K51.90    Fever R50.9    Ulcerative colitis, chronic ([address] K51.90    Acute blood loss anemia D62    Ulcerative colitis ([address] K51.90    Clostridium difficile diarrhea A04.7    Prolapsed hemorrhoids K64.8    Current chronic use of systemic steroids Z79.52        Prior Level of Function/Home Situation: independent, active, and works at [address]: Private residence  # Steps to Enter: 1  One/Two Story Residence: Two story  Living Alone: No  Support Systems: Spouse/Significant Other/Partner  Patient Expects to be Discharged to[de-identified] Private residence  Current DME Used/Available at Home: None     Interdisciplinary Collaboration: Physical Therapist, Registered Nurse and Rehabilitation Attendant     SUBJECTIVE:  Patient stated that feels better. OBJECTIVE: pt performs transfers to/from bed, wheelchair, and lift chair/whirlpool independently. Tolerated warm whirlpool for 10 minutes with no increase pain pain from beginning of assessment. Returned to room via wheelchair and left up in bathroom.    Pain Intensity 1: 0Pain Location 1: Other (comment) (rectal)Pain Orientation 1: LowerPain Intervention(s) 1: Medication (see MAR), Family Support, Rest                                                                    Bed Mobility  Rolling: Independent  Supine to Sit: Independent  Sit to Supine: Independent  Scooting: Independent      Transfers  Sit to Stand: Independent  Stand to Sit: Independent     Braces/Orthotics: none     Balance  Sitting: Intact  Standing: Intact                  Ambulation - Level of Assistance: Independent         Surface:level tile               Neuromuscular re-education:                  Physical Therapy Evaluation Charge Determination   History Examination Presentation Decision-Making   1-2: MODERATE COMPLEXITY 3: MODERATE COMPLEXITY Stable and uncomplicated: LOW COMPLEXITY Clear prediction of patient's progress: LOW COMPLEXITY         WOUND ASSESSMENT:   Skin Integumentary  Skin Color: Appropriate for ethnicity  Skin Condition/Temp: Warm, Dry  Skin Integrity: Intact  Turgor: Non-tenting  Hair Growth: Present                                                                                                                                                                             Treatment Times: eval only              Therapeutic Exercise: 0 minutes              Gait Trainin minutes              Therapeutic Activity: 0 minutes              Neuromuscular Re-education: 0 minutes       Safety:  Pt left up in bathroom afterwards taking shower  After treatment precautions: [ ] Bed            [ ] Yareli Sinai        [ ] Chair     [ ] Essentials within Reach    [ ] Restraint in place      [ ] Caregiver present  [ ] RN notified    [ ] Bed Alarm/Tab Alert applied              ASSESSMENT:  Patient would benefit from skilled Physical Therapy intervention to decrease pain from prolapsed hemorrhoids. PROBLEM LIST:  [ ] Decreased [place] with Bed Mobility    [ ] Decreased Strength  [ ] Decreased [place] with Transfers        [ ] Decreased Range of Motion  [ ] Decreased [place] with Gait                 [ ] Decreased Balance  [ ] Decreased [place] with Precautions    [ ] Decreased [place] with HEP  [ ] Decreased [place] with Stairs              [ ] Other (comment):     PLAN OF CARE:  INTERVENTIONS PLANNED:  [ ] Bed Mobility Training                     [ ] Patient Education  [ ] Transfer Training                            [ ] Modalities  [ ] Gait Training                                   [ ] Family Training/Education  [ ] Therapeutic Exercises                    [ ] Therapeutic Activities  [ ] Neuromuscular Re-education        [ ] Wound care including wound debridement and dressing changes as needed  [X] Other (comment): whirlpool     Frequency/Duration:  Continue to follow patient daily for  duration of hospital stay to address above goals. REHABILITATION POTENTIAL FOR STATED GOALS:  Good   Benefits and precautions of physical therapy have been discussed with the patient. · Patients response to todays session was: tolerated well with no complications. · Compliance with program/exercises: compliant all of the time.   · Recommended level of rehabilitation at time of discharge (pending progress): None  · Other Comments/Recommendations/DME: none     PT Patient Time In/Time Out  Time In: 1020  Time Out: 1100     Thank you for this referral.  BRAYDON MccartneyT

## 2017-02-06 NOTE — PROGRESS NOTES
[facility]  [address] [place] [address], [address] [facility]  [phone]    Office Note/H&P/Consult Note   Tamanna Recinos   MRN: [id number]     : 1970        HPI: Tamanna Recinos is a 40 y.o. male who we were consulted to see regarding large, painful prolapsed hemorrhoidal disease in the setting of rectal bleeding and U.C on immune suppression. He has a PMHx of Ulcerative Colitis, C.diff colitis and was admitted for drop in HGB to 7.0 with hypotension and bloody stools. He had lower abdominal pain with increase in blood per rectum and began feeling \"faint\". He fell at home and his wife was concerned and checked his BP which was in the 27'A systolic and his temp was 39C. She called 911. On arrival, he was hypotensive with BP 91/54, Temp 37.1C. He has received NS bolus. BP has improved to 109/57. HGB is 7.0 which is a drop from 8.4 yesterday. He has agreed to transfusion. WBC is up to 26.8 from 12.7. He reports lower abdominal cramping pain. Reports increase in number of stools overnight with increasing amount of blood in stool. Potassium is 3.0 on arrival and he has been replaced orally. He has been admitted and treated by GI. He is on Remicade and Prednisone.   he has developed painful, prolapsed hemorrhoids while hospitalized. The pain is severe at time as and without radiation. Nothing seems to make it better. It is worse with pressure or palpation and he has had multiple BMs per day which makes the pain-issues worse. He has no recent associated fever.       Past Medical History   Diagnosis Date    Ulcerative colitis [facility])      Past Surgical History   Procedure Laterality Date    Flexible sigmoidoscopy N/A 2016     SIGMOIDOSCOPY FLEXIBLE performed by Latha Abbasi MD at [facility] ENDOSCOPY    Flexible sigmoidoscopy N/A 2017     SIGMOIDOSCOPY FLEXIBLE performed by Nasrin aCrver MD at [facility] ENDOSCOPY    Flexible sigmoidoscopy N/A 2017 SIGMOIDOSCOPY FLEXIBLE/ 31/ 636 performed by Leann Casiano MD at [facility] ENDOSCOPY    Flexible sigmoidoscopy N/A 1/30/2017     SIGMOIDOSCOPY FLEXIBLE performed by Lindsay Keita MD at [facility] ENDOSCOPY     Current Facility-Administered Medications   Medication Dose Route Frequency    dicyclomine (BENTYL) capsule 10 mg  10 mg Oral QID    HYDROmorphone (PF) (DILAUDID) injection 2 mg  2 mg IntraVENous Q4H PRN    hydrocortisone (ANUSOL-HC) 2.5 % rectal cream   PeriANAL Q2HWA    predniSONE (DELTASONE) tablet 40 mg  40 mg Oral DAILY WITH BREAKFAST    HYDROcodone-acetaminophen (NORCO)  mg tablet 1 Tab  1 Tab Oral Q6H PRN    0.9% sodium chloride infusion 250 mL  250 mL IntraVENous PRN    vancomycin 50 mg/mL oral solution (compounded) 250 mg  250 mg Oral Q6H    hydrocortisone (ANUSOL-HC) suppository 25 mg  25 mg Rectal Q12H PRN    pantoprazole (PROTONIX) tablet 40 mg  40 mg Oral ACB    calcium carbonate (TUMS) chewable tablet 200 mg [elemental]  200 mg Oral TID PRN    alum-mag hydroxide-simeth (MYLANTA) oral suspension 30 mL  30 mL Oral Q4H PRN    mesalamine (LIALDA) delayed release tablet 2,400 mg- PATIENT SUPPLIED  2.4 g Oral BID WITH MEALS    folic acid (FOLVITE) tablet 1 mg  1 mg Oral DAILY    promethazine (PHENERGAN) tablet 12.5 mg  12.5 mg Oral Q6H PRN    sodium chloride (NS) flush 5-10 mL  5-10 mL IntraVENous Q8H    sodium chloride (NS) flush 5-10 mL  5-10 mL IntraVENous PRN    ondansetron (ZOFRAN) injection 4 mg  4 mg IntraVENous Q4H PRN    zolpidem (AMBIEN) tablet 5 mg  5 mg Oral QHS PRN    0.9% sodium chloride with KCl 20 mEq/L infusion   IntraVENous CONTINUOUS     ALLERGIES:  Ibuprofen and Sulfa (sulfonamide antibiotics)    Social History     Social History    Marital status:      Spouse name: N/A    Number of children: N/A    Years of education: N/A     Social History Main Topics    Smoking status: Never Smoker    Smokeless tobacco: None    Alcohol use Yes    Drug use: No    Sexual activity: Not Asked     Other Topics Concern    None     Social History Narrative     History   Smoking Status    Never Smoker   Smokeless Tobacco    Not on file     History reviewed. No pertinent family history. ROS: The patient has no difficulty with chest pain or shortness of breath. No fever or chills. Comprehensive review of systems was otherwise unremarkable except as noted above. Physical Exam:   Constitutional: Alert oriented cooperative patient in no acute distress. Visit Vitals    /56    Pulse 64    Temp 97.9 °F (36.6 °C)    Resp 17    Ht 5' 9\" (1.753 m)    Wt 207 lb 3.2 oz (94 kg)    SpO2 95%    BMI 30.6 kg/m2     Eyes:Sclera are clear. ENMT: no obvious neck masses, no ear or lip lesions  CV: RRR. Normal perfusion  Resp: No JVD. Breathing is  non-labored. GI: soft and non-distended   Multiple large, prolapsed circumferential hemorrhoids; softer than on 2/4/17    Musculoskeletal: unremarkable with normal function. Neuro:  No obvious focal deficits  Psychiatric: normal affect and mood, no memory impairment      Labs:  Lab Results   Component Value Date/Time    WBC 12.4 02/06/2017 06:53 AM    PLATELET 372 47/03/3453 06:53 AM    Sodium 145 02/03/2017 06:10 AM    Potassium 4.1 02/03/2017 06:10 AM    Chloride 108 02/03/2017 06:10 AM    CO2 29 02/03/2017 06:10 AM    BUN 3 02/03/2017 06:10 AM    Creatinine 0.50 02/03/2017 06:10 AM    Glucose 114 02/03/2017 06:10 AM    INR 1.2 01/18/2017 02:00 PM    Bilirubin, total 0.2 01/21/2017 07:40 AM    AST (SGOT) 7 01/21/2017 07:40 AM    ALT (SGPT) 9 01/21/2017 07:40 AM    Alk. phosphatase 55 01/21/2017 07:40 AM    Lipase 60 01/12/2017 10:45 AM       No results found for this or any previous visit.       Assessment/Plan:     )Elmer Poole is a 40 y.o. male who has signs and symptoms consistent with the below issues:  Problem List  Date Reviewed: 1/21/2017          Codes Class Noted    Prolapsed hemorrhoids ICD-10-CM: K64.8  ICD-9-CM: 455.8  2/4/2017        Current chronic use of systemic steroids ICD-10-CM: Z79.52  ICD-9-CM: V58.65  2/4/2017        Acute blood loss anemia ICD-10-CM: D62  ICD-9-CM: 285.1  1/18/2017        * (Principal)Ulcerative colitis ([facility] ICD-10-CM: K51.90  ICD-9-CM: 556.9  1/18/2017        Clostridium difficile diarrhea ICD-10-CM: A04.7  ICD-9-CM: 008.45  1/18/2017        Rectal bleeding ICD-10-CM: K62.5  ICD-9-CM: 569.3  1/12/2017        Colitis, ulcerative chronic ([facility] ICD-10-CM: K51.90  ICD-9-CM: 556.9  1/12/2017        Fever ICD-10-CM: R50.9  ICD-9-CM: 780.60  1/12/2017        Ulcerative colitis, chronic ([facility] ICD-10-CM: K51.90  ICD-9-CM: 556.9  1/12/2017        UC (ulcerative colitis) ([facility] ICD-10-CM: K51.90  ICD-9-CM: 556.9  12/1/2016        Rectal bleed ICD-10-CM: K62.5  ICD-9-CM: 569.3  12/1/2016        Diarrhea ICD-10-CM: R19.7  ICD-9-CM: 787.91  12/1/2016        Abdominal pain ICD-10-CM: R10.9  ICD-9-CM: 789.00  12/1/2016              Copious applications of 0.4% hydrocortisone the perianal hemorrhoids. These are swollen, prolapsed, and edematous but not thrombosed at this point. Add warm Sitz baths (in whirpool). I think this will work. He is already feeling better.      I will watch with you and offer I&D if he develops thrombosis  Ideally would be able to manage this non-operatively given the Remicade/Prednisone/Immune suppression   to lower risk of a post-op wound sepsis      Shayla Naik MD,  FACS

## 2017-02-06 NOTE — PROGRESS NOTES
Discharge instructions and prescriptions given and reviewed with pt, verbalizes understanding, medication side effect sheet reviewed with pt, pt requesting Norco for pain rather than Tramadol, notified Dr. Mone Hendricks, he will change meds, pt will not have a ride available until 8 pm tonight.

## 2017-02-06 NOTE — PROGRESS NOTES
GI DAILY PROGRESS NOTE    Admit Date:  1/18/2017    Today's Date:  2/6/2017    CC:  UC/c.diff    Subjective:     Patient is improved since Remicaid Friday and is having semi-formed stools, no BRBPR but continued pain. On cream per surgery, is helping. Medications:   Current Facility-Administered Medications   Medication Dose Route Frequency    dicyclomine (BENTYL) capsule 10 mg  10 mg Oral QID    HYDROmorphone (PF) (DILAUDID) injection 2 mg  2 mg IntraVENous Q4H PRN    hydrocortisone (ANUSOL-HC) 2.5 % rectal cream   PeriANAL Q2HWA    predniSONE (DELTASONE) tablet 40 mg  40 mg Oral DAILY WITH BREAKFAST    HYDROcodone-acetaminophen (NORCO)  mg tablet 1 Tab  1 Tab Oral Q6H PRN    0.9% sodium chloride infusion 250 mL  250 mL IntraVENous PRN    vancomycin 50 mg/mL oral solution (compounded) 250 mg  250 mg Oral Q6H    hydrocortisone (ANUSOL-HC) suppository 25 mg  25 mg Rectal Q12H PRN    pantoprazole (PROTONIX) tablet 40 mg  40 mg Oral ACB    calcium carbonate (TUMS) chewable tablet 200 mg [elemental]  200 mg Oral TID PRN    alum-mag hydroxide-simeth (MYLANTA) oral suspension 30 mL  30 mL Oral Q4H PRN    mesalamine (LIALDA) delayed release tablet 2,400 mg- PATIENT SUPPLIED  2.4 g Oral BID WITH MEALS    folic acid (FOLVITE) tablet 1 mg  1 mg Oral DAILY    promethazine (PHENERGAN) tablet 12.5 mg  12.5 mg Oral Q6H PRN    sodium chloride (NS) flush 5-10 mL  5-10 mL IntraVENous Q8H    sodium chloride (NS) flush 5-10 mL  5-10 mL IntraVENous PRN    ondansetron (ZOFRAN) injection 4 mg  4 mg IntraVENous Q4H PRN    zolpidem (AMBIEN) tablet 5 mg  5 mg Oral QHS PRN    0.9% sodium chloride with KCl 20 mEq/L infusion   IntraVENous CONTINUOUS       Review of Systems:  A review of system was obtained, with pertinent positives as listed above. All others are negative.     Objective:   Vitals:  Visit Vitals    /56    Pulse 64    Temp 97.9 °F (36.6 °C)    Resp 17    Ht 5' 9\" (1.753 m)    Wt 94 kg (207 lb 3.2 oz)    SpO2 95%    BMI 30.6 kg/m2     Intake/Output:     02/04 1901 - 02/06 0700  In: 4696 [I.V.:2789]  Out: -   Exam:  General appearance: alert, cooperative, no distress  Lungs: clear to auscultation bilaterally anteriorly  Heart: regular rate and rhythm  Abdomen: soft, non-tender. Bowel sounds normal. No masses,  no organomegaly  Extremities: extremities normal, atraumatic, no cyanosis or edema  Neuro:  Alert and oriented without obvious neurological deficits. Data Review (Labs):    Recent Labs      02/06/17   0653   WBC  12.4*   HGB  8.3*   HCT  28.2*   PLT  492*   MCV  82.7       Assessment:     Principal Problem:    Ulcerative colitis (HCC) (1/18/2017)  LIALDA, 40MG PRED, S/P 3X REMICADE - IMPROVING    Active Problems:    Rectal bleed (12/1/2016) HEMORRHOIDAL AND FOLLOWEDD BY DR. Quach Pale      Abdominal pain (12/1/2016) IMPROVEWD      Fever (1/12/2017)      Acute blood loss anemia (1/18/2017)      Clostridium difficile diarrhea (1/18/2017)      Prolapsed hemorrhoids (2/4/2017)      Current chronic use of systemic steroids (2/4/2017)        Plan:     DC IV AND DISCHARGE ON CURRENT PLAN.

## 2017-02-06 NOTE — DISCHARGE SUMMARY
[facility] Discharge Summary      Patient ID:  Isaac Garcia  [id number]  40 y.o.  1970    Admit date:  1/18/2017    Discharge date and time:  2/6/2017     Primary GI Physician: Dr Paige Salazar Physician:  Kearney Primrose, MD     Discharge Physician: Dr Bela Morel    Admission Diagnoses:  Rectal bleeding [K62.5]  Clostridium difficile diarrhea [A04.7]  Diarrhea, unspecified type [R19.7]  Ulcerative colitis with complication, unspecified location [facility]) [K51.919]  Clostridium difficile colitis [A04.7]  H/O Clostridium difficile infection [Z86.19]    Discharge Diagnoses:  Principal Problem:    Ulcerative colitis ([address] (1/18/2017)    Active Problems:    Rectal bleed (12/1/2016)      Abdominal pain (12/1/2016)      Fever (1/12/2017)      Acute blood loss anemia (1/18/2017)      Clostridium difficile diarrhea (1/18/2017)      Prolapsed hemorrhoids (2/4/2017)      Current chronic use of systemic steroids (2/4/2017)         Consults:    Surgery consulted to evaluate hemorrhoids, large, painful and bleeding     Significant Diagnostic Studies:  Recent Labs      02/06/17   0653   WBC  12.4*   HGB  8.3*   HCT  28.2*   PLT  492*   MCV  82.7        Hospital Course:   40 y.o. male with PMH of Ulcerative Colitis, C.diff admitted for drop in HGB to 7.0 with hypotension and bloody stools on 1/18/17. He was admitted from 1/12/17 until 1/17/17 with C.diff and Ulcerative Colitis. He was doing well with fewer stools and no fever with no abdominal pain. He was discharged home and then returned with lower abdominal pain with increase in blood per rectum and began feeling \"faint\". He fell at home and his wife was concerned and checked his BP which was in the 86'M systolic and his temp was 39C. She called 911. On arrival, he was hypotensive with BP 91/54, Temp 37.1C. He received NS bolus. BP has improved to 109/57. HGB is 7.0 which is a drop from 8.4 yesterday. He has agreed to transfusion.  WBC is up to 26.8 from 12.7. C Diff was positive on 1/12/17 and he was initially treated with Flagyl but had inadeqite response. This was changed to oral Vancomycin 1/19/17. Diarrhea improved but abd pain continued. [place] 1/30/17 as below with findings consistent with active UC. Remicaide was initiated 2/3/17 and stools are more formed without blood. He continues to have rectal pain due to hemorrhoids. Surgery was consulted and topical therapy was recommended. He will be discharged on steroid taper, topical rectal sterios and cream.  He will be given Tramadol for rectal pain. COLO 1/30/17 Dr Fregoso Getting: Although apparent pseudomembranes are present, the underlying mucosa appears to be most consistent with severe ulcerative colitis. Addendum: The patient has large external hemorrhoids. One of these is dark purple and tender despite sedation, suggesting thrombosis. I will add topical lidocaine.     Objective:   Vitals:  Visit Vitals    /63    Pulse 78    Temp 97.5 °F (36.4 °C)    Resp 18    Ht 5' 9\" (1.753 m)    Wt 94 kg (207 lb 3.2 oz)    SpO2 98%    BMI 30.6 kg/m2     Intake/Output:  02/06 0701 - 02/06 1900  In: 240 [P.O.:240]  Out: -   02/04 1901 - 02/06 0700  In: 4297 [I.V.:2789]  Out: -   Exam:  General appearance: alert, cooperative, no distress  Lungs: clear to auscultation bilaterally anteriorly  Heart: regular rate and rhythm  Abdomen: soft, non-tender. Bowel sounds normal. No masses, no organomegaly  Extremities: extremities normal, atraumatic, no cyanosis or edema  Neuro:  alert and oriented    Disposition:  Home w office follow up    Diet:  As tolerated    Activity:  As tolerated    Patient Instructions:   Current Discharge Medication List      START taking these medications    Details   hydrocortisone (ANUSOL-HC) 2.5 % rectal cream Insert 1 Tube into rectum as needed for Hemorrhoids.   Qty: 30 g, Refills: 2         CONTINUE these medications which have CHANGED    Details hydrocortisone (ANUSOL-HC) 25 mg supp Insert 1 Suppository into rectum every twelve (12) hours. Qty: 14 Suppository, Refills: 2      predniSONE (DELTASONE) 10 mg tablet Take 4 tabs PO daily for 7 days, then 3 tabs PO for 7 days, then 2 tabs PO for 7 days, then 1 tab PO for 7 days. Then stop. Indications: Ulcerative Colitis  Qty: 70 Tab, Refills: 0         CONTINUE these medications which have NOT CHANGED    Details   IRON FM,PS NO.1/FOLIC/MV EK.42 (SE-TAN PLUS PO) Take 1 Tab by mouth daily. pantoprazole (PROTONIX) 40 mg tablet Take 40 mg by mouth daily. dicyclomine (BENTYL) 10 mg capsule Take 10 mg by mouth four (4) times daily. ondansetron hcl (ZOFRAN, AS HYDROCHLORIDE,) 4 mg tablet Take 4 mg by mouth every four (4) hours as needed for Nausea. traMADol (ULTRAM) 50 mg tablet Take 50 mg by mouth every six (6) hours as needed for Pain.      mesalamine (LIALDA) 1.2 gram delayed release tablet Take 2.4 g by mouth ACB/HS. folic acid (FOLVITE) 1 mg tablet Take 1 mg by mouth daily. HYDROcodone-acetaminophen (NORCO) 5-325 mg per tablet Take 1 Tab by mouth every six (6) hours as needed for Pain. INFLIXIMAB (REMICADE IV) by IntraVENous route. promethazine (PHENERGAN) 25 mg tablet Take 1 Tab by mouth every eight (8) hours as needed. Qty: 15 Tab, Refills: 0         STOP taking these medications       metroNIDAZOLE (FLAGYL) 500 mg tablet Comments:   Reason for Stopping: Follow up: Follow-up Appointments   Procedures    FOLLOW UP VISIT Appointment in: One Month Our office will call w follow up appt     Our office will call w follow up appt     Standing Status:   Standing     Number of Occurrences:   1     Order Specific Question:   Appointment in     Answer:   One Month       Total time discharging patient took greater than 30 minutes. Signed:  Adriana Watson NP  2/6/2017  1:38 PM  ATTENDING NOTE:  I have seen and examined the patient along with my NP/PA.  The assessment and plan above is my own. He requested Norco, #30 of 5-325 given.    Tristian Plummer MD

## 2018-06-07 ENCOUNTER — HOSPITAL ENCOUNTER (OUTPATIENT)
Dept: LAB | Age: 48
Discharge: HOME | End: 2018-06-07

## 2018-06-07 PROCEDURE — 88305 TISSUE EXAM BY PATHOLOGIST: CPT | Performed by: INTERNAL MEDICINE

## 2018-07-17 ENCOUNTER — HOSPITAL ENCOUNTER (OUTPATIENT)
Dept: LAB | Age: 48
Discharge: HOME | End: 2018-07-17
Payer: COMMERCIAL

## 2018-07-17 DIAGNOSIS — I44.1 MOBITZ TYPE 1 SECOND DEGREE ATRIOVENTRICULAR BLOCK: ICD-10-CM

## 2018-07-17 PROBLEM — Z82.49 FAMILY HISTORY OF CORONARY ARTERY DISEASE: Status: ACTIVE | Noted: 2018-07-17

## 2018-07-17 LAB
T4 FREE SERPL-MCNC: 0.9 NG/DL (ref 0.78–1.46)
TSH SERPL DL<=0.005 MIU/L-ACNC: 1.69 UIU/ML (ref 0.36–3.74)

## 2018-07-17 PROCEDURE — 36415 COLL VENOUS BLD VENIPUNCTURE: CPT | Performed by: INTERNAL MEDICINE

## 2018-07-17 PROCEDURE — 84439 ASSAY OF FREE THYROXINE: CPT | Performed by: INTERNAL MEDICINE

## 2018-07-17 PROCEDURE — 84443 ASSAY THYROID STIM HORMONE: CPT | Performed by: INTERNAL MEDICINE

## 2018-09-25 PROBLEM — E66.01 SEVERE OBESITY (BMI 35.0-39.9): Status: ACTIVE | Noted: 2018-09-25

## 2018-09-25 PROBLEM — R00.2 PALPITATION: Status: ACTIVE | Noted: 2018-09-25

## 2018-09-25 PROBLEM — I44.0 FIRST DEGREE AV BLOCK: Status: ACTIVE | Noted: 2018-09-25

## 2020-06-10 NOTE — PROGRESS NOTES
TRANSFER - OUT REPORT:    Verbal report given to Lucy Duvall in egd on [place]   being transferred to endoscopy for ordered procedure       Report consisted of patients Situation, Background, Assessment and   Recommendations(SBAR). Information from the following report(s) SBAR was reviewed with the receiving nurse. Opportunity for questions and clarification was provided. Discharged

## 2020-07-03 ENCOUNTER — PATIENT OUTREACH (OUTPATIENT)
Dept: CASE MANAGEMENT | Age: 50
End: 2020-07-03

## 2020-07-03 NOTE — PROGRESS NOTES
Yellow alert noted in remote COVID-19 Loop Symptom Monitoring Program. Messaged patient to notify Rosalie Johnson if symptoms have worsened since yesterday.

## 2020-11-02 NOTE — PROGRESS NOTES
GI DAILY PROGRESS NOTE    Admit Date:  1/18/2017    Today's Date:  1/27/2017    CC:  Diarrhea and C. Difficile; UC    Subjective:     Patient : Still about 4-5BMs daily. Yesterday was difficult to quantify with enemas that were given in preparation for flex sig. No further bleeding. Still c/o unchanged lower abdominal discomfort with BM and rectal discomfort with passing of stool. This seems to be responding to po Norco. Unfortunately still requiring IV Dilaudid at times. He was having fever. .  S/p neg CXR 1/26 and f/u blood cultures pending. This morning last temp was 99.2. No c/o chest pain or shortness of breath. He was frustrated with dietary choices. Apparently he has requested no meat and this was brought to him. Colonoscopy Procedure Note 1/26/17  PreOp Diagnosis:   UC  C.  Difficile colitis  Diarrhea  Severe anemia     PostOp Diagnosis:  Pseudomembranous colitis  Moderate proctitis  External hemorrhoids    Medications:   Current Facility-Administered Medications   Medication Dose Route Frequency    vancomycin 50 mg/mL oral solution (compounded) 250 mg  250 mg Oral Q6H    metroNIDAZOLE (FLAGYL) IVPB premix 500 mg  500 mg IntraVENous Q8H    hydrocortisone (ANUSOL-HC) suppository 25 mg  25 mg Rectal Q12H PRN    HYDROcodone-acetaminophen (NORCO) 7.5-325 mg per tablet 1 Tab  1 Tab Oral Q4H PRN    pantoprazole (PROTONIX) tablet 40 mg  40 mg Oral ACB    predniSONE (DELTASONE) tablet 40 mg  40 mg Oral DAILY WITH BREAKFAST    dicyclomine (BENTYL) capsule 10 mg  10 mg Oral QID PRN    calcium carbonate (TUMS) chewable tablet 200 mg [elemental]  200 mg Oral TID PRN    alum-mag hydroxide-simeth (MYLANTA) oral suspension 30 mL  30 mL Oral Q4H PRN    lactated ringers infusion  100 mL/hr IntraVENous CONTINUOUS    0.9% sodium chloride infusion 250 mL  250 mL IntraVENous PRN    mesalamine (LIALDA) delayed release tablet 2,400 mg- PATIENT SUPPLIED  2.4 g Oral BID WITH MEALS    folic acid (FOLVITE) tablet Notified by RN patient with Hemoglobin of 7.   Patient was seen and is hemodynamically stable, denies any SOB, chest pain, bleeding from any source.   As per pt chart, Hemoglobin level on Nov 1st was around 6.9/7.0, pt is being stable.         Vital Signs Last 24 Hrs  T(C): 36.8 (02 Nov 2020 00:30), Max: 36.8 (02 Nov 2020 00:30)  T(F): 98.3 (02 Nov 2020 00:30), Max: 98.3 (02 Nov 2020 00:30)  HR: 92 (02 Nov 2020 00:30) (80 - 98)  BP: 171/82 (02 Nov 2020 00:30) (140/76 - 171/82)  RR: 18 (02 Nov 2020 00:30) (18 - 18)  SpO2: 94% (02 Nov 2020 00:30) (94% - 98%)    - will follow AM labs and monitor H/H  - If pt decompensates or AM labs abnormal, possible transfuse?  - Will endorse the AM team      Hardik Gonsalves  Dept of Medicine   #[id number] 1 mg  1 mg Oral DAILY    promethazine (PHENERGAN) tablet 12.5 mg  12.5 mg Oral Q6H PRN    sodium chloride (NS) flush 5-10 mL  5-10 mL IntraVENous Q8H    sodium chloride (NS) flush 5-10 mL  5-10 mL IntraVENous PRN    ondansetron (ZOFRAN) injection 4 mg  4 mg IntraVENous Q4H PRN    zolpidem (AMBIEN) tablet 5 mg  5 mg Oral QHS PRN    0.9% sodium chloride with KCl 20 mEq/L infusion   IntraVENous CONTINUOUS    HYDROmorphone (PF) (DILAUDID) injection 1 mg  1 mg IntraVENous Q6H PRN    acetaminophen (TYLENOL) tablet 650 mg  650 mg Oral Q4H PRN       Review of Systems:  ROS was obtained, with pertinent positives as listed above. No chest pain or SOB. Diet:  GI soft/Low residue    Objective:   Vitals:  Visit Vitals    /69    Pulse 89    Temp 99.2 °F (37.3 °C)    Resp 18    Ht 5' 9\" (1.753 m)    Wt 94 kg (207 lb 3.2 oz)    SpO2 94%    BMI 30.6 kg/m2     Intake/Output:     01/25 1901 - 01/27 0700  In: 6633 [I.V.:5170]  Out: 0   Exam:  General appearance: alert, cooperative, no distress SPOUSE AT BEDSIDE  Lungs: clear to auscultation bilaterally anteriorly  Heart: regular rate and rhythm  Abdomen: soft, +Trace lower abdominal ttp. No rebound.   Bowel sounds normal. No masses, no organomegaly  Neuro:  alert and oriented X4    Data Review (Labs):    Recent Labs      01/26/17 2018 01/26/17   0725  01/25/17   1952  01/25/17   0732  01/24/17   1910   WBC   --    --    --   15.2*   --    HGB  8.1*  7.5*  7.7*  8.7*  8.2*   HCT  27.1*  25.1*  25.4*  28.7*  27.4*   PLT   --    --    --   644*   --    MCV   --    --    --   85.9   --        Assessment:     Principal Problem:    Ulcerative colitis ([facility] (1/18/2017)    Active Problems:    Rectal bleed (12/1/2016)      Abdominal pain (12/1/2016)      Fever (1/12/2017)      Acute blood loss anemia (1/18/2017)      Clostridium difficile diarrhea (1/18/2017)       41 yo male who we admitted due to hypotension, increased diarrhea with C.diff and UC, ABLA with drop in HGB from 8.4 to 7.0 on admission1/18/17. He was started on Remicade therapy during a previous hospitalization on 12/9/16, and received the second dose on 12/22/16. He is due for 3rd dose on 1/19/17, which we are holding due to [address] [place] [address] 1/12/17. CRP elevated at 12 on 1/18/17. Our office is aware. He was currently on Lialda and Solu Medrol. Transitioned oral steroid with Prednisone 40mg daily 1/23/17 and oral antibiotics (was on po Flagyl & Vanc- De-escalated ABX to vancomycin only on 1/24/17) (Had WBCs 26.8 on admit), treat 14 days. He dropped his HGB 6.5 and required transfusion on 1/18/17. His most recent Hgb 1/24/17 is 7.5 which is improved from 7.4 & 7.2 on 1/23/17. KUB w nonspecific bowel gas pattern. WBC 15.9 on 1/21/17, improved 1/23 to 9.3, increased to around 13 on 1/24/17. Leukocytosis felt likely secondary to steroid therapy. Cultures for blood and urine negative on 1/18/17. He underwent Flexible sigmoidoscopy on 1/20/17 by Dr. Bertin Alberto which revealed mucosa with white exudate and few areas with possible pseudomembrane appearance. However, likely background of active UC as well. Was only very gradually improving. S/p Flex sig 1/26/17. See findings as outlined above. IV Flagyl added 1/26 and PO Van dosing increased following Flex sig.         Plan: 1. Continue GI soft/Low residue diet  2. Bentyl 10mg one po TID for abdominal cramping   3. Continue Prednisone 40mg one tab po daily. 4. Continue Mesalamine. Overdue for Remicade, plan to restart after C diff treatment  5. Continue oral Vancomycin 250mg q.i.d and IV Flagyl 500mg t.i.d.   6. p.o Norco as needed for abdominal pain. IV Dilaudid for severe pain. Encouraged to use oral pain med instead of IV as much as possible. 7. Await results of pathology  8. Continue to follow trend of H/H. Hgb remains low- appears stable and improving. He feels ok. No plan for transfusion pRBC at this time. Monitor with transfusions as needed.     Nelida Bradley, TAYLOR

## 2021-11-02 ENCOUNTER — HOSPITAL ENCOUNTER (OUTPATIENT)
Dept: LAB | Age: 51
Discharge: HOME | End: 2021-11-02

## 2021-11-02 PROCEDURE — 88305 TISSUE EXAM BY PATHOLOGIST: CPT

## 2022-03-18 PROBLEM — K64.8 PROLAPSED HEMORRHOIDS: Status: ACTIVE | Noted: 2017-02-04

## 2022-03-18 PROBLEM — I44.0 FIRST DEGREE AV BLOCK: Status: ACTIVE | Noted: 2018-09-25

## 2022-03-19 PROBLEM — E66.01 SEVERE OBESITY WITH BODY MASS INDEX (BMI) OF 35.0 TO 39.9 WITH SERIOUS COMORBIDITY (HCC): Status: ACTIVE | Noted: 2018-09-25

## 2022-03-19 PROBLEM — Z79.52 CURRENT CHRONIC USE OF SYSTEMIC STEROIDS: Status: ACTIVE | Noted: 2017-02-04

## 2022-03-19 PROBLEM — Z82.49 FAMILY HISTORY OF CORONARY ARTERY DISEASE: Status: ACTIVE | Noted: 2018-07-17

## 2022-03-19 PROBLEM — I44.1 MOBITZ TYPE 1 SECOND DEGREE ATRIOVENTRICULAR BLOCK: Status: ACTIVE | Noted: 2018-07-17

## 2022-03-19 PROBLEM — D62 ACUTE BLOOD LOSS ANEMIA: Status: ACTIVE | Noted: 2017-01-18

## 2022-03-19 PROBLEM — K51.90 ULCERATIVE COLITIS (HCC): Status: ACTIVE | Noted: 2017-01-18

## 2022-03-19 PROBLEM — A04.72 CLOSTRIDIUM DIFFICILE DIARRHEA: Status: ACTIVE | Noted: 2017-01-18

## 2022-03-19 PROBLEM — K51.90 ULCERATIVE COLITIS, CHRONIC (HCC): Status: ACTIVE | Noted: 2017-01-12

## 2022-03-19 PROBLEM — K51.90 COLITIS, ULCERATIVE CHRONIC (HCC): Status: ACTIVE | Noted: 2017-01-12

## 2022-03-19 PROBLEM — K62.5 RECTAL BLEEDING: Status: ACTIVE | Noted: 2017-01-12

## 2022-03-19 PROBLEM — R50.9 FEVER: Status: ACTIVE | Noted: 2017-01-12

## 2022-03-20 PROBLEM — R00.2 PALPITATION: Status: ACTIVE | Noted: 2018-09-25

## 2022-10-11 ENCOUNTER — OFFICE VISIT (OUTPATIENT)
Dept: ENT CLINIC | Age: 52
End: 2022-10-11
Payer: COMMERCIAL

## 2022-10-11 VITALS — BODY MASS INDEX: 38.95 KG/M2 | HEART RATE: 83 BPM | HEIGHT: 69 IN | WEIGHT: 263 LBS

## 2022-10-11 DIAGNOSIS — H90.3 SENSORINEURAL HEARING LOSS, BILATERAL: Primary | ICD-10-CM

## 2022-10-11 PROCEDURE — 99213 OFFICE O/P EST LOW 20 MIN: CPT | Performed by: STUDENT IN AN ORGANIZED HEALTH CARE EDUCATION/TRAINING PROGRAM

## 2022-10-11 PROCEDURE — 92567 TYMPANOMETRY: CPT | Performed by: AUDIOLOGIST

## 2022-10-11 PROCEDURE — 92557 COMPREHENSIVE HEARING TEST: CPT | Performed by: AUDIOLOGIST

## 2022-10-11 RX ORDER — MONTELUKAST SODIUM 10 MG/1
10 TABLET ORAL DAILY
COMMUNITY
Start: 2022-09-27

## 2022-10-11 RX ORDER — ZINC GLUCONATE 100 MG
TABLET ORAL
COMMUNITY

## 2022-10-11 RX ORDER — FENOFIBRATE 134 MG/1
134 CAPSULE ORAL DAILY
COMMUNITY
Start: 2022-09-27

## 2022-10-11 RX ORDER — ROSUVASTATIN CALCIUM 10 MG/1
10 TABLET, COATED ORAL
COMMUNITY
Start: 2022-09-27 | End: 2023-09-27

## 2022-10-11 RX ORDER — CETIRIZINE HYDROCHLORIDE 10 MG/1
10 TABLET ORAL DAILY
COMMUNITY
Start: 2022-09-27

## 2022-10-11 ASSESSMENT — ENCOUNTER SYMPTOMS
ABDOMINAL PAIN: 0
COUGH: 0
EYE DISCHARGE: 0

## 2022-10-11 NOTE — PROGRESS NOTES
HPI:  Dede Boyd is a 49 y.o. male seen Established   Chief Complaint   Patient presents with    Follow-up     Patient presents today for annual HL recheck with audiogram . Patient notes no difference in hearing for better or worse          73-year-old male presents for a follow-up evaluation formally seen by our NP Robel Holguin with a history of hearing loss. He was diagnosed with bilateral symmetrical moderate sensorineural hearing loss 1 year ago as he was coming in for evaluation with a suspicion of hearing loss as he had noted some chronic slow progressive changes. Since his last evaluation 1 year ago he has not noted any changes in regards to his hearing. He continues to have some intermittent struggles with conversations particular with some background noise. He does believe that at times there is some language barrier type issues as well as his hearing declines given that his primary language is [place] speaking and he has some trouble with certain [place] accents. He denies any otalgia, otorrhea, dizziness or vertigo. Past Medical History, Past Surgical History, Family history, Social History, and Medications were all reviewed with the patient today and updated as necessary. Allergies   Allergen Reactions    Ibuprofen Other (See Comments)     Colitis  Other reaction(s):  Other- (not listed) - Allergy, Unknown  R/t ulcerative colitis (per pt report)      Sulfamethoxazole-Trimethoprim Itching and Other (See Comments)    Sulfa Antibiotics Rash and Itching     Other reaction(s): Unknown       Patient Active Problem List   Diagnosis    First degree AV block    Prolapsed hemorrhoids    Rectal bleed    UC (ulcerative colitis) (HCC)    Diarrhea    Fever    Rectal bleeding    Acute blood loss anemia    Clostridium difficile diarrhea    Ulcerative colitis ([address]    Colitis, ulcerative chronic ([address]    Family history of coronary artery disease    Current chronic use of systemic steroids    Mobitz type 1 second degree atrioventricular block    Severe obesity with body mass index (BMI) of 35.0 to 39.9 with serious comorbidity (HCC)    Ulcerative colitis, chronic (HCC)    Palpitation    Abdominal pain       Current Outpatient Medications   Medication Sig    Ergocalciferol 50 MCG (2000 UT) TABS Take by mouth    fenofibrate micronized (LOFIBRA) 134 MG capsule Take 134 mg by mouth daily    montelukast (SINGULAIR) 10 MG tablet Take 10 mg by mouth daily    cetirizine (ZYRTEC) 10 MG tablet Take 10 mg by mouth daily    rosuvastatin (CRESTOR) 10 MG tablet Take 10 mg by mouth    Zinc Gluconate 100 MG TABS Take by mouth    CALCIUM-MAGNESIUM-ZINC PO Take 1 tablet by mouth daily    ascorbic acid (VITAMIN C) 500 MG tablet Take 1,000 mg by mouth 2 times daily    aspirin 81 MG EC tablet Take 81 mg by mouth daily    folic acid (FOLVITE) 1 MG tablet Take 1 mg by mouth daily    mesalamine (LIALDA) 1.2 g EC tablet Take 2.4 g by mouth    niacin (NIASPAN) 500 MG extended release tablet Take 500 mg by mouth    vedolizumab (ENTYVIO) 300 MG injection Infuse intravenously     No current facility-administered medications for this visit. Past Medical History:   Diagnosis Date    Ulcerative colitis [facility])        Past Surgical History:   Procedure Laterality Date    FLEXIBLE SIGMOIDOSCOPY N/A 12/5/2016    SIGMOIDOSCOPY FLEXIBLE performed by Dianna Doan MD at [address] N/A 1/30/2017    SIGMOIDOSCOPY FLEXIBLE performed by Tauna Sicard, MD at [address] N/A 1/20/2017    SIGMOIDOSCOPY FLEXIBLE performed by Dilma Ibrahim MD at [address] N/A 1/26/2017    SIGMOIDOSCOPY FLEXIBLE/ 31/ 636 performed by Sepideh Motta MD at [facility] ENDOSCOPY       Social History     Tobacco Use    Smoking status: Never    Smokeless tobacco: Never   Substance Use Topics    Alcohol use: Yes       History reviewed. No pertinent family history.      ROS:    Review of Systems   Constitutional:  Negative for fever. HENT:  Positive for hearing loss. Negative for ear discharge and ear pain. Eyes:  Negative for discharge. Respiratory:  Negative for cough. Cardiovascular:  Negative for chest pain. Gastrointestinal:  Negative for abdominal pain. Endocrine: Negative for cold intolerance. Genitourinary:  Negative for difficulty urinating. Musculoskeletal:  Negative for neck pain. Skin:  Negative for rash. Allergic/Immunologic: Negative for environmental allergies. Neurological:  Negative for dizziness. Hematological:  Negative for adenopathy. PHYSICAL EXAM:    Pulse 83   Ht 5' 9\" (1.753 m)   Wt 263 lb (119.3 kg)   BMI 38.84 kg/m²     Physical Exam  Vitals and nursing note reviewed. Constitutional:       Appearance: Normal appearance. HENT:      Head: Normocephalic and atraumatic. Right Ear: Tympanic membrane, ear canal and external ear normal. No middle ear effusion. There is no impacted cerumen. Tympanic membrane is not scarred, perforated, erythematous or retracted. Left Ear: Tympanic membrane, ear canal and external ear normal.  No middle ear effusion. There is no impacted cerumen. Tympanic membrane is not scarred, perforated, erythematous or retracted. Ears:      Comments: Binocular microscopy revealed:    Bilateral EACs patent without edema erythema or lesions  Bilateral TMs intact with no perforations retractions or middle ear effusions       Nose: Nose normal. No congestion or rhinorrhea. Mouth/Throat:      Mouth: Mucous membranes are moist.      Pharynx: Oropharynx is clear. No oropharyngeal exudate or posterior oropharyngeal erythema. Eyes:      General: No scleral icterus. Pulmonary:      Effort: Pulmonary effort is normal.   Musculoskeletal:      Cervical back: Normal range of motion and neck supple. No rigidity. Lymphadenopathy:      Cervical: No cervical adenopathy. Skin:     General: Skin is warm and dry. Neurological:      Mental Status: He is alert and oriented to person, place, and time. Psychiatric:         Mood and Affect: Mood normal.         Behavior: Behavior normal.            ASSESSMENT and PLAN        ICD-10-CM    1. Sensorineural hearing loss, bilateral  H90.3           He has been reassured of no concerns on today's examination of the ears with the bilateral TMs intact with no perforations retractions or middle ear effusions. Repeat comprehensive hearing testing was performed which again demonstrated mild sloping to moderate symmetrical SNHL with a speech discrimination of 92% on the right 96% on the left with bilateral type a tympanograms  We discussed and reviewed and compared today's hearing test to his hearing test from 1 year ago which thankfully shows no worsening hearing thresholds. This is great news and he can continue to follow-up in the future as needed but I would recommend that he get repeat audiograms every 2 to 3 years going forward. He will call us sooner if he begins to have any changes in regards to his hearing.     Jake Do, DO  10/11/2022

## 2022-10-11 NOTE — PROGRESS NOTES
AUDIOLOGY EVALUATION    Monica Gonzalez had Tympanometry and Audiometry performed today. The patient reports hearing loss. Results as follows:    Tympanometry    Type A -  bilaterally    Audiometry    Test Performed - Comprehensive Audiogram    Type of Loss - Right Ear: abnormal hearing: degree of loss is normal to moderately severe sensorineural hearing loss                           Left Ear: abnormal hearing: degree of loss is normal to moderately severe sensorineural hearing loss     SRT   Measurement Right Ear Left Ear   Value 35 35   Unit dB dB     Discrimination  Measurement Right Ear Left Ear   Value 92% 96%   Unit dB dB     Recommend  Binaural amplification and annual audios    A.  [id number] [place]  Audiologist

## 2023-10-24 ENCOUNTER — TELEPHONE (OUTPATIENT)
Age: 53
End: 2023-10-24

## 2023-10-24 DIAGNOSIS — R00.1 BRADYCARDIA: Primary | ICD-10-CM

## 2023-10-24 NOTE — TELEPHONE ENCOUNTER
Pt is having low heart rate and would like to schedule a appointment with Dr Natasha Bourne. Pt was last seen in 2020. Pt would appreciate a call back.

## 2023-10-24 NOTE — TELEPHONE ENCOUNTER
Pt c/o bradycardia, fatigue. Believes he needs a pacemaker. Asking for appt with Dr. Sofia Loomis. Appt scheduled 12/8 at 8:00 in [address] [facility] office.

## 2023-10-25 NOTE — TELEPHONE ENCOUNTER
Boubacar Mathis MD  You; Jill Barba, RN 20 hours ago (11:38 AM)       3 day zio and needs to me sooner. Or we can just schedule him for the ppm. He's needed one for a while. Spoke to pt, made aware of Dr. Christina Jackson response. Appt scheduled tomorrow at 3:45 for 3 day holter. Pt needs f/u appt moved up. Aware Dr. Christina Jackson  will be calling with new appt date and time. Verb understanding.

## 2023-10-30 ENCOUNTER — OFFICE VISIT (OUTPATIENT)
Age: 53
End: 2023-10-30
Payer: COMMERCIAL

## 2023-10-30 VITALS
SYSTOLIC BLOOD PRESSURE: 148 MMHG | HEIGHT: 69 IN | WEIGHT: 259 LBS | DIASTOLIC BLOOD PRESSURE: 90 MMHG | HEART RATE: 42 BPM | BODY MASS INDEX: 38.36 KG/M2

## 2023-10-30 DIAGNOSIS — R00.1 BRADYCARDIA: ICD-10-CM

## 2023-10-30 DIAGNOSIS — I44.2 COMPLETE HEART BLOCK (HCC): Primary | ICD-10-CM

## 2023-10-30 DIAGNOSIS — R00.2 PALPITATION: ICD-10-CM

## 2023-10-30 PROCEDURE — 99215 OFFICE O/P EST HI 40 MIN: CPT | Performed by: INTERNAL MEDICINE

## 2023-10-30 PROCEDURE — 93000 ELECTROCARDIOGRAM COMPLETE: CPT | Performed by: INTERNAL MEDICINE

## 2023-10-30 RX ORDER — CHLORAL HYDRATE 500 MG
2000 CAPSULE ORAL 2 TIMES DAILY
COMMUNITY

## 2023-10-30 ASSESSMENT — ENCOUNTER SYMPTOMS
EYES NEGATIVE: 1
GASTROINTESTINAL NEGATIVE: 1
RESPIRATORY NEGATIVE: 1
ALLERGIC/IMMUNOLOGIC NEGATIVE: 1

## 2023-10-30 NOTE — PROGRESS NOTES
[facility] CARDIOLOGY  [address] [facility], [place]  PHONE: [phone]        10/30/23      NAME:  Leontine Goldberg Callegaris  : 1970  MRN: [id number]     Referring Cardiologist: Manav Orr MD    Reason for Consultation: Heart block    ASSESSMENT and PLAN:  Diagnoses and all orders for this visit:    1. Palpitation  2. Mobitz type 1 second degree atrioventricular block, now complete heart block  3. Severe obesity (BMI 35.0-39.9) ([facility])  4. Clostridium difficile diarrhea  5. Complete heart block     71-year-old male with a history of high degree AV block. His ECG is now c/w complete heart block. HE IS OFF HIGH RISK AND REQUIRES A PACEMAKER. HE IS RETICENT TOWARDS THE PROCEDURE AND I FULLY EXPLAINED TO HIM THAT IF CHOOSES TO NOT GET A PACEMAKER HE IS AT RISK OF SYNCOPE OR EARLY DEATH. He will get back to me soon regarding the pacemaker procedure. I do think a MICRA AV would be a good option for him given his clinical situation.     -He has a class 1 indication for a PPM based on his finding of complete heart block on ECG. Plan for a MICRA AV after discussion with the patient. Given his young age, he is best served by the newest MICRA AV which has a 20 year battery life. He has severe medical problem, left untreated, could lead to severe outcomes, including death. His risks include CHB. -Check echo reviewed normal EF from .      -EP follow up as scheduled or PRN.     Procedure to be performed: MICRA AV implant  Device/ablation Company: [employer]  Procedure Date: TBD  Medications to hold, days to hold [place] None  Anesthesia: MAC     PACEMAKER, MICRA  Additionally, I discussed with the patient the potential risks of pacemaker implantation, including the risk of bleeding, infection, venous occlusion, DVT/PE, pneumothorax, cardiac tamponade, perforation, need for urgent open heart surgery, device/lead failure, lead dislodgement, heart attack, stroke, arrhythmia, radiation skin

## 2023-10-31 NOTE — PROGRESS NOTES
Patient pre-assessment complete for Micra Pacemaker with Dr. Tahir Gonzalez scheduled for 23 at 10:00am, arrival time 8:00am. Patient verified using . Patient instructed to bring a list of home medications on the day of procedure. NPO status reinforced. Patient instructed to follow EP Information Sheet given at appointment. Patient verbalizes understanding of all instructions & denies any questions at this time.

## 2023-11-01 ENCOUNTER — HOSPITAL ENCOUNTER (OUTPATIENT)
Age: 53
Setting detail: OBSERVATION
Discharge: HOME | End: 2023-11-02
Attending: INTERNAL MEDICINE | Admitting: INTERNAL MEDICINE
Payer: COMMERCIAL

## 2023-11-01 ENCOUNTER — APPOINTMENT (OUTPATIENT)
Dept: GENERAL RADIOLOGY | Age: 53
End: 2023-11-01
Attending: INTERNAL MEDICINE
Payer: COMMERCIAL

## 2023-11-01 ENCOUNTER — ANESTHESIA EVENT (OUTPATIENT)
Dept: CARDIAC CATH/INVASIVE PROCEDURES | Age: 53
End: 2023-11-01
Payer: COMMERCIAL

## 2023-11-01 ENCOUNTER — ANESTHESIA (OUTPATIENT)
Dept: CARDIAC CATH/INVASIVE PROCEDURES | Age: 53
End: 2023-11-01
Payer: COMMERCIAL

## 2023-11-01 DIAGNOSIS — I44.2 COMPLETE HEART BLOCK (HCC): ICD-10-CM

## 2023-11-01 DIAGNOSIS — Z95.0 PACEMAKER: Primary | ICD-10-CM

## 2023-11-01 LAB
ANION GAP SERPL CALC-SCNC: 1 MMOL/L (ref 2–11)
BASOPHILS # BLD: 0.1 K/UL (ref 0–0.2)
BASOPHILS NFR BLD: 1 % (ref 0–2)
BUN SERPL-MCNC: 11 MG/DL (ref 6–23)
CALCIUM SERPL-MCNC: 8.8 MG/DL (ref 8.3–10.4)
CHLORIDE SERPL-SCNC: 112 MMOL/L (ref 101–110)
CO2 SERPL-SCNC: 26 MMOL/L (ref 21–32)
CREAT SERPL-MCNC: 1.1 MG/DL (ref 0.8–1.5)
DIFFERENTIAL METHOD BLD: ABNORMAL
EKG ATRIAL RATE: 62 BPM
EKG DIAGNOSIS: NORMAL
EKG P AXIS: 44 DEGREES
EKG Q-T INTERVAL: 520 MS
EKG QRS DURATION: 98 MS
EKG QTC CALCULATION (BAZETT): 418 MS
EKG R AXIS: -80 DEGREES
EKG T AXIS: 110 DEGREES
EKG VENTRICULAR RATE: 39 BPM
EOSINOPHIL # BLD: 0.4 K/UL (ref 0–0.8)
EOSINOPHIL NFR BLD: 4 % (ref 0.5–7.8)
ERYTHROCYTE [DISTWIDTH] IN BLOOD BY AUTOMATED COUNT: 13.2 % (ref 11.9–14.6)
GLUCOSE SERPL-MCNC: 95 MG/DL (ref 65–100)
HCT VFR BLD AUTO: 49.5 % (ref 41.1–50.3)
HGB BLD-MCNC: 15.8 G/DL (ref 13.6–17.2)
IMM GRANULOCYTES # BLD AUTO: 0 K/UL (ref 0–0.5)
IMM GRANULOCYTES NFR BLD AUTO: 0 % (ref 0–5)
LYMPHOCYTES # BLD: 2.7 K/UL (ref 0.5–4.6)
LYMPHOCYTES NFR BLD: 28 % (ref 13–44)
MAGNESIUM SERPL-MCNC: 2 MG/DL (ref 1.8–2.4)
MCH RBC QN AUTO: 28.1 PG (ref 26.1–32.9)
MCHC RBC AUTO-ENTMCNC: 31.9 G/DL (ref 31.4–35)
MCV RBC AUTO: 87.9 FL (ref 82–102)
MONOCYTES # BLD: 0.9 K/UL (ref 0.1–1.3)
MONOCYTES NFR BLD: 9 % (ref 4–12)
NEUTS SEG # BLD: 5.9 K/UL (ref 1.7–8.2)
NEUTS SEG NFR BLD: 58 % (ref 43–78)
NRBC # BLD: 0 K/UL (ref 0–0.2)
PLATELET # BLD AUTO: 184 K/UL (ref 150–450)
PMV BLD AUTO: 9.7 FL (ref 9.4–12.3)
POTASSIUM SERPL-SCNC: 4.9 MMOL/L (ref 3.5–5.1)
RBC # BLD AUTO: 5.63 M/UL (ref 4.23–5.6)
SODIUM SERPL-SCNC: 139 MMOL/L (ref 133–143)
WBC # BLD AUTO: 10 K/UL (ref 4.3–11.1)

## 2023-11-01 PROCEDURE — 6360000002 HC RX W HCPCS: Performed by: INTERNAL MEDICINE

## 2023-11-01 PROCEDURE — 83735 ASSAY OF MAGNESIUM: CPT

## 2023-11-01 PROCEDURE — 3700000000 HC ANESTHESIA ATTENDED CARE: Performed by: INTERNAL MEDICINE

## 2023-11-01 PROCEDURE — 71045 X-RAY EXAM CHEST 1 VIEW: CPT

## 2023-11-01 PROCEDURE — 85025 COMPLETE CBC W/AUTO DIFF WBC: CPT

## 2023-11-01 PROCEDURE — 2500000003 HC RX 250 WO HCPCS: Performed by: INTERNAL MEDICINE

## 2023-11-01 PROCEDURE — 6370000000 HC RX 637 (ALT 250 FOR IP): Performed by: NURSE PRACTITIONER

## 2023-11-01 PROCEDURE — C1786 PMKR, SINGLE, RATE-RESP: HCPCS

## 2023-11-01 PROCEDURE — 3700000001 HC ADD 15 MINUTES (ANESTHESIA): Performed by: INTERNAL MEDICINE

## 2023-11-01 PROCEDURE — 6370000000 HC RX 637 (ALT 250 FOR IP): Performed by: INTERNAL MEDICINE

## 2023-11-01 PROCEDURE — 6360000004 HC RX CONTRAST MEDICATION: Performed by: INTERNAL MEDICINE

## 2023-11-01 PROCEDURE — 33274 TCAT INSJ/RPL PERM LDLS PM: CPT | Performed by: INTERNAL MEDICINE

## 2023-11-01 PROCEDURE — 2580000003 HC RX 258: Performed by: NURSE ANESTHETIST, CERTIFIED REGISTERED

## 2023-11-01 PROCEDURE — 12220.00 HC MISC PACEMAKER SGL RATE

## 2023-11-01 PROCEDURE — C1894 INTRO/SHEATH, NON-LASER: HCPCS | Performed by: INTERNAL MEDICINE

## 2023-11-01 PROCEDURE — C1769 GUIDE WIRE: HCPCS | Performed by: INTERNAL MEDICINE

## 2023-11-01 PROCEDURE — 93010 ELECTROCARDIOGRAM REPORT: CPT | Performed by: INTERNAL MEDICINE

## 2023-11-01 PROCEDURE — 2500000003 HC RX 250 WO HCPCS: Performed by: NURSE ANESTHETIST, CERTIFIED REGISTERED

## 2023-11-01 PROCEDURE — 6360000002 HC RX W HCPCS: Performed by: NURSE ANESTHETIST, CERTIFIED REGISTERED

## 2023-11-01 PROCEDURE — G0378 HOSPITAL OBSERVATION PER HR: HCPCS

## 2023-11-01 PROCEDURE — 2580000003 HC RX 258: Performed by: INTERNAL MEDICINE

## 2023-11-01 PROCEDURE — 93005 ELECTROCARDIOGRAM TRACING: CPT | Performed by: INTERNAL MEDICINE

## 2023-11-01 PROCEDURE — 80048 BASIC METABOLIC PNL TOTAL CA: CPT

## 2023-11-01 PROCEDURE — 2709999900 HC NON-CHARGEABLE SUPPLY: Performed by: INTERNAL MEDICINE

## 2023-11-01 RX ORDER — CETIRIZINE HYDROCHLORIDE 10 MG/1
10 TABLET ORAL DAILY
Status: DISCONTINUED | OUTPATIENT
Start: 2023-11-02 | End: 2023-11-02 | Stop reason: HOSPADM

## 2023-11-01 RX ORDER — ACETAMINOPHEN 325 MG/1
650 TABLET ORAL EVERY 4 HOURS PRN
Status: DISCONTINUED | OUTPATIENT
Start: 2023-11-01 | End: 2023-11-02 | Stop reason: HOSPADM

## 2023-11-01 RX ORDER — OXYCODONE HYDROCHLORIDE 5 MG/1
5 TABLET ORAL EVERY 4 HOURS PRN
Status: DISCONTINUED | OUTPATIENT
Start: 2023-11-01 | End: 2023-11-02 | Stop reason: HOSPADM

## 2023-11-01 RX ORDER — ASCORBIC ACID 500 MG
1000 TABLET ORAL 2 TIMES DAILY
Status: DISCONTINUED | OUTPATIENT
Start: 2023-11-01 | End: 2023-11-02 | Stop reason: HOSPADM

## 2023-11-01 RX ORDER — POLYETHYLENE GLYCOL 3350 17 G/17G
17 POWDER, FOR SOLUTION ORAL DAILY PRN
Status: DISCONTINUED | OUTPATIENT
Start: 2023-11-01 | End: 2023-11-02 | Stop reason: HOSPADM

## 2023-11-01 RX ORDER — LIDOCAINE HYDROCHLORIDE 20 MG/ML
INJECTION, SOLUTION EPIDURAL; INFILTRATION; INTRACAUDAL; PERINEURAL PRN
Status: DISCONTINUED | OUTPATIENT
Start: 2023-11-01 | End: 2023-11-01 | Stop reason: SDUPTHER

## 2023-11-01 RX ORDER — ASPIRIN 81 MG/1
81 TABLET ORAL DAILY
Status: DISCONTINUED | OUTPATIENT
Start: 2023-11-01 | End: 2023-11-02 | Stop reason: HOSPADM

## 2023-11-01 RX ORDER — MONTELUKAST SODIUM 10 MG/1
10 TABLET ORAL DAILY
Status: DISCONTINUED | OUTPATIENT
Start: 2023-11-02 | End: 2023-11-02 | Stop reason: HOSPADM

## 2023-11-01 RX ORDER — PROPOFOL 10 MG/ML
INJECTION, EMULSION INTRAVENOUS PRN
Status: DISCONTINUED | OUTPATIENT
Start: 2023-11-01 | End: 2023-11-01 | Stop reason: SDUPTHER

## 2023-11-01 RX ORDER — AMLODIPINE BESYLATE 5 MG/1
5 TABLET ORAL DAILY
Status: DISCONTINUED | OUTPATIENT
Start: 2023-11-01 | End: 2023-11-02 | Stop reason: HOSPADM

## 2023-11-01 RX ORDER — SODIUM CHLORIDE 0.9 % (FLUSH) 0.9 %
5-40 SYRINGE (ML) INJECTION EVERY 12 HOURS SCHEDULED
Status: DISCONTINUED | OUTPATIENT
Start: 2023-11-01 | End: 2023-11-02 | Stop reason: HOSPADM

## 2023-11-01 RX ORDER — MESALAMINE 400 MG/1
800 CAPSULE, DELAYED RELEASE ORAL 3 TIMES DAILY
Status: DISCONTINUED | OUTPATIENT
Start: 2023-11-02 | End: 2023-11-02 | Stop reason: HOSPADM

## 2023-11-01 RX ORDER — ONDANSETRON 2 MG/ML
4 INJECTION INTRAMUSCULAR; INTRAVENOUS EVERY 6 HOURS PRN
Status: DISCONTINUED | OUTPATIENT
Start: 2023-11-01 | End: 2023-11-02 | Stop reason: HOSPADM

## 2023-11-01 RX ORDER — SODIUM CHLORIDE, SODIUM LACTATE, POTASSIUM CHLORIDE, CALCIUM CHLORIDE 600; 310; 30; 20 MG/100ML; MG/100ML; MG/100ML; MG/100ML
INJECTION, SOLUTION INTRAVENOUS CONTINUOUS PRN
Status: DISCONTINUED | OUTPATIENT
Start: 2023-11-01 | End: 2023-11-01 | Stop reason: SDUPTHER

## 2023-11-01 RX ORDER — TEMAZEPAM 15 MG/1
15 CAPSULE ORAL NIGHTLY PRN
Status: DISCONTINUED | OUTPATIENT
Start: 2023-11-01 | End: 2023-11-02 | Stop reason: HOSPADM

## 2023-11-01 RX ORDER — FOLIC ACID 1 MG/1
1 TABLET ORAL DAILY
Status: DISCONTINUED | OUTPATIENT
Start: 2023-11-02 | End: 2023-11-02 | Stop reason: HOSPADM

## 2023-11-01 RX ORDER — MAGNESIUM HYDROXIDE/ALUMINUM HYDROXICE/SIMETHICONE 120; 1200; 1200 MG/30ML; MG/30ML; MG/30ML
15 SUSPENSION ORAL EVERY 6 HOURS PRN
Status: DISCONTINUED | OUTPATIENT
Start: 2023-11-01 | End: 2023-11-02 | Stop reason: HOSPADM

## 2023-11-01 RX ORDER — FENOFIBRATE 54 MG/1
54 TABLET ORAL DAILY
Status: DISCONTINUED | OUTPATIENT
Start: 2023-11-02 | End: 2023-11-02 | Stop reason: HOSPADM

## 2023-11-01 RX ORDER — ROSUVASTATIN CALCIUM 10 MG/1
10 TABLET, COATED ORAL NIGHTLY
Status: DISCONTINUED | OUTPATIENT
Start: 2023-11-01 | End: 2023-11-02 | Stop reason: HOSPADM

## 2023-11-01 RX ORDER — LIDOCAINE HYDROCHLORIDE 10 MG/ML
INJECTION, SOLUTION INFILTRATION; PERINEURAL PRN
Status: DISCONTINUED | OUTPATIENT
Start: 2023-11-01 | End: 2023-11-01 | Stop reason: HOSPADM

## 2023-11-01 RX ORDER — SODIUM CHLORIDE 9 MG/ML
INJECTION, SOLUTION INTRAVENOUS PRN
Status: DISCONTINUED | OUTPATIENT
Start: 2023-11-01 | End: 2023-11-02 | Stop reason: HOSPADM

## 2023-11-01 RX ORDER — SODIUM CHLORIDE 0.9 % (FLUSH) 0.9 %
5-40 SYRINGE (ML) INJECTION PRN
Status: DISCONTINUED | OUTPATIENT
Start: 2023-11-01 | End: 2023-11-02 | Stop reason: HOSPADM

## 2023-11-01 RX ORDER — HEPARIN SODIUM 1000 [USP'U]/ML
INJECTION, SOLUTION INTRAVENOUS; SUBCUTANEOUS PRN
Status: DISCONTINUED | OUTPATIENT
Start: 2023-11-01 | End: 2023-11-01 | Stop reason: SDUPTHER

## 2023-11-01 RX ORDER — OXYCODONE HYDROCHLORIDE 5 MG/1
10 TABLET ORAL EVERY 4 HOURS PRN
Status: DISCONTINUED | OUTPATIENT
Start: 2023-11-01 | End: 2023-11-02 | Stop reason: HOSPADM

## 2023-11-01 RX ADMIN — Medication 2 G: at 12:32

## 2023-11-01 RX ADMIN — TEMAZEPAM 15 MG: 15 CAPSULE ORAL at 21:37

## 2023-11-01 RX ADMIN — PROPOFOL 50 MG: 10 INJECTION, EMULSION INTRAVENOUS at 12:14

## 2023-11-01 RX ADMIN — SODIUM CHLORIDE, PRESERVATIVE FREE 10 ML: 5 INJECTION INTRAVENOUS at 21:26

## 2023-11-01 RX ADMIN — AMLODIPINE BESYLATE 5 MG: 5 TABLET ORAL at 21:37

## 2023-11-01 RX ADMIN — PROPOFOL 50 MG: 10 INJECTION, EMULSION INTRAVENOUS at 12:11

## 2023-11-01 RX ADMIN — ROSUVASTATIN 10 MG: 10 TABLET, FILM COATED ORAL at 21:26

## 2023-11-01 RX ADMIN — SODIUM CHLORIDE, SODIUM LACTATE, POTASSIUM CHLORIDE, AND CALCIUM CHLORIDE: 600; 310; 30; 20 INJECTION, SOLUTION INTRAVENOUS at 11:56

## 2023-11-01 RX ADMIN — LIDOCAINE HYDROCHLORIDE 40 MG: 20 INJECTION, SOLUTION EPIDURAL; INFILTRATION; INTRACAUDAL; PERINEURAL at 12:11

## 2023-11-01 RX ADMIN — HEPARIN SODIUM 3000 UNITS: 1000 INJECTION INTRAVENOUS; SUBCUTANEOUS at 12:25

## 2023-11-01 RX ADMIN — HEPARIN SODIUM 3000 UNITS: 1000 INJECTION INTRAVENOUS; SUBCUTANEOUS at 13:08

## 2023-11-01 RX ADMIN — ASPIRIN 81 MG: 81 TABLET ORAL at 16:08

## 2023-11-01 RX ADMIN — OXYCODONE HYDROCHLORIDE AND ACETAMINOPHEN 1000 MG: 500 TABLET ORAL at 21:26

## 2023-11-01 RX ADMIN — PROPOFOL 140 MCG/KG/MIN: 10 INJECTION, EMULSION INTRAVENOUS at 12:17

## 2023-11-01 RX ADMIN — ACETAMINOPHEN 650 MG: 325 TABLET ORAL at 16:11

## 2023-11-01 ASSESSMENT — PAIN DESCRIPTION - LOCATION: LOCATION: HEAD

## 2023-11-01 ASSESSMENT — PAIN SCALES - GENERAL: PAINLEVEL_OUTOF10: 5

## 2023-11-01 NOTE — PROCEDURES
: Lisset Scott. Caleb Peña MD    REFERRING: Self    PRE-PROCEDURE DIAGNOSIS:  1. Documented non-reversible symptomatic bradycardia due to sick sinus syndrome    PROCEDURES PERFORMED: Medtronic MICRA Leadless Pacemaker Implantation    INDICATION: High degree AV block, young patient. CHARGE: This procedure is similar in nature, scope, and procedure time to a single chamber pacemaker. We elected to pursue a leadless system due to the above listed concerns in the indication. Anesthesia: GA    Estimated Blood Loss: Less than 10 mL     Specimens: * No specimens in log *     Fluoroscopy Time: 7.3 minutes    Complications: None    Contrast: 30 cc    PATIENT INFORMATION AND INDICATIONS: The procedure, indications, risks, benefits, and alternatives were discussed with the patient and family members, who desired to proceed after questions were answered and informed consent was documented. PROCEDURAL DESCRIPTION:  The patient was brought to the EP lab in a fasting, non-sedated state after informed consent was obtained. The right groin was then prepped and draped in the usual sterile fashion. Under sterile conditions, 1% Lidocaine was used for local anesthesia to the right femoral groin area. Access was obtained in the right femoral vein using the modified Seldinger technique under ultrasound guidance with placement of an 8 Fr sheath. An 0.035 Amplatz extra-stiff wire was then advanced thru the 8 Fr sheath into the SVC. The 8 Fr sheath was removed from the right femoral vein, and the access site was pre dilated with 12, 16, and 20 Fr dilators, respectively, and finally followed by the Medtronic Micra introducer sheath. A bolus of unfractionated heparin 3000 units was given followed by a heparin infusion administered through the sheath at 250 units an hour (this was repeated at every 20 minutes of procedure time and a total of 6000 units of heparin were given).  The Micra introducer sheath was able to pass over

## 2023-11-01 NOTE — PROGRESS NOTES
Patient received to [facility] room # 10  Ambulatory from [facility]. Patient scheduled for MICRA today with Dr Rocio Davidson. Procedure reviewed & questions answered, voiced good understanding consent obtained & placed on chart. All medications and medical history reviewed. Will prep patient per orders. Patient & family updated on plan of care. The patient has a fraility score of 3-MANAGING WELL, based on ability to perform ADLS by self.

## 2023-11-01 NOTE — CARE COORDINATION
Patient admitted in Obs status for scheduled PPI -Micra to treatment complete heart block. CM scanned medical record for discharge needs. No CM needs anticipated. CM will remain available for consult. 11/01/23 3065   Service Assessment   Patient Orientation Alert and Oriented   Cognition Alert   History Provided By Medical Record   Primary Caregiver Self   Accompanied By/Relationship Unknown   Support Systems Spouse/Significant Other;Family Members   PCP Verified by CM Yes   Last Visit to PCP Within last 3 months   Prior Functional Level Independent in ADLs/IADLs   Current Functional Level Independent in ADLs/IADLs   Can patient return to prior living arrangement Yes   Ability to make needs known: Good   Family able to assist with home care needs: Yes   Would you like for me to discuss the discharge plan with any other family members/significant others, and if so, who? No   Financial Resources Other (Comment)  (Commercial insurance)   Community Resources None   Social/Functional History   Lives With Spouse   ADL Assistance Independent   Homemaking Assistance Independent   Ambulation Assistance Independent   Transfer Assistance Independent   Active  Yes   Mode of Transportation Car   Occupation Full time employment   Type of Occupation [employer]   Discharge Planning   Type of [address] [place] [address] Prior To Admission None   Potential Assistance Needed N/A   DME Ordered? No   Potential Assistance Purchasing Medications No   Type of Home Care Services None   Patient expects to be discharged to: [facility] Discharge   Transition of Care Consult (CM Consult) Discharge [facility] None   [facility] Information Provided?  No   Mode of Transport at Discharge Other (see comment)  (Car)   Confirm Follow Up Transport Family   Condition of Participation: Discharge Planning   The Plan for Transition of

## 2023-11-01 NOTE — PROGRESS NOTES
TRANSFER - OUT REPORT:    Verbal report given to [address] [facility] [employer]  being transferred to [phone] for routine progression of patient care       Report consisted of patient's Situation, Background, Assessment and   Recommendations(SBAR). Information from the following report(s) Nurse Handoff Report was reviewed with the receiving nurse. Lines:   Peripheral IV 11/01/23 Left Antecubital (Active)        Opportunity for questions and clarification was provided. Patient transported with:  Registered Nurse    Right groin site checked with Shivam Nichols RN and [facility] RN, no signs of oozing or hematoma.

## 2023-11-01 NOTE — PROGRESS NOTES
Report received from EP Cath Lab RN. Procedural findings communicated. Intra procedural  medication administration reviewed. Progression of care discussed.      Patient received into [facility] 5 post sheath removal.     Access site without bleeding or swelling yes    Dressing dry and intact yes    Patient instructed to limit movement to right lower extremity    Routine post procedural vital signs and site assessment initiated yes

## 2023-11-01 NOTE — ANESTHESIA PRE PROCEDURE
Department of Anesthesiology  Preprocedure Note       Name:  Oli Peres   Age:  47 y.o.  :  1970                                          MRN:  [id number]         Date:  2023      Surgeon: Liliya Cardoso):  Diana Felix MD    Procedure: Procedure(s): Insert or replace transcath PPM leadless    Medications prior to admission:   Prior to Admission medications    Medication Sig Start Date End Date Taking?  Authorizing Provider   Omega-3 Fatty Acids (FISH OIL) 1000 MG capsule Take 2 capsules by mouth 2 times daily    Luke Matamoros MD   VITAMIN E PO Take by mouth    Luke Matamoros MD   VITAMIN D PO Take by mouth    Luke Matamoros MD   Ergocalciferol 50 MCG (2000) TABS Take by mouth    Luke Matamoros MD   fenofibrate micronized (LOFIBRA) 134 MG capsule Take 1 capsule by mouth daily 22   Luke Matamoros MD   montelukast (SINGULAIR) 10 MG tablet Take 1 tablet by mouth daily 22   Luke Matamoros MD   cetirizine (ZYRTEC) 10 MG tablet Take 1 tablet by mouth daily 22   Luke Matamoros MD   rosuvastatin (CRESTOR) 10 MG tablet Take 1 tablet by mouth 9/27/22 10/30/23  Luke Matamoros MD   CALCIUM-MAGNESIUM-ZINC PO Take 1 tablet by mouth daily    Automatic Reconciliation, Ar   ascorbic acid (VITAMIN C) 500 MG tablet Take 2 tablets by mouth 2 times daily    Automatic Reconciliation, Ar   aspirin 81 MG EC tablet Take 1 tablet by mouth daily    Automatic Reconciliation, Ar   folic acid (FOLVITE) 1 MG tablet Take 1 tablet by mouth daily    Automatic Reconciliation, Ar   mesalamine (LIALDA) 1.2 g EC tablet Take 2 tablets by mouth    Automatic Reconciliation, Ar   niacin (NIASPAN) 500 MG extended release tablet Take 500 mg by mouth  Patient not taking: Reported on 10/30/2023    Automatic Reconciliation, Ar   vedolizumab (ENTYVIO) 300 MG injection Infuse intravenously Once every 8 weeks    Automatic Reconciliation, Ar       Current medications:

## 2023-11-02 VITALS
BODY MASS INDEX: 38.36 KG/M2 | TEMPERATURE: 97.9 F | RESPIRATION RATE: 15 BRPM | WEIGHT: 259 LBS | HEIGHT: 69 IN | SYSTOLIC BLOOD PRESSURE: 154 MMHG | HEART RATE: 65 BPM | DIASTOLIC BLOOD PRESSURE: 86 MMHG | OXYGEN SATURATION: 96 %

## 2023-11-02 PROCEDURE — 2580000003 HC RX 258: Performed by: INTERNAL MEDICINE

## 2023-11-02 PROCEDURE — G0378 HOSPITAL OBSERVATION PER HR: HCPCS

## 2023-11-02 PROCEDURE — 6370000000 HC RX 637 (ALT 250 FOR IP)

## 2023-11-02 PROCEDURE — 6370000000 HC RX 637 (ALT 250 FOR IP): Performed by: INTERNAL MEDICINE

## 2023-11-02 PROCEDURE — 6370000000 HC RX 637 (ALT 250 FOR IP): Performed by: NURSE PRACTITIONER

## 2023-11-02 RX ORDER — SODIUM CHLORIDE 0.9 % (FLUSH) 0.9 %
5-40 SYRINGE (ML) INJECTION PRN
Status: DISCONTINUED | OUTPATIENT
Start: 2023-11-02 | End: 2023-11-02

## 2023-11-02 RX ORDER — LISINOPRIL 5 MG/1
5 TABLET ORAL DAILY
Status: DISCONTINUED | OUTPATIENT
Start: 2023-11-02 | End: 2023-11-02 | Stop reason: HOSPADM

## 2023-11-02 RX ORDER — LISINOPRIL 5 MG/1
5 TABLET ORAL DAILY
Qty: 30 TABLET | Refills: 3 | Status: SHIPPED | OUTPATIENT
Start: 2023-11-02

## 2023-11-02 RX ORDER — MIDAZOLAM HYDROCHLORIDE 2 MG/2ML
2 INJECTION, SOLUTION INTRAMUSCULAR; INTRAVENOUS
Status: DISCONTINUED | OUTPATIENT
Start: 2023-11-02 | End: 2023-11-02

## 2023-11-02 RX ORDER — HYDROMORPHONE HYDROCHLORIDE 2 MG/ML
0.5 INJECTION, SOLUTION INTRAMUSCULAR; INTRAVENOUS; SUBCUTANEOUS EVERY 5 MIN PRN
Status: DISCONTINUED | OUTPATIENT
Start: 2023-11-02 | End: 2023-11-02

## 2023-11-02 RX ORDER — OXYCODONE HYDROCHLORIDE 5 MG/1
5 TABLET ORAL
Status: DISCONTINUED | OUTPATIENT
Start: 2023-11-02 | End: 2023-11-02

## 2023-11-02 RX ORDER — SODIUM CHLORIDE 9 MG/ML
INJECTION, SOLUTION INTRAVENOUS PRN
Status: DISCONTINUED | OUTPATIENT
Start: 2023-11-02 | End: 2023-11-02

## 2023-11-02 RX ORDER — SODIUM CHLORIDE, SODIUM LACTATE, POTASSIUM CHLORIDE, CALCIUM CHLORIDE 600; 310; 30; 20 MG/100ML; MG/100ML; MG/100ML; MG/100ML
INJECTION, SOLUTION INTRAVENOUS CONTINUOUS
Status: DISCONTINUED | OUTPATIENT
Start: 2023-11-02 | End: 2023-11-02

## 2023-11-02 RX ORDER — SODIUM CHLORIDE 0.9 % (FLUSH) 0.9 %
5-40 SYRINGE (ML) INJECTION EVERY 12 HOURS SCHEDULED
Status: DISCONTINUED | OUTPATIENT
Start: 2023-11-02 | End: 2023-11-02

## 2023-11-02 RX ORDER — HYDROCODONE BITARTRATE AND ACETAMINOPHEN 5; 325 MG/1; MG/1
1 TABLET ORAL EVERY 8 HOURS PRN
Qty: 9 TABLET | Refills: 0 | Status: SHIPPED | OUTPATIENT
Start: 2023-11-02 | End: 2023-11-05

## 2023-11-02 RX ORDER — FENTANYL CITRATE 50 UG/ML
100 INJECTION, SOLUTION INTRAMUSCULAR; INTRAVENOUS
Status: DISCONTINUED | OUTPATIENT
Start: 2023-11-02 | End: 2023-11-02

## 2023-11-02 RX ORDER — LIDOCAINE HYDROCHLORIDE 10 MG/ML
1 INJECTION, SOLUTION INFILTRATION; PERINEURAL
Status: DISCONTINUED | OUTPATIENT
Start: 2023-11-02 | End: 2023-11-02

## 2023-11-02 RX ORDER — AMLODIPINE BESYLATE 5 MG/1
10 TABLET ORAL DAILY
Qty: 30 TABLET | Refills: 3 | Status: SHIPPED | OUTPATIENT
Start: 2023-11-03 | End: 2023-11-02 | Stop reason: SDUPTHER

## 2023-11-02 RX ORDER — AMLODIPINE BESYLATE 5 MG/1
5 TABLET ORAL DAILY
Qty: 30 TABLET | Refills: 3 | Status: SHIPPED | OUTPATIENT
Start: 2023-11-03 | End: 2023-11-02 | Stop reason: SDUPTHER

## 2023-11-02 RX ORDER — PROCHLORPERAZINE EDISYLATE 5 MG/ML
5 INJECTION INTRAMUSCULAR; INTRAVENOUS
Status: DISCONTINUED | OUTPATIENT
Start: 2023-11-02 | End: 2023-11-02

## 2023-11-02 RX ORDER — AMLODIPINE BESYLATE 5 MG/1
10 TABLET ORAL DAILY
Qty: 60 TABLET | Refills: 3 | Status: SHIPPED | OUTPATIENT
Start: 2023-11-03

## 2023-11-02 RX ORDER — AMLODIPINE BESYLATE 5 MG/1
5 TABLET ORAL ONCE
Status: COMPLETED | OUTPATIENT
Start: 2023-11-02 | End: 2023-11-02

## 2023-11-02 RX ADMIN — LISINOPRIL 5 MG: 5 TABLET ORAL at 13:03

## 2023-11-02 RX ADMIN — FOLIC ACID 1 MG: 1 TABLET ORAL at 08:16

## 2023-11-02 RX ADMIN — MESALAMINE 800 MG: 400 CAPSULE, DELAYED RELEASE ORAL at 13:03

## 2023-11-02 RX ADMIN — FENOFIBRATE 54 MG: 54 TABLET ORAL at 08:16

## 2023-11-02 RX ADMIN — OXYCODONE HYDROCHLORIDE AND ACETAMINOPHEN 1000 MG: 500 TABLET ORAL at 08:16

## 2023-11-02 RX ADMIN — CETIRIZINE HYDROCHLORIDE 10 MG: 10 TABLET, FILM COATED ORAL at 08:16

## 2023-11-02 RX ADMIN — AMLODIPINE BESYLATE 5 MG: 5 TABLET ORAL at 08:16

## 2023-11-02 RX ADMIN — MESALAMINE 800 MG: 400 CAPSULE, DELAYED RELEASE ORAL at 08:16

## 2023-11-02 RX ADMIN — ASPIRIN 81 MG: 81 TABLET ORAL at 08:16

## 2023-11-02 RX ADMIN — SODIUM CHLORIDE, PRESERVATIVE FREE 10 ML: 5 INJECTION INTRAVENOUS at 08:16

## 2023-11-02 RX ADMIN — MONTELUKAST 10 MG: 10 TABLET, FILM COATED ORAL at 08:16

## 2023-11-02 RX ADMIN — AMLODIPINE BESYLATE 5 MG: 5 TABLET ORAL at 10:35

## 2023-11-02 NOTE — PLAN OF CARE
[facility] 3 TELEMETRY  [address] [facility]  Phone: [phone]             November 2, 2023    Patient: Minoo Miranda   YOB: 1970   Date of Visit: 11/1/2023       To Whom It May Concern: Minoo Miranda was seen and treated in our facility beginning 11/1/2023 until 11/2/2023. He may return to work on Monday 11/06/2023 .       Sincerely,       LADONNA Kim         Signature:__________________________________

## 2023-11-02 NOTE — PROGRESS NOTES
R groin post MICRA pacemaker implantation suture removed as ordered. Covered with gauze and tegaderm. Sites clean, dry, and intact. Patient tolerated well with no complaints of pain or discomfort. Will continue to monitor.

## 2023-11-02 NOTE — DISCHARGE SUMMARY
[facility] Cardiology Discharge Summary     Patient ID:  Shayna Jones  [id number]  47 y.o.  1970    Admit date: 11/1/2023    Discharge date:  11/2/2023    Admitting Physician: Donn Esquivel MD     Discharge Physician: LADONNA Rangel  / Dr. Hezekiah Landau    Admission Diagnoses: Complete heart block [facility]) [I44.2]    Discharge Diagnoses:   Patient Active Problem List    Diagnosis    Complete heart block [facility])    Symptomatic bradycardia       Cardiology Procedures this admission:   leadless PM implantation  Consults: none    Hospital Course: Patient was seen at the office of [facility] Cardiology by Dr. Julio Cesar Caballero for management of symptomatic bradycardia and was subsequently scheduled for an AM Admission PM implantation at [facility] on 11/1/23. Patient was taken to the EP lab and underwent successful implantation of Medtronic MICRA leadless PM by Dr. Julio Cesar Caballero. Patient tolerated the procedure well and was taken to the telemetry floor for recovery. Follow up chest xray showed no pneumothorax. The following morning patient was up feeling well without any complaints of chest pain, shortness of breath, or palpitations. Patient's left subclavian cath site was clean, dry and intact without hematoma. Patient's labs were WNL. Patient was seen and examined by Dr. Hezekiah Landau and determined stable and ready for discharge. Patient was instructed on the importance of medication compliance and outpatient follow up. BP elevated and on review of Kim notes, appears chronically so. Patient started on amlodipine and lisinopril, after patient's bp still elevated with just the former antihypertensive. Patient has been scheduled for follow-up with [facility] Cardiology -- device clinic on 11/16/23 at 3 pm. Work note requested and provided with return date of Monday 11/06/23. DISPOSITION: Patient has been instructed to keep affected arm below shoulder level for the next 4 weeks or until cleared by doctor.   The arm sling should

## 2023-11-02 NOTE — CARE COORDINATION
CM reviewed discharge summary for possible CM needs. Milestones met. Personal scheduling for transportation home. Disposition: Home with family assistance.

## 2023-11-02 NOTE — PLAN OF CARE
Problem: Discharge Planning  Goal: Discharge to home or other facility with appropriate resources  Outcome: Adequate for Discharge  Flowsheets (Taken 11/1/2023 2030 by Zuri Wells, RN)  Discharge to home or other facility with appropriate resources:   Identify barriers to discharge with patient and caregiver   Arrange for needed discharge resources and transportation as appropriate   Identify discharge learning needs (meds, wound care, etc)   Refer to discharge planning if patient needs post-hospital services based on physician order or complex needs related to functional status, cognitive ability or social support system   Arrange for interpreters to assist at discharge as needed     Problem: ABCDS Injury Assessment  Goal: Absence of physical injury  Outcome: Adequate for Discharge  Flowsheets (Taken 11/1/2023 2030 by Zuri Wells, RN)  Absence of Physical Injury: Implement safety measures based on patient assessment

## 2023-11-02 NOTE — CARE COORDINATION
Patient with discharge order placed. S/P Micra PM placement. CM scanned discharge summary. No CM needs identified. Personal scheduling for transportation home.

## 2023-11-16 ENCOUNTER — NURSE ONLY (OUTPATIENT)
Age: 53
End: 2023-11-16

## 2023-11-16 DIAGNOSIS — I44.2 COMPLETE HEART BLOCK (HCC): ICD-10-CM

## 2023-11-16 DIAGNOSIS — I44.1 MOBITZ TYPE 1 SECOND DEGREE ATRIOVENTRICULAR BLOCK: Primary | ICD-10-CM

## 2023-12-27 ENCOUNTER — TELEPHONE (OUTPATIENT)
Age: 53
End: 2023-12-27

## 2023-12-27 DIAGNOSIS — R50.9 FEVER: ICD-10-CM

## 2023-12-27 DIAGNOSIS — R50.9 FEVER: Primary | ICD-10-CM

## 2023-12-27 LAB
BASOPHILS # BLD: 0.1 K/UL (ref 0–0.2)
BASOPHILS NFR BLD: 1 % (ref 0–2)
CK SERPL-CCNC: 283 U/L (ref 21–215)
DIFFERENTIAL METHOD BLD: ABNORMAL
EOSINOPHIL # BLD: 0.3 K/UL (ref 0–0.8)
EOSINOPHIL NFR BLD: 3 % (ref 0.5–7.8)
ERYTHROCYTE [DISTWIDTH] IN BLOOD BY AUTOMATED COUNT: 13.6 % (ref 11.9–14.6)
ERYTHROCYTE [SEDIMENTATION RATE] IN BLOOD: 66 MM/HR
HCT VFR BLD AUTO: 38.1 % (ref 41.1–50.3)
HGB BLD-MCNC: 12 G/DL (ref 13.6–17.2)
IMM GRANULOCYTES # BLD AUTO: 0 K/UL (ref 0–0.5)
IMM GRANULOCYTES NFR BLD AUTO: 0 % (ref 0–5)
LYMPHOCYTES # BLD: 2 K/UL (ref 0.5–4.6)
LYMPHOCYTES NFR BLD: 19 % (ref 13–44)
MCH RBC QN AUTO: 27.8 PG (ref 26.1–32.9)
MCHC RBC AUTO-ENTMCNC: 31.5 G/DL (ref 31.4–35)
MCV RBC AUTO: 88.4 FL (ref 82–102)
MONOCYTES # BLD: 1 K/UL (ref 0.1–1.3)
MONOCYTES NFR BLD: 10 % (ref 4–12)
NEUTS SEG # BLD: 7.1 K/UL (ref 1.7–8.2)
NEUTS SEG NFR BLD: 67 % (ref 43–78)
NRBC # BLD: 0 K/UL (ref 0–0.2)
PLATELET # BLD AUTO: 322 K/UL (ref 150–450)
PMV BLD AUTO: 9.4 FL (ref 9.4–12.3)
RBC # BLD AUTO: 4.31 M/UL (ref 4.23–5.6)
WBC # BLD AUTO: 10.4 K/UL (ref 4.3–11.1)

## 2023-12-27 NOTE — TELEPHONE ENCOUNTER
Micra placed 11/1 w/ stable threshold confirmed w/ [employer] company and good placement w/ recent CXR done. Recent stable cbc and cmp. Reporting low grade fevers and back pain seen by pcp and urgent care w/ no current infections noted. Will review w/ Dr. Moses Hogue. HPI:   The patient is a 52-year-old male with history of status post cardiac pacemaker, hypertension, ulcerative colitis (on immunosuppression) who presents today complaining of 3-day history of fever. Patient has had some associated upper back discomfort and shortness of breath. He also reports a oxygen saturation of 88 to 90% when checked at home. Patient has taken 3 home COVID test with the last being yesterday all of which were negative.

## 2023-12-27 NOTE — TELEPHONE ENCOUNTER
Pt had fever and called last week and was recommended to call his primary. Primary found nothing wrong. Still experiencing fever 100-101 F. Especially at night. Takes Tylenol to reduce it but doesn't want to live taking tylenol. Pain in the upper inside back . .. Feels like pain in lungs. Tireness and has been tested flu and covid . Did Electrocardiogram but with [facility] they didn't know how to read it. Thinks it is stemming from the PM which was put in on 11/1/23. Making follow up appt with Dianna.

## 2023-12-27 NOTE — TELEPHONE ENCOUNTER
Reviewed w/ Dr. Donnie Sandoval and cbc, sed rate, ck and blood CX were ordered. CT scan and echo ordered. Patient called and aware of the plan for testing and labs. Verbalized understanding.

## 2023-12-27 NOTE — TELEPHONE ENCOUNTER
Patient called stating that he went to his PCP on December 19, for evaluation of fever 100-101. Everything was okay. Yesterday, patient went to [facility] urgent care for symptoms of fever, pain in upper back. Was tested for COVID and Flu, both were negative. CXR and EKG was done, both were okay. Was told to call cardiology as the fever could be from his pacemaker done in November.

## 2023-12-29 ENCOUNTER — TELEPHONE (OUTPATIENT)
Age: 53
End: 2023-12-29

## 2023-12-29 NOTE — TELEPHONE ENCOUNTER
Patient called stating he would like to discuss the results of latest blood test.    Please call and advise.

## 2023-12-29 NOTE — TELEPHONE ENCOUNTER
Spoke with pt made him aware Dr Regulo Salinas reviewed his bloodwork and stated his labs are stable. No indications showing any signs of infection pertaining to his pacer. Advised pt to follow up with PCP and report to urgent care should symptoms persist or worsen and to keep upcoming scheduled tests. Also made pt aware device team will follow up with him in about one week to check on symptoms.  Pt verbalized understanding

## 2024-01-01 LAB
BACTERIA SPEC CULT: NORMAL
SERVICE CMNT-IMP: NORMAL

## 2024-01-17 ENCOUNTER — TELEPHONE (OUTPATIENT)
Age: 54
End: 2024-01-17

## 2024-01-17 NOTE — TELEPHONE ENCOUNTER
----- Message from Trent Lozoya MD sent at 1/17/2024 12:54 PM EST -----  EF slightly reduced at 45%.   ----- Message -----  From: Adarsh Garcia III, MD  Sent: 1/16/2024  12:18 PM EST  To: Trent Lozoya MD

## 2024-01-19 NOTE — TELEPHONE ENCOUNTER
Patient left VMM understanding results of stable echo. States he will have his CT scan performed in February.

## 2024-02-19 PROCEDURE — 93294 REM INTERROG EVL PM/LDLS PM: CPT | Performed by: INTERNAL MEDICINE

## 2024-02-19 PROCEDURE — 93296 REM INTERROG EVL PM/IDS: CPT | Performed by: INTERNAL MEDICINE

## 2024-05-23 PROCEDURE — 93294 REM INTERROG EVL PM/LDLS PM: CPT | Performed by: INTERNAL MEDICINE

## 2024-05-23 PROCEDURE — 93296 REM INTERROG EVL PM/IDS: CPT | Performed by: INTERNAL MEDICINE

## 2024-09-06 PROCEDURE — 93294 REM INTERROG EVL PM/LDLS PM: CPT | Performed by: INTERNAL MEDICINE

## 2024-09-06 PROCEDURE — 93296 REM INTERROG EVL PM/IDS: CPT | Performed by: INTERNAL MEDICINE

## 2025-07-15 ENCOUNTER — TELEPHONE (OUTPATIENT)
Age: 55
End: 2025-07-15

## 2025-07-15 NOTE — TELEPHONE ENCOUNTER
Cardiac Clearance        Physician or Practice Requesting:[facility]  : Steffi Coley  Contact Phone Number: ?  Fax Number: [phone]  Date of Surgery/Procedure: TBD  Type of Surgery or Procedure: Endoscopic Procedure  Type of Anesthesia: iv sedation  Type of Clearance Requested: Cardiac Clearance and Medication Hold  Medication to Hold:?  Days to Hold: ?

## 2025-07-15 NOTE — TELEPHONE ENCOUNTER
Patient released to prn follow up at last appt., October of 2023. Sent fax to Steffi with [facility] to ask for risk assessment from pcp or have patient reach out to schedule appt.

## 2025-07-22 PROCEDURE — 93296 REM INTERROG EVL PM/IDS: CPT | Performed by: INTERNAL MEDICINE

## 2025-07-22 PROCEDURE — 93294 REM INTERROG EVL PM/LDLS PM: CPT | Performed by: INTERNAL MEDICINE

## (undated) DEVICE — KENDALL RADIOLUCENT FOAM MONITORING ELECTRODE RECTANGULAR SHAPE: Brand: KENDALL

## (undated) DEVICE — Device

## (undated) DEVICE — FORCEPS BX L240CM JAW DIA2.8MM L CAP W/ NDL MIC MESH TOOTH

## (undated) DEVICE — CONNECTOR TBNG OD5-7MM O2 END DISP

## (undated) DEVICE — SYR 3ML LL TIP 1/10ML GRAD --

## (undated) DEVICE — NDL PRT INJ NSAF BLNT 18GX1.5 --

## (undated) DEVICE — 18G NG KIT WITH 96IN PROBE COVER (10 PK): Brand: SITE-RITE

## (undated) DEVICE — GUIDEWIRE VASC L180CM DIA0.035IN L7CM DIA3MM J TIP PTFE S

## (undated) DEVICE — SYR 5ML 1/5 GRAD LL NSAF LF --

## (undated) DEVICE — CONTAINER PREFIL FRMLN 40ML --

## (undated) DEVICE — DILATOR SET: Brand: COOK

## (undated) DEVICE — CANNULA NSL ORAL AD FOR CAPNOFLEX CO2 O2 AIRLFE

## (undated) DEVICE — PINNACLE TIF INTRODUCER SHEATH: Brand: PINNACLE

## (undated) DEVICE — BOWL MED M 16OZ PLAS CAP GRAD

## (undated) DEVICE — SUTURE ETHBND EXCEL SZ 0 L30IN NONABSORBABLE GRN CT1 L36MM X424H

## (undated) DEVICE — PRESSURE MONITORING SET: Brand: TRUWAVE